# Patient Record
Sex: MALE | Race: WHITE | URBAN - METROPOLITAN AREA
[De-identification: names, ages, dates, MRNs, and addresses within clinical notes are randomized per-mention and may not be internally consistent; named-entity substitution may affect disease eponyms.]

---

## 2017-01-04 ENCOUNTER — TELEPHONE (OUTPATIENT)
Dept: CARDIOLOGY CLINIC | Age: 72
End: 2017-01-04

## 2017-01-04 NOTE — TELEPHONE ENCOUNTER
Dr. Wganer Asp office called and wanted to know if patient could hold coumadin prior to surgery (colonscopy) on 2/3/17. Please Advise.

## 2017-01-06 NOTE — TELEPHONE ENCOUNTER
Called Jaylyn the nurse at [facility] DR XIOMARA CHOI and let know that patient can hold coumadin prior to surgery and resume post procedure.

## 2017-01-12 ENCOUNTER — TELEPHONE ANTICOAG (OUTPATIENT)
Dept: CARDIOLOGY CLINIC | Age: 72
End: 2017-01-12

## 2017-01-12 DIAGNOSIS — I48.20 CHRONIC ATRIAL FIBRILLATION (HCC): ICD-10-CM

## 2017-01-12 LAB — INR, EXTERNAL: 3.2

## 2017-01-12 NOTE — PATIENT INSTRUCTIONS
Called and left VM with patient to continue with current dose of coumadin and recheck INR in 1 week on 1/12/17. If any questions to call office.

## 2017-01-26 ENCOUNTER — TELEPHONE ANTICOAG (OUTPATIENT)
Dept: CARDIOLOGY CLINIC | Age: 72
End: 2017-01-26

## 2017-01-26 DIAGNOSIS — I48.20 CHRONIC ATRIAL FIBRILLATION (HCC): ICD-10-CM

## 2017-01-26 LAB — INR, EXTERNAL: 3.1

## 2017-01-26 NOTE — PATIENT INSTRUCTIONS
Called and spoke patient to continue with current treatment, follow GI recommendations for resuming coumadin, ok to resume coumadin and recheck INR 3 days after resumed. He voices understanding and acceptance of this advice and will call back if any further questions or concerns.

## 2017-02-10 ENCOUNTER — TELEPHONE ANTICOAG (OUTPATIENT)
Dept: CARDIOLOGY CLINIC | Age: 72
End: 2017-02-10

## 2017-02-10 DIAGNOSIS — I48.20 CHRONIC ATRIAL FIBRILLATION (HCC): ICD-10-CM

## 2017-02-10 LAB — INR, EXTERNAL: 1.4

## 2017-02-10 NOTE — PATIENT INSTRUCTIONS
Patient called to review INR results. He will take coumadin 10 mg tonight and Saturday night then begin to alternate 7.5mg and 10mg every evening and frepeat INR on 2/14/17. He voices understanding and acceptance of this advice and will call back if any further questions or concerns.

## 2017-02-15 ENCOUNTER — TELEPHONE ANTICOAG (OUTPATIENT)
Dept: CARDIOLOGY CLINIC | Age: 72
End: 2017-02-15

## 2017-02-15 DIAGNOSIS — I48.20 CHRONIC ATRIAL FIBRILLATION (HCC): ICD-10-CM

## 2017-02-15 LAB — INR, EXTERNAL: 2.1

## 2017-02-15 NOTE — PATIENT INSTRUCTIONS
Called and left voicemail with patient to continue with current dose of coumadin and recheck INR in 1 week on 2/21/17. If any questions to call office.

## 2017-02-23 ENCOUNTER — TELEPHONE ANTICOAG (OUTPATIENT)
Dept: CARDIOLOGY CLINIC | Age: 72
End: 2017-02-23

## 2017-02-23 DIAGNOSIS — I48.20 CHRONIC ATRIAL FIBRILLATION (HCC): ICD-10-CM

## 2017-02-23 LAB — INR, EXTERNAL: 2.8

## 2017-03-01 ENCOUNTER — OFFICE VISIT (OUTPATIENT)
Dept: CARDIOLOGY CLINIC | Age: 72
End: 2017-03-01

## 2017-03-01 ENCOUNTER — ANTI-COAG VISIT (OUTPATIENT)
Dept: CARDIOLOGY CLINIC | Age: 72
End: 2017-03-01

## 2017-03-01 VITALS
HEART RATE: 87 BPM | BODY MASS INDEX: 21.54 KG/M2 | WEIGHT: 159 LBS | DIASTOLIC BLOOD PRESSURE: 78 MMHG | SYSTOLIC BLOOD PRESSURE: 108 MMHG | HEIGHT: 72 IN

## 2017-03-01 DIAGNOSIS — I34.0 MODERATE MITRAL REGURGITATION: Primary | ICD-10-CM

## 2017-03-01 DIAGNOSIS — I11.9 BENIGN HYPERTENSIVE HEART DISEASE WITHOUT HEART FAILURE: ICD-10-CM

## 2017-03-01 DIAGNOSIS — E11.9 TYPE 2 DIABETES MELLITUS WITHOUT COMPLICATION, UNSPECIFIED LONG TERM INSULIN USE STATUS: ICD-10-CM

## 2017-03-01 DIAGNOSIS — I48.20 CHRONIC ATRIAL FIBRILLATION (HCC): ICD-10-CM

## 2017-03-01 DIAGNOSIS — I48.92 PAROXYSMAL ATRIAL FLUTTER (HCC): ICD-10-CM

## 2017-03-01 DIAGNOSIS — E78.5 DYSLIPIDEMIA: ICD-10-CM

## 2017-03-01 LAB
INR BLD: 2.2
PT POC: NORMAL SEC
VALID INTERNAL CONTROL?: YES

## 2017-03-01 RX ORDER — IPRATROPIUM BROMIDE 42 UG/1
2 SPRAY, METERED NASAL 4 TIMES DAILY
COMMUNITY
End: 2018-06-21

## 2017-03-01 NOTE — MR AVS SNAPSHOT
Visit Information Date & Time Provider Department Dept. Phone Encounter #  
 3/1/2017 11:30 AM Robert Flores MD [facility] [phone] [id number] Follow-up Instructions Return in about 3 months (around 6/1/2017). Follow-up and Disposition History Your Appointments 5/30/2017 11:30 AM  
PROCEDURE with [place] ECHO [facility] ([facility]) Appt Note: Echo/Phill[address] [facility] [address]  
  
   
 [address] [id number] [address] [address] 6/14/2017 10:30 AM  
Follow Up with Robert Flores MD  
[facility] ([facility]) Appt Note: 3 month follow up post echo  
 [address] [facility] [address]  
  
   
 [address] [facility] [address]  
  
    
  
 3/21/2017  2:15 PM  
Any with Scott Lee MD  
[facility] ([facility]) [address] [place] [address]  
[address] [facility] [address] [facility] [address] [facility] [address] Upcoming Health Maintenance Date Due Hepatitis C Screening 1945 FOOT EXAM Q1 7/1/1957 EYE EXAM RETINAL OR DILATED Q1 7/1/1957 DTaP/Tdap/Td series (1 - Tdap) 7/1/1968 FOBT Q 1 YEAR AGE 50-75 7/1/1997 ZOSTER VACCINE AGE 60> 7/1/2007 GLAUCOMA SCREENING Q2Y 7/1/2012 Pneumococcal 65+ Low/Medium Risk (1 of 2 - PCV13) 7/1/2012 MEDICARE YEARLY EXAM 7/1/2012 HEMOGLOBIN A1C Q6M 5/18/2016 INFLUENZA AGE 9 TO ADULT 8/1/2016 MICROALBUMIN Q1 11/18/2016 LIPID PANEL Q1 11/18/2016 Allergies as of 3/1/2017  Review Complete On: 3/1/2017 By: Robert Flores MD  
  
 Severity Noted Reaction Type Reactions Iodinated Contrast Media - Oral And Iv Dye    Unknown (comments) States no allergy to iodine Other Medication    Unknown (comments) Anesthesia (nausea) Current Immunizations  Never Reviewed No immunizations on file. Not reviewed this visit You Were Diagnosed With   
  
 Codes Comments Moderate mitral regurgitation    -  Primary ICD-10-CM: I34.0 ICD-9-CM: 424.0 Paroxysmal atrial flutter (HCC)     ICD-10-CM: I48.92 
ICD-9-CM: 427.32 Dyslipidemia     ICD-10-CM: E78.5 ICD-9-CM: 272.4 Benign hypertensive heart disease without heart failure     ICD-10-CM: I11.9 ICD-9-CM: 402.10 Type 2 diabetes mellitus without complication, unspecified long term insulin use status (HCC)     ICD-10-CM: E11.9 ICD-9-CM: 250.00 Vitals BP  
  
  
  
  
  
 108/78 Vitals History BMI and BSA Data Body Mass Index Body Surface Area  
 21.56 kg/m 2 1.91 m 2 Preferred Pharmacy Pharmacy Name Phone  
 [address] [place] [address] [phone] Your Updated Medication List  
  
   
This list is accurate as of: 3/1/17  3:02 PM.  Always use your most recent med list.  
  
  
  
  
 cholecalciferol (VITAMIN D3) 5,000 unit Tab tablet Commonly known as:  VITAMIN D3 Take 2,000 Units by mouth. DIGOX 0.125 mg tablet Generic drug:  digoxin TAKE 1 TABLET ONCE DAILY. dutasteride 0.5 mg capsule Commonly known as:  AVODART Take 1 Cap by mouth two (2) days a week. HumaLOG Mix 50-50 100 unit/mL (50-50) Inpn Generic drug:  lispro-protamine & lispro  
by SubCUTAneous route two (2) times a day. 22 units Q AM, 10 units QPM  
  
 ipratropium 0.06 % nasal spray Commonly known as:  ATROVENT  
2 Sprays four (4) times daily. levothyroxine 125 mcg tablet Commonly known as:  SYNTHROID Take 137 mcg by mouth Daily (before breakfast). lisinopril 5 mg tablet Commonly known as:  PRINIVIL, ZESTRIL  
1 tablet by mouth daily, Hold medication if systolic less than 701 or diastolic less than 60  
  
 metoprolol tartrate 50 mg tablet Commonly known as:  LOPRESSOR Take 1 Tab by mouth two (2) times a day. warfarin 5 mg tablet Commonly known as:  COUMADIN Take 1 1/2 tablets every evening or as directed. Follow-up Instructions Return in about 3 months (around 6/1/2017). To-Do List   
 06/01/2017 Cardiac Services:  2D ECHO COMPLETE ADULT (TTE) Patient Instructions Mitral Valve Regurgitation: Care Instructions Your Care Instructions The mitral valve lets blood flow from the upper to lower areas of the heart. Mitral valve regurgitation occurs when the valve cannot close all the way and blood backs up (regurgitates) into the upper area of the heart. This causes the heart to work harder to pump the extra blood. Mild regurgitation causes few problems. Many people have it for many years without having problems. But if the regurgitation is severe, it can weaken the heart and lead to heart failure. The causes of mitral valve regurgitation include a heart attack, heart infection (endocarditis), mitral valve prolapse, cardiomyopathy, calcium buildup in the heart, the weight-loss medicine Fen-Phen, and diseases such as lupus and rheumatoid arthritis. Some people are born with the valve problem. Your doctor may just want to watch your health closely if you have mild mitral valve regurgitation. For more severe disease, you may need medicine or surgery to fix the valve. Follow-up care is a key part of your treatment and safety. Be sure to make and go to all appointments, and call your doctor if you are having problems. It's also a good idea to know your test results and keep a list of the medicines you take. How can you care for yourself at home? · Be safe with medicines. Take your medicines exactly as prescribed. Call your doctor if you think you are having a problem with your medicine. You will get more details on the specific medicines your doctor prescribes.  
· Eat heart-healthy foods such as fruits, vegetables, whole grains, fish, lean meats, and low-fat or nonfat dairy foods. Limit sodium, sugar, and alcohol. · Be active. Ask your doctor what type and level of exercise is safe for you. Walking is a good choice. You also may want to swim, bike, or do other activities. Let your doctor know if your ability to exercise changes. · Do not smoke. If you need help quitting, talk to your doctor about stop-smoking programs and medicines. These can increase your chances of quitting for good. · Stay at a healthy weight. Lose weight if you need to. · Avoid colds and flu. Get a pneumococcal vaccine shot. If you have had one before, ask your doctor if you need another dose. Get a flu vaccine every year. · Take care of your teeth and gums. Get regular dental checkups. Good dental health is important because bacteria can spread from infected teeth and gums to the heart valves. When should you call for help? Call 911 anytime you think you may need emergency care. For example, call if: 
· You have severe trouble breathing. · You cough up pink, foamy mucus. · You have symptoms of a heart attack. These may include: ¨ Chest pain or pressure, or a strange feeling in the chest. 
¨ Sweating. ¨ Shortness of breath. ¨ Nausea or vomiting. ¨ Pain, pressure, or a strange feeling in the back, neck, jaw, or upper belly or in one or both shoulders or arms. ¨ Lightheadedness or sudden weakness. ¨ A fast or irregular heartbeat. After you call 911, the  may tell you to chew 1 adult-strength or 2 to 4 low-dose aspirin. Wait for an ambulance. Do not try to drive yourself. Call your doctor now or seek immediate medical care if: 
· You have new or increased shortness of breath. · You are dizzy or lightheaded, or you feel like you may faint. · You have sudden weight gain, such as 3 pounds or more in 2 to 3 days. · You have increased swelling in your legs, ankles, or feet. · You are suddenly so tired or weak that you cannot do your usual activities. Watch closely for changes in your health, and be sure to contact your doctor if you develop new symptoms. Where can you learn more? Go to http://rayray-bonifacio.info/. Enter G003 in the search box to learn more about \"Mitral Valve Regurgitation: Care Instructions. \" Current as of: January 27, 2016 Content Version: 11.1 © 8914-9535 [employer]. Care instructions adapted under license by [employer] (which disclaims liability or warranty for this information). If you have questions about a medical condition or this instruction, always ask your healthcare professional. [place] [address] any warranty or liability for your use of this information. Patient Instructions History Introducing [facility]! [address] introduces Vertra patient portal. Now you can access parts of your medical record, email your doctor's office, and request medication refills online. 1. In your internet browser, go to [email or web address] 2. Click on the First Time User? Click Here link in the Sign In box. You will see the New Member Sign Up page. 3. Enter your Vertra Access Code exactly as it appears below. You will not need to use this code after youve completed the sign-up process. If you do not sign up before the expiration date, you must request a new code. · Vertra Access Code: [email or web address] Expires: 5/16/2017  1:05 PM 
 
4. Enter the last four digits of your Social Security Number (xxxx) and Date of Birth (mm/dd/yyyy) as indicated and click Submit. You will be taken to the next sign-up page. 5. Create a Vertra ID. This will be your Vertra login ID and cannot be changed, so think of one that is secure and easy to remember. 6. Create a Vertra password. You can change your password at any time. 7. Enter your Password Reset Question and Answer. This can be used at a later time if you forget your password. 8. Enter your e-mail address. You will receive e-mail notification when new information is available in [address] 9. Click Sign Up. You can now view and download portions of your medical record. 10. Click the Download Summary menu link to download a portable copy of your medical information. If you have questions, please visit the Frequently Asked Questions section of the [employer] website. Remember, [employer] is NOT to be used for urgent needs. For medical emergencies, dial 911. Now available from your iPhone and Android! Please provide this summary of care documentation to your next provider. Your primary care clinician is listed as Jamie Hough. If you have any questions after today's visit, please call [phone].

## 2017-03-01 NOTE — PATIENT INSTRUCTIONS
Mitral Valve Regurgitation: Care Instructions  Your Care Instructions    The mitral valve lets blood flow from the upper to lower areas of the heart. Mitral valve regurgitation occurs when the valve cannot close all the way and blood backs up (regurgitates) into the upper area of the heart. This causes the heart to work harder to pump the extra blood. Mild regurgitation causes few problems. Many people have it for many years without having problems. But if the regurgitation is severe, it can weaken the heart and lead to heart failure. The causes of mitral valve regurgitation include a heart attack, heart infection (endocarditis), mitral valve prolapse, cardiomyopathy, calcium buildup in the heart, the weight-loss medicine Fen-Phen, and diseases such as lupus and rheumatoid arthritis. Some people are born with the valve problem. Your doctor may just want to watch your health closely if you have mild mitral valve regurgitation. For more severe disease, you may need medicine or surgery to fix the valve. Follow-up care is a key part of your treatment and safety. Be sure to make and go to all appointments, and call your doctor if you are having problems. It's also a good idea to know your test results and keep a list of the medicines you take. How can you care for yourself at home? · Be safe with medicines. Take your medicines exactly as prescribed. Call your doctor if you think you are having a problem with your medicine. You will get more details on the specific medicines your doctor prescribes. · Eat heart-healthy foods such as fruits, vegetables, whole grains, fish, lean meats, and low-fat or nonfat dairy foods. Limit sodium, sugar, and alcohol. · Be active. Ask your doctor what type and level of exercise is safe for you. Walking is a good choice. You also may want to swim, bike, or do other activities. Let your doctor know if your ability to exercise changes. · Do not smoke.  If you need help quitting, talk to your doctor about stop-smoking programs and medicines. These can increase your chances of quitting for good. · Stay at a healthy weight. Lose weight if you need to. · Avoid colds and flu. Get a pneumococcal vaccine shot. If you have had one before, ask your doctor if you need another dose. Get a flu vaccine every year. · Take care of your teeth and gums. Get regular dental checkups. Good dental health is important because bacteria can spread from infected teeth and gums to the heart valves. When should you call for help? Call 911 anytime you think you may need emergency care. For example, call if:  · You have severe trouble breathing. · You cough up pink, foamy mucus. · You have symptoms of a heart attack. These may include:  ¨ Chest pain or pressure, or a strange feeling in the chest.  ¨ Sweating. ¨ Shortness of breath. ¨ Nausea or vomiting. ¨ Pain, pressure, or a strange feeling in the back, neck, jaw, or upper belly or in one or both shoulders or arms. ¨ Lightheadedness or sudden weakness. ¨ A fast or irregular heartbeat. After you call 911, the  may tell you to chew 1 adult-strength or 2 to 4 low-dose aspirin. Wait for an ambulance. Do not try to drive yourself. Call your doctor now or seek immediate medical care if:  · You have new or increased shortness of breath. · You are dizzy or lightheaded, or you feel like you may faint. · You have sudden weight gain, such as 3 pounds or more in 2 to 3 days. · You have increased swelling in your legs, ankles, or feet. · You are suddenly so tired or weak that you cannot do your usual activities. Watch closely for changes in your health, and be sure to contact your doctor if you develop new symptoms. Where can you learn more? Go to http://rayray-bonifacio.info/. Enter G003 in the search box to learn more about \"Mitral Valve Regurgitation: Care Instructions. \"  Current as of: January 27, 2016  Content Version: 11.1  © 7395-8054 [employer]. Care instructions adapted under license by [employer] (which disclaims liability or warranty for this information). If you have questions about a medical condition or this instruction, always ask your healthcare professional. [place] [address] any warranty or liability for your use of this information.

## 2017-03-01 NOTE — PROGRESS NOTES
1. Have you been to the ER, urgent care clinic since your last visit? Hospitalized since your last visit? No    2. Have you seen or consulted any other health care providers outside of the [address] [place] [address] since your last visit? Include any pap smears or colon screening. Yes Where: Dr [place] Tamez     3. Since your last visit, have you had any of the following symptoms? shortness of breath. 4.  Have you had any blood work, X-rays or cardiac testing? No              5.  Where do you normally have your labs drawn? Dr Bob Reyes     6. Do you need any refills today?    No

## 2017-03-01 NOTE — PROGRESS NOTES
HISTORY OF PRESENT ILLNESS  Josie Alexandra is a 73 y.o. male. Shortness of Breath   The history is provided by the patient. This is a chronic problem. The problem occurs intermittently. The current episode started more than 1 week ago. The problem has not changed since onset. Pertinent negatives include no fever, no ear pain, no neck pain, no cough, no sputum production, no hemoptysis, no wheezing, no PND, no orthopnea, no syncope, no vomiting, no rash, no leg pain, no leg swelling and no claudication. He has had prior hospitalizations. Associated medical issues do not include chronic lung disease, CAD or heart failure. Palpitations    The history is provided by the patient. The current episode started more than 1 week ago. The problem has been rapidly improving. The problem occurs rarely. Pertinent negatives include no diaphoresis, no fever, no claudication, no near-syncope, no orthopnea, no PND, no syncope, no nausea, no vomiting, no leg pain, no dizziness, no weakness, no cough, no hemoptysis and no sputum production. Risk factors include dyslipidemia, hypertension and male gender. His past medical history is significant for hypertension and atrial fibrillation. His past medical history does not include hyperthyroidism. Hypertension   The history is provided by the patient. This is a chronic problem. The current episode started more than 1 week ago. The problem occurs every several days. The problem has not changed since onset. Review of Systems   Constitutional: Negative for chills, diaphoresis, fever and weight loss. HENT: Negative for ear pain and hearing loss. Eyes: Negative for blurred vision. Respiratory: Negative for cough, hemoptysis, sputum production, wheezing and stridor. Cardiovascular: Positive for palpitations. Negative for orthopnea, claudication, leg swelling, syncope, PND and near-syncope. Gastrointestinal: Negative for heartburn, nausea and vomiting.    Musculoskeletal: Negative for myalgias and neck pain. Skin: Negative for rash. Neurological: Negative for dizziness, tingling, tremors, focal weakness, loss of consciousness and weakness. Psychiatric/Behavioral: Negative for depression and suicidal ideas. Family History   Problem Relation Age of Onset    Hypertension Mother     Heart Attack Father     Pacemaker Father        Past Medical History:   Diagnosis Date    AF (atrial fibrillation) ([facility] [address] 9/3/09    Atrial flutter (HCC)     BPH (benign prostatic hypertrophy) 9/3/09    Carotid duplex 09/14/05    No stenosis bilaterally     Diabetes mellitus, type 2 ([facility] [address]     Echocardiogram 10/27/2011    EF 60-65%. Gr 1 DDfx. RVSP 30 mmHg. Mild LAE. Mild MR. Diastolic doming of mitral valve. Mild IVCE.     Elevated PSA     Hypercholesterolemia     dislipidemia    Hypertension     Hypothyroidism     on replacement therapy    Long term (current) use of anticoagulants 3/16/2011    Lower extremity arterial duplex 03/16/09    No evidence of arterial insufficiency bilaterally at rest    Paroxysmal atrial fibrillation (HCC)     Personal history of urinary calculi 9/3/09    Stress thallium 08/07/08    Very small scarring in basal inferior wall w/min ischemia likely artifact; EF 68%; rapid AF during exercise; EKG portion negative which was terminated prematurely because of the persistent rapid AF       Past Surgical History:   Procedure Laterality Date    ABDOMEN SURGERY PROC UNLISTED  1991    Colon surgical repair following puncture during removal of polyps    HX HERNIA REPAIR      1974, left; 1997, right    HX LITHOTRIPSY  10/2004    Removed kidney stones    HX OTHER SURGICAL  7/2015    Scar tissue removed from bowels    HX TONSILLECTOMY      age 12       Social History   Substance Use Topics    Smoking status: Former Smoker     Packs/day: 1.50     Years: 35.00     Quit date: 10/11/1980    Smokeless tobacco: Never Used    Alcohol use No       Allergies Allergen Reactions    Iodinated Contrast Media - Oral And Iv Dye Unknown (comments)     States no allergy to iodine    Other Medication Unknown (comments)     Anesthesia (nausea)       Outpatient Prescriptions Marked as Taking for the 3/1/17 encounter (Office Visit) with Abner León MD   Medication Sig Dispense Refill    ipratropium (ATROVENT) 0.06 % nasal spray 2 Sprays four (4) times daily.  dutasteride (AVODART) 0.5 mg capsule Take 1 Cap by mouth two (2) days a week. 30 Cap 2    DIGOX 125 mcg tablet TAKE 1 TABLET ONCE DAILY. 90 Tab 0    metoprolol (LOPRESSOR) 50 mg tablet Take 1 Tab by mouth two (2) times a day. 180 Tab 3    warfarin (COUMADIN) 5 mg tablet Take 1 1/2 tablets every evening or as directed. 180 Tab 3    lisinopril (PRINIVIL, ZESTRIL) 5 mg tablet 1 tablet by mouth daily, Hold medication if systolic less than 009 or diastolic less than 60 90 Tab 3    levothyroxine (SYNTHROID) 125 mcg tablet Take 137 mcg by mouth Daily (before breakfast).  Cholecalciferol, Vitamin D3, 5,000 unit Tab Take 2,000 Units by mouth.  lispro-protamine & lispro (HUMALOG MIX 50-50) 100 unit/mL (50-50) flexpen by SubCUTAneous route two (2) times a day. 22 units Q AM, 10 units QPM           Visit Vitals    /78    Pulse 87    Ht 6' (1.829 m)    Wt 72.1 kg (159 lb)    BMI 21.56 kg/m2     Physical Exam   Constitutional: He is oriented to person, place, and time. He appears well-developed and well-nourished. No distress. HENT:   Head: Atraumatic. Mouth/Throat: No oropharyngeal exudate. Eyes: Conjunctivae are normal. No scleral icterus. Neck: Neck supple. No JVD present. Cardiovascular: Normal rate and regular rhythm. Exam reveals no gallop. Murmur (3/6 holosystolic murmur best heard at mitral area with no radiation) heard. Pulmonary/Chest: Effort normal and breath sounds normal. No stridor. He has no wheezes. He has no rales. Abdominal: Soft. There is no tenderness.  There is no rebound. Musculoskeletal: Normal range of motion. He exhibits no edema or tenderness. Neurological: He is alert and oriented to person, place, and time. He exhibits normal muscle tone. Skin: Skin is warm. He is not diaphoretic. Psychiatric: He has a normal mood and affect. His behavior is normal.       ASSESSMENT and PLAN    ICD-10-CM ICD-9-CM    1. Moderate mitral regurgitation I34.0 424.0 2D ECHO COMPLETE ADULT (TTE)   2. Paroxysmal atrial flutter (HCC) I48.92 427.32    3. Dyslipidemia E78.5 272.4    4. Hypertension I11.9 402.10    5. Type 2 diabetes mellitus without complication, unspecified long term insulin use status (HCC) E11.9 250.00      Follow-up Disposition:  Return in about 3 months (around 6/1/2017). Continue coumadin for anticoagulation. Patient on lopressor and digoxin for rate control. Has very minimal breakthrough palpitations which lasts for few seconds. Will decrease lopressor to 25mg bid as his BP is low normal and feels weak at times  Will obtain echo to assess VHD and follow up in 3months.

## 2017-03-01 NOTE — PATIENT INSTRUCTIONS
Patient will continue to alternate coumadin 7.5mg and 10mg every evening and repeat INR in two weeks, He voices understanding and acceptance of this advice and will call back if any further questions or concerns.

## 2017-03-16 ENCOUNTER — TELEPHONE ANTICOAG (OUTPATIENT)
Dept: CARDIOLOGY CLINIC | Age: 72
End: 2017-03-16

## 2017-03-16 DIAGNOSIS — I48.20 CHRONIC ATRIAL FIBRILLATION (HCC): ICD-10-CM

## 2017-03-16 LAB — INR, EXTERNAL: 2.2

## 2017-03-16 NOTE — PATIENT INSTRUCTIONS
Spoke with patient to continue current dose of coumadin and recheck INR in 2 weeks on 3/30/17. He voices understanding and acceptance of this advice and will call back if any further questions or concerns.

## 2017-03-23 RX ORDER — METOPROLOL TARTRATE 50 MG/1
TABLET ORAL
Qty: 180 TAB | Refills: 0 | Status: SHIPPED | OUTPATIENT
Start: 2017-03-23 | End: 2017-12-06 | Stop reason: SDUPTHER

## 2017-03-27 DIAGNOSIS — I48.92 ATRIAL FLUTTER, UNSPECIFIED TYPE (HCC): Primary | ICD-10-CM

## 2017-03-27 NOTE — TELEPHONE ENCOUNTER
----- Message from Anuradha Guthrie sent at 3/27/2017  9:46 AM EDT -----  Regarding: REFILL  DIGOX 125 mcg tablet, [facility], 90 DAY SUPPLY REQUESTED BY PATIENT

## 2017-03-28 RX ORDER — DIGOXIN 125 MCG
0.12 TABLET ORAL DAILY
Qty: 90 TAB | Refills: 3 | Status: SHIPPED | OUTPATIENT
Start: 2017-03-28 | End: 2018-03-19 | Stop reason: SDUPTHER

## 2017-03-30 ENCOUNTER — TELEPHONE ANTICOAG (OUTPATIENT)
Dept: CARDIOLOGY CLINIC | Age: 72
End: 2017-03-30

## 2017-03-30 DIAGNOSIS — I48.91 ATRIAL FIBRILLATION, UNSPECIFIED TYPE (HCC): ICD-10-CM

## 2017-03-30 LAB — INR, EXTERNAL: 2.3

## 2017-03-30 NOTE — PATIENT INSTRUCTIONS
Called and left voice mail for patient to continue current dose of coumadin and recheck INR in 2 weeks on 4/13/17. If any questions to call the office.

## 2017-04-14 ENCOUNTER — TELEPHONE ANTICOAG (OUTPATIENT)
Dept: CARDIOLOGY CLINIC | Age: 72
End: 2017-04-14

## 2017-04-14 LAB — INR, EXTERNAL: 2.7

## 2017-05-02 ENCOUNTER — TELEPHONE (OUTPATIENT)
Dept: CARDIOLOGY CLINIC | Age: 72
End: 2017-05-02

## 2017-05-02 NOTE — TELEPHONE ENCOUNTER
Patient had home INR testing meter. Most contracts state that you have to test weekly. Some are for every two weeks, Dr. Nya Botello wrote order for every two weeks and company has accepted this order. Patient now calling and stated that Dr. Nya Botello has informed him that he can test every three weeks. With this new order, I wonder if the company and insurance will feel that the patient does not need home monitoring. Please advise.

## 2017-05-02 NOTE — TELEPHONE ENCOUNTER
----- Message from Fabiola Pereira LPN sent at 7/5/8575 10:57 AM EDT -----  Regarding: FW: NEED NEW INR ORDER   Contact: [phone]      ----- Message -----     From: Pro Ellitot     Sent: 5/1/2017   9:45 AM       To: Fabiola Pereira LPN  Subject: NEED NEW INR ORDER                               PLEASE RETURN PATIENT CALL CONCERNING INR ORDER; NEED NEW ORDER THAT STATES EVERY 3 WEEK INSTEAD OF THE 2WEEK.

## 2017-05-02 NOTE — TELEPHONE ENCOUNTER
Patient called and message left with son regarding testing every 2 weeks, for INR home meter. Return call requested for any questions or concerns.

## 2017-05-03 ENCOUNTER — TELEPHONE ANTICOAG (OUTPATIENT)
Dept: CARDIOLOGY CLINIC | Age: 72
End: 2017-05-03

## 2017-05-03 DIAGNOSIS — I48.20 CHRONIC ATRIAL FIBRILLATION (HCC): ICD-10-CM

## 2017-05-03 LAB — INR, EXTERNAL: 2.6

## 2017-05-03 NOTE — PATIENT INSTRUCTIONS
Patient called to review INR results. He will continue alternating coumadin 7.5mg and 10mg and  Repeat INR in 2 weeks. He voices understanding and acceptance of this advice and will call back if any further questions or concerns.

## 2017-05-16 ENCOUNTER — TELEPHONE ANTICOAG (OUTPATIENT)
Dept: CARDIOLOGY CLINIC | Age: 72
End: 2017-05-16

## 2017-05-16 DIAGNOSIS — I48.91 ATRIAL FIBRILLATION, UNSPECIFIED TYPE (HCC): ICD-10-CM

## 2017-05-16 LAB — INR, EXTERNAL: 2.2

## 2017-05-16 NOTE — PATIENT INSTRUCTIONS
Called patient instructed patient to continue current dose of coumadin. Recheck INR in 2 weeks on 5/30/17. He voices understanding and acceptance of this advice and will call back if any further questions or concerns.

## 2017-05-30 ENCOUNTER — TELEPHONE ANTICOAG (OUTPATIENT)
Dept: CARDIOLOGY CLINIC | Age: 72
End: 2017-05-30

## 2017-05-30 ENCOUNTER — CLINICAL SUPPORT (OUTPATIENT)
Dept: CARDIOLOGY CLINIC | Age: 72
End: 2017-05-30

## 2017-05-30 DIAGNOSIS — I34.0 MODERATE MITRAL REGURGITATION: ICD-10-CM

## 2017-05-30 DIAGNOSIS — I48.20 CHRONIC ATRIAL FIBRILLATION (HCC): ICD-10-CM

## 2017-05-30 LAB — INR, EXTERNAL: 2.3

## 2017-05-30 NOTE — PATIENT INSTRUCTIONS
Called patient instructed patient to continue current dose of coumadin. Recheck INR in 2 weeks on June 13,2017. He voices understanding and acceptance of this advice and will call back if any further questions or concerns.

## 2017-06-13 ENCOUNTER — TELEPHONE ANTICOAG (OUTPATIENT)
Dept: CARDIOLOGY CLINIC | Age: 72
End: 2017-06-13

## 2017-06-13 DIAGNOSIS — I48.91 ATRIAL FIBRILLATION, UNSPECIFIED TYPE (HCC): ICD-10-CM

## 2017-06-13 LAB — INR, EXTERNAL: 2.2

## 2017-06-13 NOTE — PATIENT INSTRUCTIONS
Called patient instructed patient to continue current dose of coumadin. Recheck INR in 2 weeks on 6/27/17. He voices understanding and acceptance of this advice and will call back if any further questions or concerns.

## 2017-06-14 ENCOUNTER — OFFICE VISIT (OUTPATIENT)
Dept: CARDIOLOGY CLINIC | Age: 72
End: 2017-06-14

## 2017-06-14 VITALS
WEIGHT: 159 LBS | DIASTOLIC BLOOD PRESSURE: 71 MMHG | HEART RATE: 90 BPM | HEIGHT: 72 IN | SYSTOLIC BLOOD PRESSURE: 101 MMHG | BODY MASS INDEX: 21.54 KG/M2

## 2017-06-14 DIAGNOSIS — E11.9 TYPE 2 DIABETES MELLITUS WITHOUT COMPLICATION, UNSPECIFIED LONG TERM INSULIN USE STATUS: ICD-10-CM

## 2017-06-14 DIAGNOSIS — I11.9 BENIGN HYPERTENSIVE HEART DISEASE WITHOUT HEART FAILURE: ICD-10-CM

## 2017-06-14 DIAGNOSIS — I34.0 MODERATE MITRAL REGURGITATION: ICD-10-CM

## 2017-06-14 DIAGNOSIS — I48.92 PAROXYSMAL ATRIAL FLUTTER (HCC): Primary | ICD-10-CM

## 2017-06-14 DIAGNOSIS — E78.5 DYSLIPIDEMIA: ICD-10-CM

## 2017-06-14 RX ORDER — WARFARIN 10 MG/1
10 TABLET ORAL DAILY
COMMUNITY
End: 2017-12-28 | Stop reason: SDUPTHER

## 2017-06-14 NOTE — PROGRESS NOTES
1. Have you been to the ER, urgent care clinic since your last visit? Hospitalized since your last visit? No    2. Have you seen or consulted any other health care providers outside of the [address] [place] [address] since your last visit? Include any pap smears or colon screening. No     3. Since your last visit, have you had any of the following symptoms? .          4. Have you had any blood work, X-rays or cardiac testing? No              5.  Where do you normally have your labs drawn? [facility]    6. Do you need any refills today?    No

## 2017-06-14 NOTE — PATIENT INSTRUCTIONS

## 2017-06-14 NOTE — MR AVS SNAPSHOT
Visit Information Date & Time Provider Department Dept. Phone Encounter #  
 6/14/2017 10:30 AM Lisa Cruz MD [facility] [id number] Follow-up Instructions Return in about 6 months (around 12/14/2017). Follow-up and Disposition History Your Appointments 3/27/2018 10:30 AM  
Nurse Visit with [facility] ([facility]) Appt Note: PSA  
 [address] 
[address] [facility] [address]  
[address] [facility] [address] [facility] [address]  
  
    
  
 4/3/2018  1:00 PM  
Any with Mireille Lew MD  
[facility] ([facility]) Appt Note: 1 yr fu  
 [address] 
[address] [facility] [address]  
[address] [facility] [address] [facility] [address] [facility] [address] Upcoming Health Maintenance Date Due Hepatitis C Screening 1945 FOOT EXAM Q1 7/1/1957 EYE EXAM RETINAL OR DILATED Q1 7/1/1957 DTaP/Tdap/Td series (1 - Tdap) 7/1/1968 FOBT Q 1 YEAR AGE 50-75 7/1/1997 ZOSTER VACCINE AGE 60> 7/1/2007 GLAUCOMA SCREENING Q2Y 7/1/2012 Pneumococcal 65+ Low/Medium Risk (1 of 2 - PCV13) 7/1/2012 MEDICARE YEARLY EXAM 7/1/2012 HEMOGLOBIN A1C Q6M 5/18/2016 MICROALBUMIN Q1 11/18/2016 LIPID PANEL Q1 11/18/2016 INFLUENZA AGE 9 TO ADULT 8/1/2017 Allergies as of 6/14/2017  Review Complete On: 6/14/2017 By: Lisa Cruz MD  
  
 Severity Noted Reaction Type Reactions Iodinated Contrast Media - Oral And Iv Dye    Unknown (comments) States no allergy to iodine Other Medication    Unknown (comments) Anesthesia (nausea) Current Immunizations  Never Reviewed No immunizations on file. Not reviewed this visit You Were Diagnosed With   
  
 Codes Comments  Paroxysmal atrial flutter (HCC)    -  Primary ICD-10-CM: I48.92 
ICD-9-CM: 427.32   
 Benign hypertensive heart disease without heart failure     ICD-10-CM: I11.9 ICD-9-CM: 402.10 Dyslipidemia     ICD-10-CM: E78.5 ICD-9-CM: 272.4 Moderate mitral regurgitation     ICD-10-CM: I34.0 ICD-9-CM: 424.0 Type 2 diabetes mellitus without complication, unspecified long term insulin use status     ICD-10-CM: E11.9 ICD-9-CM: 250.00 Vitals BP Pulse Height(growth percentile) Weight(growth percentile) BMI Smoking Status 101/71 90 6' (1.829 m) 159 lb (72.1 kg) 21.56 kg/m2 Former Smoker Vitals History BMI and BSA Data Body Mass Index Body Surface Area  
 21.56 kg/m 2 1.91 m 2 Preferred Pharmacy Pharmacy Name Phone  
 [address] [place] [address] [phone] Your Updated Medication List  
  
   
This list is accurate as of: 6/14/17 10:51 AM.  Always use your most recent med list.  
  
  
  
  
 cholecalciferol (VITAMIN D3) 5,000 unit Tab tablet Commonly known as:  VITAMIN D3 Take 2,000 Units by mouth. digoxin 0.125 mg tablet Commonly known as:  [address] [address] [facility] Take 1 Tab by mouth daily. dutasteride 0.5 mg capsule Commonly known as:  AVODART Take 1 Cap by mouth two (2) days a week. HumaLOG Mix 50-50 100 unit/mL (50-50) Inpn Generic drug:  lispro-protamine & lispro  
by SubCUTAneous route two (2) times a day. 22 units Q AM, 10 units QPM  
  
 ipratropium 0.06 % nasal spray Commonly known as:  ATROVENT  
2 Sprays four (4) times daily. levothyroxine 125 mcg tablet Commonly known as:  SYNTHROID Take 125 mcg by mouth Daily (before breakfast). lisinopril 5 mg tablet Commonly known as:  PRINIVIL, ZESTRIL  
1 tablet by mouth daily, Hold medication if systolic less than 187 or diastolic less than 60  
  
 metoprolol tartrate 50 mg tablet Commonly known as:  LOPRESSOR  
TAKE (1) TABLET TWICE A DAY  
  
 warfarin 10 mg tablet Commonly known as:  COUMADIN  
 Take 10 mg by mouth daily. Take 1 tablet every other day then 7.5 mg We Performed the Following AMB POC EKG ROUTINE W/ 12 LEADS, INTER & REP [22397 CPT(R)] Follow-up Instructions Return in about 6 months (around 12/14/2017). Patient Instructions High Blood Pressure: Care Instructions Your Care Instructions If your blood pressure is usually above 140/90, you have high blood pressure, or hypertension. That means the top number is 140 or higher or the bottom number is 90 or higher, or both. Despite what a lot of people think, high blood pressure usually doesn't cause headaches or make you feel dizzy or lightheaded. It usually has no symptoms. But it does increase your risk for heart attack, stroke, and kidney or eye damage. The higher your blood pressure, the more your risk increases. Your doctor will give you a goal for your blood pressure. Your goal will be based on your health and your age. An example of a goal is to keep your blood pressure below 140/90. Lifestyle changes, such as eating healthy and being active, are always important to help lower blood pressure. You might also take medicine to reach your blood pressure goal. 
Follow-up care is a key part of your treatment and safety. Be sure to make and go to all appointments, and call your doctor if you are having problems. It's also a good idea to know your test results and keep a list of the medicines you take. How can you care for yourself at home? Medical treatment · If you stop taking your medicine, your blood pressure will go back up. You may take one or more types of medicine to lower your blood pressure. Be safe with medicines. Take your medicine exactly as prescribed. Call your doctor if you think you are having a problem with your medicine. · Talk to your doctor before you start taking aspirin every day. Aspirin can help certain people lower their risk of a heart attack or stroke.  But taking aspirin isn't right for everyone, because it can cause serious bleeding. · See your doctor regularly. You may need to see the doctor more often at first or until your blood pressure comes down. · If you are taking blood pressure medicine, talk to your doctor before you take decongestants or anti-inflammatory medicine, such as ibuprofen. Some of these medicines can raise blood pressure. · Learn how to check your blood pressure at home. Lifestyle changes · Stay at a healthy weight. This is especially important if you put on weight around the waist. Losing even 10 pounds can help you lower your blood pressure. · If your doctor recommends it, get more exercise. Walking is a good choice. Bit by bit, increase the amount you walk every day. Try for at least 30 minutes on most days of the week. You also may want to swim, bike, or do other activities. · Avoid or limit alcohol. Talk to your doctor about whether you can drink any alcohol. · Try to limit how much sodium you eat to less than 2,300 milligrams (mg) a day. Your doctor may ask you to try to eat less than 1,500 mg a day. · Eat plenty of fruits (such as bananas and oranges), vegetables, legumes, whole grains, and low-fat dairy products. · Lower the amount of saturated fat in your diet. Saturated fat is found in animal products such as milk, cheese, and meat. Limiting these foods may help you lose weight and also lower your risk for heart disease. · Do not smoke. Smoking increases your risk for heart attack and stroke. If you need help quitting, talk to your doctor about stop-smoking programs and medicines. These can increase your chances of quitting for good. When should you call for help? Call 911 anytime you think you may need emergency care. This may mean having symptoms that suggest that your blood pressure is causing a serious heart or blood vessel problem. Your blood pressure may be over 180/110. For example, call 911 if: · You have symptoms of a heart attack. These may include: ¨ Chest pain or pressure, or a strange feeling in the chest. 
¨ Sweating. ¨ Shortness of breath. ¨ Nausea or vomiting. ¨ Pain, pressure, or a strange feeling in the back, neck, jaw, or upper belly or in one or both shoulders or arms. ¨ Lightheadedness or sudden weakness. ¨ A fast or irregular heartbeat. · You have symptoms of a stroke. These may include: 
¨ Sudden numbness, tingling, weakness, or loss of movement in your face, arm, or leg, especially on only one side of your body. ¨ Sudden vision changes. ¨ Sudden trouble speaking. ¨ Sudden confusion or trouble understanding simple statements. ¨ Sudden problems with walking or balance. ¨ A sudden, severe headache that is different from past headaches. · You have severe back or belly pain. Do not wait until your blood pressure comes down on its own. Get help right away. Call your doctor now or seek immediate care if: 
· Your blood pressure is much higher than normal (such as 180/110 or higher), but you don't have symptoms. · You think high blood pressure is causing symptoms, such as: ¨ Severe headache. ¨ Blurry vision. Watch closely for changes in your health, and be sure to contact your doctor if: 
· Your blood pressure measures 140/90 or higher at least 2 times. That means the top number is 140 or higher or the bottom number is 90 or higher, or both. · You think you may be having side effects from your blood pressure medicine. · Your blood pressure is usually normal, but it goes above normal at least 2 times. Where can you learn more? Go to http://rayray-bonifacio.info/. Enter V520 in the search box to learn more about \"High Blood Pressure: Care Instructions. \" Current as of: August 8, 2016 Content Version: 11.2 © 4739-8602 [employer].  Care instructions adapted under license by [employer] (which disclaims liability or warranty for this information). If you have questions about a medical condition or this instruction, always ask your healthcare professional. [address] any warranty or liability for your use of this information. Patient Instructions History Introducing [facility]! Rosaura Big Indian introduces [employer] patient portal. Now you can access parts of your medical record, email your doctor's office, and request medication refills online. 1. In your internet browser, go to [email or web address] 2. Click on the First Time User? Click Here link in the Sign In box. You will see the New Member Sign Up page. 3. Enter your [employer] Access Code exactly as it appears below. You will not need to use this code after youve completed the sign-up process. If you do not sign up before the expiration date, you must request a new code. · [employer] Access Code: [email or web address] Expires: 8/14/2017  2:44 PM 
 
4. Enter the last four digits of your Social Security Number (xxxx) and Date of Birth (mm/dd/yyyy) as indicated and click Submit. You will be taken to the next sign-up page. 5. Create a [employer] ID. This will be your [employer] login ID and cannot be changed, so think of one that is secure and easy to remember. 6. Create a [employer] password. You can change your password at any time. 7. Enter your Password Reset Question and Answer. This can be used at a later time if you forget your password. 8. Enter your e-mail address. You will receive e-mail notification when new information is available in [address] 9. Click Sign Up. You can now view and download portions of your medical record. 10. Click the Download Summary menu link to download a portable copy of your medical information. If you have questions, please visit the Frequently Asked Questions section of the [employer] website. Remember, [employer] is NOT to be used for urgent needs. For medical emergencies, dial 911. Now available from your iPhone and Android! Please provide this summary of care documentation to your next provider. Your primary care clinician is listed as Selvin Mclaughlin. If you have any questions after today's visit, please call [phone].

## 2017-06-14 NOTE — PROGRESS NOTES
HISTORY OF PRESENT ILLNESS  Lexi Singer is a 73 y.o. male. Palpitations    The history is provided by the patient. The current episode started more than 1 week ago. The problem has been rapidly improving. The problem occurs rarely. Associated symptoms include shortness of breath. Pertinent negatives include no diaphoresis, no fever, no claudication, no near-syncope, no orthopnea, no PND, no syncope, no nausea, no vomiting, no leg pain, no dizziness, no weakness, no cough, no hemoptysis and no sputum production. Risk factors include dyslipidemia, hypertension and male gender. His past medical history is significant for hypertension and atrial fibrillation. His past medical history does not include hyperthyroidism. Shortness of Breath   The history is provided by the patient. This is a chronic problem. The problem occurs intermittently. The current episode started more than 1 week ago. The problem has not changed since onset. Pertinent negatives include no fever, no ear pain, no neck pain, no cough, no sputum production, no hemoptysis, no wheezing, no PND, no orthopnea, no syncope, no vomiting, no rash, no leg pain, no leg swelling and no claudication. He has had prior hospitalizations. Associated medical issues do not include chronic lung disease, CAD or heart failure. Hypertension   The history is provided by the patient. This is a chronic problem. The current episode started more than 1 week ago. The problem occurs every several days. The problem has not changed since onset. Associated symptoms include shortness of breath. Review of Systems   Constitutional: Negative for chills, diaphoresis, fever and weight loss. HENT: Negative for ear pain and hearing loss. Eyes: Negative for blurred vision. Respiratory: Positive for shortness of breath. Negative for cough, hemoptysis, sputum production, wheezing and stridor. Cardiovascular: Positive for palpitations.  Negative for orthopnea, claudication, leg swelling, syncope, PND and near-syncope. Gastrointestinal: Negative for heartburn, nausea and vomiting. Musculoskeletal: Negative for myalgias and neck pain. Skin: Negative for rash. Neurological: Negative for dizziness, tingling, tremors, focal weakness, loss of consciousness and weakness. Psychiatric/Behavioral: Negative for depression and suicidal ideas. Family History   Problem Relation Age of Onset    Hypertension Mother     Heart Attack Father     Pacemaker Father        Past Medical History:   Diagnosis Date    AF (atrial fibrillation) ([facility] [address] 9/3/09    Atrial flutter (HCC)     BPH (benign prostatic hypertrophy) 9/3/09    Carotid duplex 09/14/05    No stenosis bilaterally     Diabetes mellitus, type 2 ([facility] [address]     Echocardiogram 10/27/2011    EF 60-65%. Gr 1 DDfx. RVSP 30 mmHg. Mild LAE. Mild MR. Diastolic doming of mitral valve. Mild IVCE.     Elevated PSA     Hypercholesterolemia     dislipidemia    Hypertension     Hypothyroidism     on replacement therapy    Long term (current) use of anticoagulants 3/16/2011    Lower extremity arterial duplex 03/16/09    No evidence of arterial insufficiency bilaterally at rest    Paroxysmal atrial fibrillation (HCC)     Personal history of urinary calculi 9/3/09    Stress thallium 08/07/08    Very small scarring in basal inferior wall w/min ischemia likely artifact; EF 68%; rapid AF during exercise; EKG portion negative which was terminated prematurely because of the persistent rapid AF       Past Surgical History:   Procedure Laterality Date    ABDOMEN SURGERY PROC UNLISTED  1991    Colon surgical repair following puncture during removal of polyps    HX HERNIA REPAIR      1974, left; 1997, right    HX LITHOTRIPSY  10/2004    Removed kidney stones    HX OTHER SURGICAL  7/2015    Scar tissue removed from bowels    HX TONSILLECTOMY      age 12       Social History   Substance Use Topics    Smoking status: Former Smoker Packs/day: 1.50     Years: 35.00     Quit date: 10/11/1980    Smokeless tobacco: Never Used    Alcohol use No       Allergies   Allergen Reactions    Iodinated Contrast Media - Oral And Iv Dye Unknown (comments)     States no allergy to iodine    Other Medication Unknown (comments)     Anesthesia (nausea)       Outpatient Prescriptions Marked as Taking for the 6/14/17 encounter (Office Visit) with Libby Norris MD   Medication Sig Dispense Refill    warfarin (COUMADIN) 10 mg tablet Take 10 mg by mouth daily. Take 1 tablet every other day then 7.5 mg      dutasteride (AVODART) 0.5 mg capsule Take 1 Cap by mouth two (2) days a week. 30 Cap 3    digoxin (DIGOX) 0.125 mg tablet Take 1 Tab by mouth daily. 90 Tab 3    metoprolol tartrate (LOPRESSOR) 50 mg tablet TAKE (1) TABLET TWICE A DAY (Patient taking differently: TAKE (1) TABLET once A DAY) 180 Tab 0    ipratropium (ATROVENT) 0.06 % nasal spray 2 Sprays four (4) times daily.  levothyroxine (SYNTHROID) 125 mcg tablet Take 125 mcg by mouth Daily (before breakfast).  Cholecalciferol, Vitamin D3, 5,000 unit Tab Take 2,000 Units by mouth.  lispro-protamine & lispro (HUMALOG MIX 50-50) 100 unit/mL (50-50) flexpen by SubCUTAneous route two (2) times a day. 22 units Q AM, 10 units QPM           Visit Vitals    /71    Pulse 90    Ht 6' (1.829 m)    Wt 72.1 kg (159 lb)    BMI 21.56 kg/m2     Physical Exam   Constitutional: He is oriented to person, place, and time. He appears well-developed and well-nourished. No distress. HENT:   Head: Atraumatic. Mouth/Throat: No oropharyngeal exudate. Eyes: Conjunctivae are normal. No scleral icterus. Neck: Neck supple. No JVD present. Cardiovascular: Normal rate and regular rhythm. Exam reveals no gallop. Murmur (3/6 holosystolic murmur best heard at mitral area with no radiation) heard. Pulmonary/Chest: Effort normal and breath sounds normal. No stridor. He has no wheezes.  He has no rales. Abdominal: Soft. There is no tenderness. There is no rebound. Musculoskeletal: Normal range of motion. He exhibits no edema or tenderness. Neurological: He is alert and oriented to person, place, and time. He exhibits normal muscle tone. Skin: Skin is warm. He is not diaphoretic. Psychiatric: He has a normal mood and affect. His behavior is normal.     ekg atrial fibrillaton with controlled ventricular response. Echo 05/2017:  SUMMARY:  Left ventricle: Systolic function was normal. Ejection fraction was  estimated in the range of 55 % to 60 %. There were no regional wall motion  abnormalities. Doppler parameters were consistent with restrictive  physiology, indicative of decreased left ventricular diastolic compliance  and/or increased left atrial pressure. Left atrium: The atrium was severely dilated. LA volume index was 61.72  ml/mï¾². Right atrium: The atrium was dilated. Mitral valve: There was mild annular calcification. There was mild to  moderate regurgitation. Tricuspid valve: There was mild to moderate regurgitation. Pulmonary  artery systolic pressure: 45 mmHg. There was mild pulmonary hypertension. Inferior vena cava, hepatic veins: The respirophasic change in diameter  was less than 50%. ASSESSMENT and PLAN    ICD-10-CM ICD-9-CM    1. Persistent atrial fibrillation (HCC) I48.92 427.32 AMB POC EKG ROUTINE W/ 12 LEADS, INTER & REP   2. Hypertension I11.9 402.10    3. Dyslipidemia E78.5 272.4    4. Moderate mitral regurgitation I34.0 424.0    5. Type 2 diabetes mellitus without complication, unspecified long term insulin use status E11.9 250.00      Follow-up Disposition:  Return in about 6 months (around 12/14/2017). Patient on lopressor and digoxin for rate control. Continue coumadin for anticoagulation. Patient prefers rate control. Rhythm control discussed with patient.   Follow up in 6months

## 2017-06-27 ENCOUNTER — TELEPHONE ANTICOAG (OUTPATIENT)
Dept: CARDIOLOGY CLINIC | Age: 72
End: 2017-06-27

## 2017-06-27 DIAGNOSIS — I48.20 CHRONIC ATRIAL FIBRILLATION (HCC): ICD-10-CM

## 2017-06-27 LAB — INR, EXTERNAL: 2.7

## 2017-06-27 NOTE — PATIENT INSTRUCTIONS
Called patient instructed patient to continue current dose of coumadin. Recheck INR in 2 weeks on 7/11/2017. He voices understanding and acceptance of this advice and will call back if any further questions or concerns.

## 2017-07-11 ENCOUNTER — TELEPHONE ANTICOAG (OUTPATIENT)
Dept: CARDIOLOGY CLINIC | Age: 72
End: 2017-07-11

## 2017-07-11 DIAGNOSIS — I48.91 ATRIAL FIBRILLATION, UNSPECIFIED TYPE (HCC): ICD-10-CM

## 2017-07-11 LAB — INR, EXTERNAL: 2.1

## 2017-07-11 NOTE — PATIENT INSTRUCTIONS
Called patient instructed patient to continue current dose of coumadin. Recheck INR in 2 weeks on 7/25/17. He voices understanding and acceptance of this advice and will call back if any further questions or concerns.

## 2017-07-25 ENCOUNTER — TELEPHONE ANTICOAG (OUTPATIENT)
Dept: CARDIOLOGY CLINIC | Age: 72
End: 2017-07-25

## 2017-07-25 DIAGNOSIS — I48.91 ATRIAL FIBRILLATION, UNSPECIFIED TYPE (HCC): ICD-10-CM

## 2017-07-25 LAB — INR, EXTERNAL: 2.5

## 2017-07-25 NOTE — PATIENT INSTRUCTIONS
Patient will continue to alternate coumadin 10mg and 7.5mg every evening and repeat INR in 2 weeks, 8/8/17. He voices understanding and acceptance of this advice and will call back if any further questions or concerns.

## 2017-08-08 ENCOUNTER — TELEPHONE ANTICOAG (OUTPATIENT)
Dept: CARDIOLOGY CLINIC | Age: 72
End: 2017-08-08

## 2017-08-08 DIAGNOSIS — I48.91 ATRIAL FIBRILLATION, UNSPECIFIED TYPE (HCC): ICD-10-CM

## 2017-08-08 LAB — INR, EXTERNAL: 2.4

## 2017-08-22 ENCOUNTER — TELEPHONE ANTICOAG (OUTPATIENT)
Dept: CARDIOLOGY CLINIC | Age: 72
End: 2017-08-22

## 2017-08-22 DIAGNOSIS — I48.91 ATRIAL FIBRILLATION, UNSPECIFIED TYPE (HCC): ICD-10-CM

## 2017-08-22 LAB — INR, EXTERNAL: 2.3

## 2017-09-06 ENCOUNTER — TELEPHONE ANTICOAG (OUTPATIENT)
Dept: CARDIOLOGY CLINIC | Age: 72
End: 2017-09-06

## 2017-09-06 DIAGNOSIS — I48.91 ATRIAL FIBRILLATION, UNSPECIFIED TYPE (HCC): ICD-10-CM

## 2017-09-06 LAB — INR, EXTERNAL: 2.5

## 2017-09-19 ENCOUNTER — TELEPHONE ANTICOAG (OUTPATIENT)
Dept: CARDIOLOGY CLINIC | Age: 72
End: 2017-09-19

## 2017-09-19 DIAGNOSIS — I48.91 ATRIAL FIBRILLATION, UNSPECIFIED TYPE (HCC): ICD-10-CM

## 2017-09-19 LAB — INR, EXTERNAL: 2.5

## 2017-09-19 NOTE — PATIENT INSTRUCTIONS
Patient called and message left to continue alternating coumadin 10mg and 7.5mg and repeat INR in 2 weeks.

## 2017-10-03 ENCOUNTER — TELEPHONE ANTICOAG (OUTPATIENT)
Dept: CARDIOLOGY CLINIC | Age: 72
End: 2017-10-03

## 2017-10-03 DIAGNOSIS — I48.91 ATRIAL FIBRILLATION, UNSPECIFIED TYPE (HCC): ICD-10-CM

## 2017-10-03 LAB — INR, EXTERNAL: 2.2

## 2017-10-03 NOTE — PATIENT INSTRUCTIONS
Patient called to review INR, he will continue alternating coumadin 10mg and 7.5mg and repeat INR in 2 weeks. He voices understanding and acceptance of this advice and will call back if any further questions or concerns.

## 2017-10-17 ENCOUNTER — TELEPHONE ANTICOAG (OUTPATIENT)
Dept: CARDIOLOGY CLINIC | Age: 72
End: 2017-10-17

## 2017-10-17 DIAGNOSIS — I48.91 ATRIAL FIBRILLATION, UNSPECIFIED TYPE (HCC): ICD-10-CM

## 2017-10-17 LAB — INR, EXTERNAL: 2.1

## 2017-10-17 NOTE — PATIENT INSTRUCTIONS
Patient will continue alternating coumadin 10mg and 7.5mg and repeat INR in 2 weeks. Spoke with patient's wife. She voices understanding and acceptance of this advice and will call back if any further questions or concerns.

## 2017-11-01 ENCOUNTER — TELEPHONE ANTICOAG (OUTPATIENT)
Dept: CARDIOLOGY CLINIC | Age: 72
End: 2017-11-01

## 2017-11-01 DIAGNOSIS — I48.91 ATRIAL FIBRILLATION, UNSPECIFIED TYPE (HCC): ICD-10-CM

## 2017-11-01 LAB — INR, EXTERNAL: 2.8

## 2017-11-01 NOTE — PATIENT INSTRUCTIONS
Patient will continue alternating coumadin 7.5mg and 10mg and repeat INR in 2 weeks. He voices understanding and acceptance of this advice and will call back if any further questions or concerns.

## 2017-11-14 ENCOUNTER — TELEPHONE ANTICOAG (OUTPATIENT)
Dept: CARDIOLOGY CLINIC | Age: 72
End: 2017-11-14

## 2017-11-14 DIAGNOSIS — I48.91 ATRIAL FIBRILLATION, UNSPECIFIED TYPE (HCC): ICD-10-CM

## 2017-11-14 LAB — INR, EXTERNAL: 2.5

## 2017-11-14 NOTE — PATIENT INSTRUCTIONS
Patient called to discuss results of INR. He will continue alternating coumadin 10mg and 7.5mg and repeat INR in 2 weeks. He voices understanding and acceptance of this advice and will call back if any further questions or concerns.

## 2017-11-28 ENCOUNTER — TELEPHONE ANTICOAG (OUTPATIENT)
Dept: CARDIOLOGY CLINIC | Age: 72
End: 2017-11-28

## 2017-11-28 DIAGNOSIS — I48.91 ATRIAL FIBRILLATION, UNSPECIFIED TYPE (HCC): ICD-10-CM

## 2017-11-28 LAB — INR, EXTERNAL: 2.4

## 2017-11-28 NOTE — PATIENT INSTRUCTIONS
Patient called and message left for patient to continue alternating coumadin 7.5mg and 10mg and repeat INR in 2 weeks. Return call requested for any questions or concerns.

## 2017-12-06 ENCOUNTER — OFFICE VISIT (OUTPATIENT)
Dept: CARDIOLOGY CLINIC | Age: 72
End: 2017-12-06

## 2017-12-06 VITALS
HEART RATE: 89 BPM | SYSTOLIC BLOOD PRESSURE: 118 MMHG | BODY MASS INDEX: 21.26 KG/M2 | DIASTOLIC BLOOD PRESSURE: 68 MMHG | WEIGHT: 157 LBS | HEIGHT: 72 IN

## 2017-12-06 DIAGNOSIS — I34.0 MODERATE MITRAL REGURGITATION: ICD-10-CM

## 2017-12-06 DIAGNOSIS — I48.19 PERSISTENT ATRIAL FIBRILLATION (HCC): Primary | ICD-10-CM

## 2017-12-06 DIAGNOSIS — I11.9 BENIGN HYPERTENSIVE HEART DISEASE WITHOUT HEART FAILURE: ICD-10-CM

## 2017-12-06 DIAGNOSIS — E03.4 HYPOTHYROIDISM DUE TO ACQUIRED ATROPHY OF THYROID: ICD-10-CM

## 2017-12-06 RX ORDER — METOPROLOL SUCCINATE 25 MG/1
TABLET, EXTENDED RELEASE ORAL DAILY
COMMUNITY
End: 2017-12-28 | Stop reason: SDUPTHER

## 2017-12-06 NOTE — PROGRESS NOTES
HISTORY OF PRESENT ILLNESS  Joel Owens is a 81 y.o. male. Palpitations    The history is provided by the patient. The current episode started more than 1 week ago. The problem has been rapidly improving. The problem occurs rarely. Associated symptoms include shortness of breath. Pertinent negatives include no diaphoresis, no fever, no claudication, no near-syncope, no orthopnea, no PND, no syncope, no nausea, no vomiting, no leg pain, no dizziness, no weakness, no cough, no hemoptysis and no sputum production. Risk factors include dyslipidemia, hypertension and male gender. His past medical history is significant for hypertension and atrial fibrillation. His past medical history does not include hyperthyroidism. Shortness of Breath   The history is provided by the patient. This is a chronic problem. The problem occurs intermittently. The current episode started more than 1 week ago. The problem has not changed since onset. Pertinent negatives include no fever, no ear pain, no neck pain, no cough, no sputum production, no hemoptysis, no wheezing, no PND, no orthopnea, no syncope, no vomiting, no rash, no leg pain, no leg swelling and no claudication. He has had prior hospitalizations. Associated medical issues do not include chronic lung disease, CAD or heart failure. Hypertension   The history is provided by the patient. This is a chronic problem. The current episode started more than 1 week ago. The problem occurs every several days. The problem has not changed since onset. Associated symptoms include shortness of breath. Review of Systems   Constitutional: Negative for chills, diaphoresis, fever and weight loss. HENT: Negative for ear pain and hearing loss. Eyes: Negative for blurred vision. Respiratory: Positive for shortness of breath. Negative for cough, hemoptysis, sputum production, wheezing and stridor. Cardiovascular: Positive for palpitations.  Negative for orthopnea, claudication, leg swelling, syncope, PND and near-syncope. Gastrointestinal: Negative for heartburn, nausea and vomiting. Musculoskeletal: Negative for myalgias and neck pain. Skin: Negative for rash. Neurological: Negative for dizziness, tingling, tremors, focal weakness, loss of consciousness and weakness. Psychiatric/Behavioral: Negative for depression and suicidal ideas. Family History   Problem Relation Age of Onset    Hypertension Mother     Heart Attack Father     Pacemaker Father        Past Medical History:   Diagnosis Date    AF (atrial fibrillation) ([facility] [address] 9/3/09    Atrial flutter (HCC)     BPH (benign prostatic hypertrophy) 9/3/09    Carotid duplex 09/14/05    No stenosis bilaterally     Diabetes mellitus, type 2 ([facility] [address]     Echocardiogram 10/27/2011    EF 60-65%. Gr 1 DDfx. RVSP 30 mmHg. Mild LAE. Mild MR. Diastolic doming of mitral valve. Mild IVCE.     Elevated PSA     Hypercholesterolemia     dislipidemia    Hypertension     Hypothyroidism     on replacement therapy    Long term (current) use of anticoagulants 3/16/2011    Lower extremity arterial duplex 03/16/09    No evidence of arterial insufficiency bilaterally at rest    Paroxysmal atrial fibrillation (HCC)     Personal history of urinary calculi 9/3/09    Stress thallium 08/07/08    Very small scarring in basal inferior wall w/min ischemia likely artifact; EF 68%; rapid AF during exercise; EKG portion negative which was terminated prematurely because of the persistent rapid AF       Past Surgical History:   Procedure Laterality Date    ABDOMEN SURGERY PROC UNLISTED  1991    Colon surgical repair following puncture during removal of polyps    HX HERNIA REPAIR      1974, left; 1997, right    HX LITHOTRIPSY  10/2004    Removed kidney stones    HX OTHER SURGICAL  7/2015    Scar tissue removed from bowels    HX TONSILLECTOMY      age 12       Social History   Substance Use Topics    Smoking status: Former Smoker Packs/day: 1.50     Years: 35.00     Quit date: 10/11/1980    Smokeless tobacco: Never Used    Alcohol use No       Allergies   Allergen Reactions    Iodinated Contrast- Oral And Iv Dye Unknown (comments)     States no allergy to iodine    Other Medication Unknown (comments)     Anesthesia (nausea)       Outpatient Prescriptions Marked as Taking for the 12/6/17 encounter (Office Visit) with Koffi Avila MD   Medication Sig Dispense Refill    metoprolol succinate (TOPROL-XL) 25 mg XL tablet Take  by mouth daily.  warfarin (COUMADIN) 10 mg tablet Take 10 mg by mouth daily. Take 1 tablet every other day then 7.5 mg      dutasteride (AVODART) 0.5 mg capsule Take 1 Cap by mouth two (2) days a week. 30 Cap 3    digoxin (DIGOX) 0.125 mg tablet Take 1 Tab by mouth daily. 90 Tab 3    ipratropium (ATROVENT) 0.06 % nasal spray 2 Sprays four (4) times daily.  lisinopril (PRINIVIL, ZESTRIL) 5 mg tablet 1 tablet by mouth daily, Hold medication if systolic less than 779 or diastolic less than 60 90 Tab 3    levothyroxine (SYNTHROID) 125 mcg tablet Take 125 mcg by mouth Daily (before breakfast).  Cholecalciferol, Vitamin D3, 5,000 unit Tab Take 2,000 Units by mouth.  lispro-protamine & lispro (HUMALOG MIX 50-50) 100 unit/mL (50-50) flexpen by SubCUTAneous route two (2) times a day. 22 units Q AM, 10 units QPM           Visit Vitals    /68    Pulse 89    Ht 6' (1.829 m)    Wt 71.2 kg (157 lb)    BMI 21.29 kg/m2     Physical Exam   Constitutional: He is oriented to person, place, and time. He appears well-developed and well-nourished. No distress. HENT:   Head: Atraumatic. Mouth/Throat: No oropharyngeal exudate. Eyes: Conjunctivae are normal. No scleral icterus. Neck: Neck supple. No JVD present. Cardiovascular: Normal rate and regular rhythm. Exam reveals no gallop. Murmur (3/6 holosystolic murmur best heard at mitral area with no radiation) heard.   Pulmonary/Chest: Effort normal and breath sounds normal. No stridor. He has no wheezes. He has no rales. Abdominal: Soft. There is no tenderness. There is no rebound. Musculoskeletal: Normal range of motion. He exhibits no edema or tenderness. Neurological: He is alert and oriented to person, place, and time. He exhibits normal muscle tone. Skin: Skin is warm. He is not diaphoretic. Psychiatric: He has a normal mood and affect. His behavior is normal.     ekg atrial fibrillaton with controlled ventricular response. Echo 05/2017:  SUMMARY:  Left ventricle: Systolic function was normal. Ejection fraction was  estimated in the range of 55 % to 60 %. There were no regional wall motion  abnormalities. Doppler parameters were consistent with restrictive  physiology, indicative of decreased left ventricular diastolic compliance  and/or increased left atrial pressure. Left atrium: The atrium was severely dilated. LA volume index was 61.72  ml/mï¾². Right atrium: The atrium was dilated. Mitral valve: There was mild annular calcification. There was mild to  moderate regurgitation. Tricuspid valve: There was mild to moderate regurgitation. Pulmonary  artery systolic pressure: 45 mmHg. There was mild pulmonary hypertension. Inferior vena cava, hepatic veins: The respirophasic change in diameter  was less than 50%. ASSESSMENT and PLAN    ICD-10-CM ICD-9-CM    1. Persistent atrial fibrillation (HCC) I48.1 427.31    2. Hypertension I11.9 402.10    3. Hypothyroidism due to acquired atrophy of thyroid E03.4 244.8      246.8    4. Moderate mitral regurgitation I34.0 424.0      Follow-up Disposition:  Return in about 6 months (around 6/6/2018). Patient on lopressor and digoxin for rate control. Continue coumadin for anticoagulation. Patient prefers rate control. Rhythm control discussed with patient.   Follow up in 6months  No new symptoms since the last visit

## 2017-12-06 NOTE — MR AVS SNAPSHOT
Visit Information Date & Time Provider Department Dept. Phone Encounter #  
 12/6/2017 10:30 AM Minus MD [facility] [place] [id number] Follow-up Instructions Return in about 6 months (around 6/6/2018). Your Appointments 3/27/2018 10:30 AM  
Nurse Visit with [facility] NURSE [facility] ([facility]) Appt Note: PSA  
 [address] 
[address] [facility] [address]  
[address] [facility] [address] [facility] [address]  
  
    
  
 4/3/2018  1:00 PM  
Any with Hardeep Khan MD  
[facility] ([facility]) Appt Note: 1 yr fu  
 [address] 
[address] [facility] [address]  
[address] [facility] [address] [facility] [address] Upcoming Health Maintenance Date Due Hepatitis C Screening 1945 FOOT EXAM Q1 7/1/1957 EYE EXAM RETINAL OR DILATED Q1 7/1/1957 DTaP/Tdap/Td series (1 - Tdap) 7/1/1968 FOBT Q 1 YEAR AGE 50-75 7/1/1997 ZOSTER VACCINE AGE 60> 5/1/2007 GLAUCOMA SCREENING Q2Y 7/1/2012 Pneumococcal 65+ Low/Medium Risk (1 of 2 - PCV13) 7/1/2012 MEDICARE YEARLY EXAM 7/1/2012 HEMOGLOBIN A1C Q6M 5/18/2016 MICROALBUMIN Q1 11/18/2016 LIPID PANEL Q1 11/18/2016 Influenza Age 5 to Adult 8/1/2017 Allergies as of 12/6/2017  Review Complete On: 12/6/2017 By: Reed Coulter Severity Noted Reaction Type Reactions Iodinated Contrast- Oral And Iv Dye    Unknown (comments) States no allergy to iodine Other Medication    Unknown (comments) Anesthesia (nausea) Current Immunizations  Never Reviewed No immunizations on file. Not reviewed this visit You Were Diagnosed With   
  
 Codes Comments Persistent atrial fibrillation (HCC)    -  Primary ICD-10-CM: I48.1 ICD-9-CM: 427.31 Benign hypertensive heart disease without heart failure     ICD-10-CM: I11.9 ICD-9-CM: 402.10 Hypothyroidism due to acquired atrophy of thyroid     ICD-10-CM: E03.4 ICD-9-CM: 244.8, 246.8 Moderate mitral regurgitation     ICD-10-CM: I34.0 ICD-9-CM: 424.0 Vitals BP Pulse Height(growth percentile) Weight(growth percentile) BMI Smoking Status 118/68 89 6' (1.829 m) 157 lb (71.2 kg) 21.29 kg/m2 Former Smoker Vitals History BMI and BSA Data Body Mass Index Body Surface Area  
 21.29 kg/m 2 1.9 m 2 Preferred Pharmacy Pharmacy Name Phone  
 [address] [place] [address] [phone] Your Updated Medication List  
  
   
This list is accurate as of: 12/6/17 10:55 AM.  Always use your most recent med list.  
  
  
  
  
 cholecalciferol (VITAMIN D3) 5,000 unit Tab tablet Commonly known as:  VITAMIN D3 Take 2,000 Units by mouth. digoxin 0.125 mg tablet Commonly known as:  [address] [address] Take 1 Tab by mouth daily. dutasteride 0.5 mg capsule Commonly known as:  AVODART Take 1 Cap by mouth two (2) days a week. HumaLOG Mix 50-50 100 unit/mL (50-50) flexpen Generic drug:  insulin lispro protamine/insulin lispro  
by SubCUTAneous route two (2) times a day. 22 units Q AM, 10 units QPM  
  
 ipratropium 0.06 % nasal spray Commonly known as:  ATROVENT  
2 Sprays four (4) times daily. levothyroxine 125 mcg tablet Commonly known as:  SYNTHROID Take 125 mcg by mouth Daily (before breakfast). lisinopril 5 mg tablet Commonly known as:  PRINIVIL, ZESTRIL  
1 tablet by mouth daily, Hold medication if systolic less than 422 or diastolic less than 60  
  
 metoprolol succinate 25 mg XL tablet Commonly known as:  TOPROL-XL Take  by mouth daily. warfarin 10 mg tablet Commonly known as:  COUMADIN Take 10 mg by mouth daily. Take 1 tablet every other day then 7.5 mg Follow-up Instructions Return in about 6 months (around 6/6/2018). Patient Instructions High Blood Pressure: Care Instructions Your Care Instructions If your blood pressure is usually above 140/90, you have high blood pressure, or hypertension. That means the top number is 140 or higher or the bottom number is 90 or higher, or both. Despite what a lot of people think, high blood pressure usually doesn't cause headaches or make you feel dizzy or lightheaded. It usually has no symptoms. But it does increase your risk for heart attack, stroke, and kidney or eye damage. The higher your blood pressure, the more your risk increases. Your doctor will give you a goal for your blood pressure. Your goal will be based on your health and your age. An example of a goal is to keep your blood pressure below 140/90. Lifestyle changes, such as eating healthy and being active, are always important to help lower blood pressure. You might also take medicine to reach your blood pressure goal. 
Follow-up care is a key part of your treatment and safety. Be sure to make and go to all appointments, and call your doctor if you are having problems. It's also a good idea to know your test results and keep a list of the medicines you take. How can you care for yourself at home? Medical treatment · If you stop taking your medicine, your blood pressure will go back up. You may take one or more types of medicine to lower your blood pressure. Be safe with medicines. Take your medicine exactly as prescribed. Call your doctor if you think you are having a problem with your medicine. · Talk to your doctor before you start taking aspirin every day. Aspirin can help certain people lower their risk of a heart attack or stroke. But taking aspirin isn't right for everyone, because it can cause serious bleeding. · See your doctor regularly. You may need to see the doctor more often at first or until your blood pressure comes down.  
· If you are taking blood pressure medicine, talk to your doctor before you take decongestants or anti-inflammatory medicine, such as ibuprofen. Some of these medicines can raise blood pressure. · Learn how to check your blood pressure at home. Lifestyle changes · Stay at a healthy weight. This is especially important if you put on weight around the waist. Losing even 10 pounds can help you lower your blood pressure. · If your doctor recommends it, get more exercise. Walking is a good choice. Bit by bit, increase the amount you walk every day. Try for at least 30 minutes on most days of the week. You also may want to swim, bike, or do other activities. · Avoid or limit alcohol. Talk to your doctor about whether you can drink any alcohol. · Try to limit how much sodium you eat to less than 2,300 milligrams (mg) a day. Your doctor may ask you to try to eat less than 1,500 mg a day. · Eat plenty of fruits (such as bananas and oranges), vegetables, legumes, whole grains, and low-fat dairy products. · Lower the amount of saturated fat in your diet. Saturated fat is found in animal products such as milk, cheese, and meat. Limiting these foods may help you lose weight and also lower your risk for heart disease. · Do not smoke. Smoking increases your risk for heart attack and stroke. If you need help quitting, talk to your doctor about stop-smoking programs and medicines. These can increase your chances of quitting for good. When should you call for help? Call 911 anytime you think you may need emergency care. This may mean having symptoms that suggest that your blood pressure is causing a serious heart or blood vessel problem. Your blood pressure may be over 180/110. ? For example, call 911 if: 
? · You have symptoms of a heart attack. These may include: ¨ Chest pain or pressure, or a strange feeling in the chest. 
¨ Sweating. ¨ Shortness of breath. ¨ Nausea or vomiting.  
¨ Pain, pressure, or a strange feeling in the back, neck, jaw, or upper belly or in one or both shoulders or arms. ¨ Lightheadedness or sudden weakness. ¨ A fast or irregular heartbeat. ? · You have symptoms of a stroke. These may include: 
¨ Sudden numbness, tingling, weakness, or loss of movement in your face, arm, or leg, especially on only one side of your body. ¨ Sudden vision changes. ¨ Sudden trouble speaking. ¨ Sudden confusion or trouble understanding simple statements. ¨ Sudden problems with walking or balance. ¨ A sudden, severe headache that is different from past headaches. ? · You have severe back or belly pain. ?Do not wait until your blood pressure comes down on its own. Get help right away. ?Call your doctor now or seek immediate care if: 
? · Your blood pressure is much higher than normal (such as 180/110 or higher), but you don't have symptoms. ? · You think high blood pressure is causing symptoms, such as: ¨ Severe headache. ¨ Blurry vision. ? Watch closely for changes in your health, and be sure to contact your doctor if: 
? · Your blood pressure measures 140/90 or higher at least 2 times. That means the top number is 140 or higher or the bottom number is 90 or higher, or both. ? · You think you may be having side effects from your blood pressure medicine. ? · Your blood pressure is usually normal, but it goes above normal at least 2 times. Where can you learn more? Go to http://rayray-bonifacio.info/. Enter J221 in the search box to learn more about \"High Blood Pressure: Care Instructions. \" Current as of: September 21, 2016 Content Version: 11.4 © 3071-2535 [employer] ([employer]). Care instructions adapted under license by [employer] (which disclaims liability or warranty for this information). If you have questions about a medical condition or this instruction, always ask your healthcare professional. [address] any warranty or liability for your use of this information. Introducing [facility]! [employer] introduces [employer] patient portal. Now you can access parts of your medical record, email your doctor's office, and request medication refills online. 1. In your internet browser, go to [email or web address] 2. Click on the First Time User? Click Here link in the Sign In box. You will see the New Member Sign Up page. 3. Enter your [employer] Access Code exactly as it appears below. You will not need to use this code after youve completed the sign-up process. If you do not sign up before the expiration date, you must request a new code. · [employer] Access Code: [email or web address] Expires: 2/26/2018  8:33 AM 
 
4. Enter the last four digits of your Social Security Number (xxxx) and Date of Birth (mm/dd/yyyy) as indicated and click Submit. You will be taken to the next sign-up page. 5. Create a Spark[employer] ID. This will be your [employer] login ID and cannot be changed, so think of one that is secure and easy to remember. 6. Create a [employer] password. You can change your password at any time. 7. Enter your Password Reset Question and Answer. This can be used at a later time if you forget your password. 8. Enter your e-mail address. You will receive e-mail notification when new information is available in [address] 9. Click Sign Up. You can now view and download portions of your medical record. 10. Click the Download Summary menu link to download a portable copy of your medical information. If you have questions, please visit the Frequently Asked Questions section of the [employer] website. Remember, [employer] is NOT to be used for urgent needs. For medical emergencies, dial 911. Now available from your iPhone and Android! Please provide this summary of care documentation to your next provider. Your primary care clinician is listed as Rudi Jason. If you have any questions after today's visit, please call [phone].

## 2017-12-06 NOTE — PROGRESS NOTES
1. Have you been to the ER, urgent care clinic since your last visit? Hospitalized since your last visit? No    2. Have you seen or consulted any other health care providers outside of the [address] [place] [address] since your last visit? Include any pap smears or colon screening. Yes Where: PCP     3. Since your last visit, have you had any of the following symptoms? .          4. Have you had any blood work, X-rays or cardiac testing? No            5.  Where do you normally have your labs drawn? [facility]    6. Do you need any refills today?    Yes

## 2017-12-12 ENCOUNTER — TELEPHONE ANTICOAG (OUTPATIENT)
Dept: CARDIOLOGY CLINIC | Age: 72
End: 2017-12-12

## 2017-12-12 DIAGNOSIS — I48.19 PERSISTENT ATRIAL FIBRILLATION (HCC): ICD-10-CM

## 2017-12-12 LAB — INR, EXTERNAL: 2.1

## 2017-12-12 NOTE — PATIENT INSTRUCTIONS
Called and left message concerning coumadin orders, instructed patient to continue current dose of coumadin and recheck INR in 2 weeks on 12/26/17. If any questions to call the office.

## 2017-12-26 ENCOUNTER — TELEPHONE ANTICOAG (OUTPATIENT)
Dept: CARDIOLOGY CLINIC | Age: 72
End: 2017-12-26

## 2017-12-26 LAB — INR, EXTERNAL: 3.1

## 2017-12-28 RX ORDER — METOPROLOL SUCCINATE 25 MG/1
25 TABLET, EXTENDED RELEASE ORAL DAILY
Qty: 90 TAB | Refills: 3 | Status: SHIPPED | OUTPATIENT
Start: 2017-12-28 | End: 2019-02-19 | Stop reason: SDUPTHER

## 2017-12-28 RX ORDER — WARFARIN SODIUM 5 MG/1
5 TABLET ORAL DAILY
Qty: 30 TAB | Refills: 2 | Status: SHIPPED | OUTPATIENT
Start: 2017-12-28 | End: 2017-12-29 | Stop reason: SDUPTHER

## 2017-12-28 NOTE — TELEPHONE ENCOUNTER
Please check this orders.  Should Coumadin 5 mg refill as patient is taking 10 mg alternate with 7.5 mg. Metoprolol XL should only once a day please make the proper correction and sent to me

## 2017-12-28 NOTE — TELEPHONE ENCOUNTER
Patient is taking 10 mg and alternating 7.5 mg tablets, but he dose better with 5 mg tab dose pills and he does

## 2017-12-29 RX ORDER — WARFARIN SODIUM 5 MG/1
TABLET ORAL
Qty: 90 TAB | Refills: 3 | Status: SHIPPED | OUTPATIENT
Start: 2017-12-29 | End: 2018-01-24 | Stop reason: SDUPTHER

## 2018-01-09 ENCOUNTER — TELEPHONE ANTICOAG (OUTPATIENT)
Dept: CARDIOLOGY CLINIC | Age: 73
End: 2018-01-09

## 2018-01-09 DIAGNOSIS — I48.91 ATRIAL FIBRILLATION, UNSPECIFIED TYPE (HCC): ICD-10-CM

## 2018-01-09 LAB — INR, EXTERNAL: 2.6

## 2018-01-09 NOTE — PATIENT INSTRUCTIONS
Called patient instructed patient to continue current dose of coumadin. Recheck INR in 2 weeks on 1/23/18. He voices understanding and acceptance of this advice and will call back if any further questions or concerns.

## 2018-01-23 ENCOUNTER — TELEPHONE ANTICOAG (OUTPATIENT)
Dept: CARDIOLOGY CLINIC | Age: 73
End: 2018-01-23

## 2018-01-23 DIAGNOSIS — I48.20 CHRONIC ATRIAL FIBRILLATION (HCC): ICD-10-CM

## 2018-01-23 LAB — INR, EXTERNAL: 3.4

## 2018-01-23 NOTE — PATIENT INSTRUCTIONS
Called and spoke to patient to hold coumadin tonight and then resume current dose of coumadin 10 mg and alternate 7.5 mg and recheck INR in 1 week on 1/30/18. He voices understanding and acceptance of this advice and will call back if any further questions or concerns.

## 2018-01-24 RX ORDER — WARFARIN SODIUM 5 MG/1
5 TABLET ORAL DAILY
Qty: 55 TAB | Refills: 3 | Status: SHIPPED | OUTPATIENT
Start: 2018-01-24 | End: 2018-03-19 | Stop reason: SDUPTHER

## 2018-01-24 NOTE — TELEPHONE ENCOUNTER
Requested Prescriptions     Pending Prescriptions Disp Refills    warfarin (COUMADIN) 5 mg tablet 55 Tab 3     Sig: Take 1 Tab by mouth daily.  Take 2 tablets daily or as directed by physician

## 2018-02-07 ENCOUNTER — TELEPHONE ANTICOAG (OUTPATIENT)
Dept: CARDIOLOGY CLINIC | Age: 73
End: 2018-02-07

## 2018-02-07 DIAGNOSIS — I48.20 CHRONIC ATRIAL FIBRILLATION (HCC): ICD-10-CM

## 2018-02-07 LAB — INR, EXTERNAL: 2.8

## 2018-02-13 LAB — INR, EXTERNAL: 2.8

## 2018-02-20 ENCOUNTER — TELEPHONE ANTICOAG (OUTPATIENT)
Dept: CARDIOLOGY CLINIC | Age: 73
End: 2018-02-20

## 2018-02-20 DIAGNOSIS — I48.91 ATRIAL FIBRILLATION, UNSPECIFIED TYPE (HCC): ICD-10-CM

## 2018-02-20 LAB — INR, EXTERNAL: 2.4

## 2018-03-06 ENCOUNTER — TELEPHONE ANTICOAG (OUTPATIENT)
Dept: CARDIOLOGY CLINIC | Age: 73
End: 2018-03-06

## 2018-03-06 LAB — INR, EXTERNAL: 2.7

## 2018-03-06 NOTE — PATIENT INSTRUCTIONS
Patient was given instructions and He voices understanding and acceptance of this advice and will call back if any further questions or concerns.

## 2018-03-19 DIAGNOSIS — I48.92 ATRIAL FLUTTER, UNSPECIFIED TYPE (HCC): ICD-10-CM

## 2018-03-19 RX ORDER — WARFARIN SODIUM 5 MG/1
TABLET ORAL
Qty: 55 TAB | Refills: 3 | Status: SHIPPED | OUTPATIENT
Start: 2018-03-19 | End: 2018-07-23 | Stop reason: SDUPTHER

## 2018-03-19 RX ORDER — DIGOXIN 125 MCG
0.12 TABLET ORAL DAILY
Qty: 90 TAB | Refills: 3 | Status: SHIPPED | OUTPATIENT
Start: 2018-03-19 | End: 2019-03-27 | Stop reason: SDUPTHER

## 2018-03-20 ENCOUNTER — TELEPHONE ANTICOAG (OUTPATIENT)
Dept: CARDIOLOGY CLINIC | Age: 73
End: 2018-03-20

## 2018-03-20 DIAGNOSIS — I48.91 ATRIAL FIBRILLATION, UNSPECIFIED TYPE (HCC): ICD-10-CM

## 2018-03-20 LAB — INR, EXTERNAL: 3.8

## 2018-03-20 NOTE — PATIENT INSTRUCTIONS
Called and left message on patient voicemail to take 5 mg tonight and then resume current dose of coumadin and recheck INR in 1 week on 3/27/18. If any questions call the office.

## 2018-04-03 ENCOUNTER — TELEPHONE ANTICOAG (OUTPATIENT)
Dept: CARDIOLOGY CLINIC | Age: 73
End: 2018-04-03

## 2018-04-03 DIAGNOSIS — I48.91 ATRIAL FIBRILLATION, UNSPECIFIED TYPE (HCC): ICD-10-CM

## 2018-04-03 LAB — INR, EXTERNAL: 2.7

## 2018-04-03 NOTE — PATIENT INSTRUCTIONS
Patient called and message left for patient to continue alternating coumadin 10mg and 7.5mg and repeat INR in 2 weeks. Return call requested for any questions or concerns.

## 2018-04-18 ENCOUNTER — TELEPHONE ANTICOAG (OUTPATIENT)
Dept: CARDIOLOGY CLINIC | Age: 73
End: 2018-04-18

## 2018-04-18 DIAGNOSIS — I48.91 ATRIAL FIBRILLATION, UNSPECIFIED TYPE (HCC): ICD-10-CM

## 2018-04-18 LAB — INR, EXTERNAL: 2.8

## 2018-05-01 ENCOUNTER — TELEPHONE ANTICOAG (OUTPATIENT)
Dept: CARDIOLOGY CLINIC | Age: 73
End: 2018-05-01

## 2018-05-01 DIAGNOSIS — I48.91 ATRIAL FIBRILLATION, UNSPECIFIED TYPE (HCC): ICD-10-CM

## 2018-05-01 LAB — INR, EXTERNAL: 3

## 2018-05-15 ENCOUNTER — TELEPHONE ANTICOAG (OUTPATIENT)
Dept: CARDIOLOGY CLINIC | Age: 73
End: 2018-05-15

## 2018-05-15 LAB — INR, EXTERNAL: 2.3

## 2018-06-05 ENCOUNTER — TELEPHONE ANTICOAG (OUTPATIENT)
Dept: CARDIOLOGY CLINIC | Age: 73
End: 2018-06-05

## 2018-06-05 DIAGNOSIS — I48.91 ATRIAL FIBRILLATION, UNSPECIFIED TYPE (HCC): ICD-10-CM

## 2018-06-05 LAB — INR, EXTERNAL: 2.7

## 2018-06-05 NOTE — PATIENT INSTRUCTIONS
Called and left message for patient to continue current dose of coumadin and recheck INR on 6/26/18. If any questions to call the office.

## 2018-06-06 ENCOUNTER — OFFICE VISIT (OUTPATIENT)
Dept: CARDIOLOGY CLINIC | Age: 73
End: 2018-06-06

## 2018-06-06 VITALS
DIASTOLIC BLOOD PRESSURE: 73 MMHG | SYSTOLIC BLOOD PRESSURE: 127 MMHG | WEIGHT: 157 LBS | HEIGHT: 72 IN | HEART RATE: 83 BPM | BODY MASS INDEX: 21.26 KG/M2

## 2018-06-06 DIAGNOSIS — I34.0 MODERATE MITRAL REGURGITATION: ICD-10-CM

## 2018-06-06 DIAGNOSIS — E78.5 DYSLIPIDEMIA: ICD-10-CM

## 2018-06-06 DIAGNOSIS — I48.19 PERSISTENT ATRIAL FIBRILLATION (HCC): Primary | ICD-10-CM

## 2018-06-06 DIAGNOSIS — I11.9 BENIGN HYPERTENSIVE HEART DISEASE WITHOUT HEART FAILURE: ICD-10-CM

## 2018-06-06 NOTE — PATIENT INSTRUCTIONS
High Blood Pressure: Care Instructions  Your Care Instructions    If your blood pressure is usually above 140/90, you have high blood pressure, or hypertension. That means the top number is 140 or higher or the bottom number is 90 or higher, or both. Despite what a lot of people think, high blood pressure usually doesn't cause headaches or make you feel dizzy or lightheaded. It usually has no symptoms. But it does increase your risk for heart attack, stroke, and kidney or eye damage. The higher your blood pressure, the more your risk increases. Your doctor will give you a goal for your blood pressure. Your goal will be based on your health and your age. An example of a goal is to keep your blood pressure below 140/90. Lifestyle changes, such as eating healthy and being active, are always important to help lower blood pressure. You might also take medicine to reach your blood pressure goal.  Follow-up care is a key part of your treatment and safety. Be sure to make and go to all appointments, and call your doctor if you are having problems. It's also a good idea to know your test results and keep a list of the medicines you take. How can you care for yourself at home? Medical treatment  · If you stop taking your medicine, your blood pressure will go back up. You may take one or more types of medicine to lower your blood pressure. Be safe with medicines. Take your medicine exactly as prescribed. Call your doctor if you think you are having a problem with your medicine. · Talk to your doctor before you start taking aspirin every day. Aspirin can help certain people lower their risk of a heart attack or stroke. But taking aspirin isn't right for everyone, because it can cause serious bleeding. · See your doctor regularly. You may need to see the doctor more often at first or until your blood pressure comes down.   · If you are taking blood pressure medicine, talk to your doctor before you take decongestants or anti-inflammatory medicine, such as ibuprofen. Some of these medicines can raise blood pressure. · Learn how to check your blood pressure at home. Lifestyle changes  · Stay at a healthy weight. This is especially important if you put on weight around the waist. Losing even 10 pounds can help you lower your blood pressure. · If your doctor recommends it, get more exercise. Walking is a good choice. Bit by bit, increase the amount you walk every day. Try for at least 30 minutes on most days of the week. You also may want to swim, bike, or do other activities. · Avoid or limit alcohol. Talk to your doctor about whether you can drink any alcohol. · Try to limit how much sodium you eat to less than 2,300 milligrams (mg) a day. Your doctor may ask you to try to eat less than 1,500 mg a day. · Eat plenty of fruits (such as bananas and oranges), vegetables, legumes, whole grains, and low-fat dairy products. · Lower the amount of saturated fat in your diet. Saturated fat is found in animal products such as milk, cheese, and meat. Limiting these foods may help you lose weight and also lower your risk for heart disease. · Do not smoke. Smoking increases your risk for heart attack and stroke. If you need help quitting, talk to your doctor about stop-smoking programs and medicines. These can increase your chances of quitting for good. When should you call for help? Call 911 anytime you think you may need emergency care. This may mean having symptoms that suggest that your blood pressure is causing a serious heart or blood vessel problem. Your blood pressure may be over 180/110. ? For example, call 911 if:  ? · You have symptoms of a heart attack. These may include:  ¨ Chest pain or pressure, or a strange feeling in the chest.  ¨ Sweating. ¨ Shortness of breath. ¨ Nausea or vomiting.   ¨ Pain, pressure, or a strange feeling in the back, neck, jaw, or upper belly or in one or both shoulders or arms.  ¨ Lightheadedness or sudden weakness. ¨ A fast or irregular heartbeat. ? · You have symptoms of a stroke. These may include:  ¨ Sudden numbness, tingling, weakness, or loss of movement in your face, arm, or leg, especially on only one side of your body. ¨ Sudden vision changes. ¨ Sudden trouble speaking. ¨ Sudden confusion or trouble understanding simple statements. ¨ Sudden problems with walking or balance. ¨ A sudden, severe headache that is different from past headaches. ? · You have severe back or belly pain. ?Do not wait until your blood pressure comes down on its own. Get help right away. ?Call your doctor now or seek immediate care if:  ? · Your blood pressure is much higher than normal (such as 180/110 or higher), but you don't have symptoms. ? · You think high blood pressure is causing symptoms, such as:  ¨ Severe headache. ¨ Blurry vision. ? Watch closely for changes in your health, and be sure to contact your doctor if:  ? · Your blood pressure measures 140/90 or higher at least 2 times. That means the top number is 140 or higher or the bottom number is 90 or higher, or both. ? · You think you may be having side effects from your blood pressure medicine. ? · Your blood pressure is usually normal, but it goes above normal at least 2 times. Where can you learn more? Go to http://rayray-bonifacio.info/. Enter R753 in the search box to learn more about \"High Blood Pressure: Care Instructions. \"  Current as of: September 21, 2016  Content Version: 11.4  © 7739-0351 [employer]. Care instructions adapted under license by [employer] (which disclaims liability or warranty for this information). If you have questions about a medical condition or this instruction, always ask your healthcare professional. [place] [address] any warranty or liability for your use of this information.

## 2018-06-06 NOTE — MR AVS SNAPSHOT
[address] [facility] 
 
 
 [address] [facility] 
[phone] Patient: Gina Cross MRN: [id number] TCA:6/8/1958 Visit Information Date & Time Provider Department Dept. Phone Encounter #  
 6/6/2018 10:30 AM Alejandro George MD [facility] [place] [phone] Follow-up Instructions Return in about 6 months (around 12/6/2018). Your Appointments 12/12/2018 10:30 AM  
Office Visit with Alejandro George MD  
[facility] [place] ([address]) Appt Note: 6 month follow up  
 [address] [place] [address]  
  
   
 [address] [id number] [address] [address]  
  
    
  
 6/26/2018 11:00 AM  
Any with Chelsey Menard MD  
[facility] ([address] [facility]) Appt Note: 1 yr fu; wife is in the hospital , pt will call back to r/s  
 [address] [facility] [address] [id number] [address] [facility]  
  
   
 [address] [facility] [address] [facility],[address] [facility] Upcoming Health Maintenance Date Due Hepatitis C Screening 1945 FOOT EXAM Q1 7/1/1957 EYE EXAM RETINAL OR DILATED Q1 7/1/1957 DTaP/Tdap/Td series (1 - Tdap) 7/1/1968 FOBT Q 1 YEAR AGE 50-75 7/1/1997 ZOSTER VACCINE AGE 60> 5/1/2007 GLAUCOMA SCREENING Q2Y 7/1/2012 Pneumococcal 65+ Low/Medium Risk (1 of 2 - PCV13) 7/1/2012 HEMOGLOBIN A1C Q6M 5/18/2016 MICROALBUMIN Q1 11/18/2016 LIPID PANEL Q1 11/18/2016 MEDICARE YEARLY EXAM 3/14/2018 Influenza Age 5 to Adult 8/1/2018 Allergies as of 6/6/2018  Review Complete On: 12/6/2017 By: Alejandra Vasquez Severity Noted Reaction Type Reactions Iodinated Contrast- Oral And Iv Dye    Unknown (comments) States no allergy to iodine Other Medication    Unknown (comments) Anesthesia (nausea) Current Immunizations  Never Reviewed No immunizations on file. Not reviewed this visit Vitals BP Pulse Height(growth percentile) Weight(growth percentile) BMI Smoking Status 127/73 83 6' (1.829 m) 157 lb (71.2 kg) 21.29 kg/m2 Former Smoker Vitals History BMI and BSA Data Body Mass Index Body Surface Area  
 21.29 kg/m 2 1.9 m 2 Preferred Pharmacy Pharmacy Name Phone  
 [address] [place] [address] [phone] Your Updated Medication List  
  
   
This list is accurate as of 6/6/18 11:05 AM.  Always use your most recent med list.  
  
  
  
  
 cholecalciferol (VITAMIN D3) 5,000 unit Tab tablet Commonly known as:  VITAMIN D3 Take 2,000 Units by mouth. digoxin 0.125 mg tablet Commonly known as:  [address] [address] Take 1 Tab by mouth daily. dutasteride 0.5 mg capsule Commonly known as:  AVODART Take 1 capsule twice a week HumaLOG Mix 50-50 Insuln U-100 100 unit/mL (50-50) flexpen Generic drug:  insulin lispro protamine/insulin lispro  
by SubCUTAneous route two (2) times a day. 22 units Q AM, 10 units QPM  
  
 ipratropium 42 mcg (0.06 %) nasal spray Commonly known as:  ATROVENT  
2 Sprays four (4) times daily. levothyroxine 125 mcg tablet Commonly known as:  SYNTHROID Take 100 mcg by mouth Daily (before breakfast). lisinopril 5 mg tablet Commonly known as:  PRINIVIL, ZESTRIL  
1 tablet by mouth daily, Hold medication if systolic less than 229 or diastolic less than 60  
  
 metoprolol succinate 25 mg XL tablet Commonly known as:  TOPROL-XL Take 1 Tab by mouth daily. warfarin 5 mg tablet Commonly known as:  COUMADIN Alternate 10mg and 7.5mg every evening or as directed by physician. Follow-up Instructions Return in about 6 months (around 12/6/2018). Patient Instructions High Blood Pressure: Care Instructions Your Care Instructions If your blood pressure is usually above 140/90, you have high blood pressure, or hypertension. That means the top number is 140 or higher or the bottom number is 90 or higher, or both. Despite what a lot of people think, high blood pressure usually doesn't cause headaches or make you feel dizzy or lightheaded. It usually has no symptoms. But it does increase your risk for heart attack, stroke, and kidney or eye damage. The higher your blood pressure, the more your risk increases. Your doctor will give you a goal for your blood pressure. Your goal will be based on your health and your age. An example of a goal is to keep your blood pressure below 140/90. Lifestyle changes, such as eating healthy and being active, are always important to help lower blood pressure. You might also take medicine to reach your blood pressure goal. 
Follow-up care is a key part of your treatment and safety. Be sure to make and go to all appointments, and call your doctor if you are having problems. It's also a good idea to know your test results and keep a list of the medicines you take. How can you care for yourself at home? Medical treatment · If you stop taking your medicine, your blood pressure will go back up. You may take one or more types of medicine to lower your blood pressure. Be safe with medicines. Take your medicine exactly as prescribed. Call your doctor if you think you are having a problem with your medicine. · Talk to your doctor before you start taking aspirin every day. Aspirin can help certain people lower their risk of a heart attack or stroke. But taking aspirin isn't right for everyone, because it can cause serious bleeding. · See your doctor regularly. You may need to see the doctor more often at first or until your blood pressure comes down. · If you are taking blood pressure medicine, talk to your doctor before you take decongestants or anti-inflammatory medicine, such as ibuprofen. Some of these medicines can raise blood pressure. · Learn how to check your blood pressure at home. Lifestyle changes · Stay at a healthy weight. This is especially important if you put on weight around the waist. Losing even 10 pounds can help you lower your blood pressure. · If your doctor recommends it, get more exercise. Walking is a good choice. Bit by bit, increase the amount you walk every day. Try for at least 30 minutes on most days of the week. You also may want to swim, bike, or do other activities. · Avoid or limit alcohol. Talk to your doctor about whether you can drink any alcohol. · Try to limit how much sodium you eat to less than 2,300 milligrams (mg) a day. Your doctor may ask you to try to eat less than 1,500 mg a day. · Eat plenty of fruits (such as bananas and oranges), vegetables, legumes, whole grains, and low-fat dairy products. · Lower the amount of saturated fat in your diet. Saturated fat is found in animal products such as milk, cheese, and meat. Limiting these foods may help you lose weight and also lower your risk for heart disease. · Do not smoke. Smoking increases your risk for heart attack and stroke. If you need help quitting, talk to your doctor about stop-smoking programs and medicines. These can increase your chances of quitting for good. When should you call for help? Call 911 anytime you think you may need emergency care. This may mean having symptoms that suggest that your blood pressure is causing a serious heart or blood vessel problem. Your blood pressure may be over 180/110. ? For example, call 911 if: 
? · You have symptoms of a heart attack. These may include: ¨ Chest pain or pressure, or a strange feeling in the chest. 
¨ Sweating. ¨ Shortness of breath. ¨ Nausea or vomiting. ¨ Pain, pressure, or a strange feeling in the back, neck, jaw, or upper belly or in one or both shoulders or arms. ¨ Lightheadedness or sudden weakness. ¨ A fast or irregular heartbeat. ? · You have symptoms of a stroke. These may include: 
¨ Sudden numbness, tingling, weakness, or loss of movement in your face, arm, or leg, especially on only one side of your body. ¨ Sudden vision changes. ¨ Sudden trouble speaking. ¨ Sudden confusion or trouble understanding simple statements. ¨ Sudden problems with walking or balance. ¨ A sudden, severe headache that is different from past headaches. ? · You have severe back or belly pain. ?Do not wait until your blood pressure comes down on its own. Get help right away. ?Call your doctor now or seek immediate care if: 
? · Your blood pressure is much higher than normal (such as 180/110 or higher), but you don't have symptoms. ? · You think high blood pressure is causing symptoms, such as: ¨ Severe headache. ¨ Blurry vision. ? Watch closely for changes in your health, and be sure to contact your doctor if: 
? · Your blood pressure measures 140/90 or higher at least 2 times. That means the top number is 140 or higher or the bottom number is 90 or higher, or both. ? · You think you may be having side effects from your blood pressure medicine. ? · Your blood pressure is usually normal, but it goes above normal at least 2 times. Where can you learn more? Go to http://rayray-bonifacio.info/. Enter J461 in the search box to learn more about \"High Blood Pressure: Care Instructions. \" Current as of: September 21, 2016 Content Version: 11.4 © 0677-4336 [employer]. Care instructions adapted under license by [employer] (which disclaims liability or warranty for this information). If you have questions about a medical condition or this instruction, always ask your healthcare professional. [place] [address] any warranty or liability for your use of this information. Introducing [facility]!    
 [employer] introduces The [employer] patient portal. Now you can access parts of your medical record, email your doctor's office, and request medication refills online. 1. In your internet browser, go to [email or web address] 2. Click on the First Time User? Click Here link in the Sign In box. You will see the New Member Sign Up page. 3. Enter your [employer] Access Code exactly as it appears below. You will not need to use this code after youve completed the sign-up process. If you do not sign up before the expiration date, you must request a new code. · [employer] Access Code: [email or web address] Expires: 9/3/2018  1:12 PM 
 
4. Enter the last four digits of your Social Security Number (xxxx) and Date of Birth (mm/dd/yyyy) as indicated and click Submit. You will be taken to the next sign-up page. 5. Create a [employer] ID. This will be your [employer] login ID and cannot be changed, so think of one that is secure and easy to remember. 6. Create a [employer] password. You can change your password at any time. 7. Enter your Password Reset Question and Answer. This can be used at a later time if you forget your password. 8. Enter your e-mail address. You will receive e-mail notification when new information is available in [address] 9. Click Sign Up. You can now view and download portions of your medical record. 10. Click the Download Summary menu link to download a portable copy of your medical information. If you have questions, please visit the Frequently Asked Questions section of the [employer] website. Remember, [employer] is NOT to be used for urgent needs. For medical emergencies, dial 911. Now available from your iPhone and Android! Please provide this summary of care documentation to your next provider. Your primary care clinician is listed as Nina Shen. If you have any questions after today's visit, please call [phone].

## 2018-06-06 NOTE — PROGRESS NOTES
HISTORY OF PRESENT ILLNESS  Josse Wick is a 81 y.o. male. Palpitations    The history is provided by the patient. The current episode started more than 1 week ago. The problem has been rapidly improving. The problem occurs rarely. Associated symptoms include shortness of breath. Pertinent negatives include no diaphoresis, no fever, no claudication, no near-syncope, no orthopnea, no PND, no syncope, no nausea, no vomiting, no leg pain, no dizziness, no weakness, no cough, no hemoptysis and no sputum production. Risk factors include dyslipidemia, hypertension and male gender. His past medical history is significant for hypertension and atrial fibrillation. His past medical history does not include hyperthyroidism. Shortness of Breath   The history is provided by the patient. This is a chronic problem. The problem occurs intermittently. The current episode started more than 1 week ago. The problem has not changed since onset. Pertinent negatives include no fever, no ear pain, no neck pain, no cough, no sputum production, no hemoptysis, no wheezing, no PND, no orthopnea, no syncope, no vomiting, no rash, no leg pain, no leg swelling and no claudication. He has had prior hospitalizations. Associated medical issues do not include chronic lung disease, CAD or heart failure. Hypertension   The history is provided by the patient. This is a chronic problem. The current episode started more than 1 week ago. The problem occurs every several days. The problem has not changed since onset. Associated symptoms include shortness of breath. Review of Systems   Constitutional: Negative for chills, diaphoresis, fever and weight loss. HENT: Negative for ear pain and hearing loss. Eyes: Negative for blurred vision. Respiratory: Positive for shortness of breath. Negative for cough, hemoptysis, sputum production, wheezing and stridor. Cardiovascular: Positive for palpitations.  Negative for orthopnea, claudication, leg swelling, syncope, PND and near-syncope. Gastrointestinal: Negative for heartburn, nausea and vomiting. Musculoskeletal: Negative for myalgias and neck pain. Skin: Negative for rash. Neurological: Negative for dizziness, tingling, tremors, focal weakness, loss of consciousness and weakness. Psychiatric/Behavioral: Negative for depression and suicidal ideas. Family History   Problem Relation Age of Onset    Hypertension Mother     Heart Attack Father     Pacemaker Father        Past Medical History:   Diagnosis Date    AF (atrial fibrillation) ([facility] 9/3/09    Atrial flutter (HCC)     BPH (benign prostatic hypertrophy) 9/3/09    Carotid duplex 09/14/05    No stenosis bilaterally     Diabetes mellitus, type 2 ([facility]     Echocardiogram 10/27/2011    EF 60-65%. Gr 1 DDfx. RVSP 30 mmHg. Mild LAE. Mild MR. Diastolic doming of mitral valve. Mild IVCE.     Elevated PSA     Hypercholesterolemia     dislipidemia    Hypertension     Hypothyroidism     on replacement therapy    Long term (current) use of anticoagulants 3/16/2011    Lower extremity arterial duplex 03/16/09    No evidence of arterial insufficiency bilaterally at rest    Paroxysmal atrial fibrillation (HCC)     Personal history of urinary calculi 9/3/09    Stress thallium 08/07/08    Very small scarring in basal inferior wall w/min ischemia likely artifact; EF 68%; rapid AF during exercise; EKG portion negative which was terminated prematurely because of the persistent rapid AF       Past Surgical History:   Procedure Laterality Date    ABDOMEN SURGERY PROC UNLISTED  1991    Colon surgical repair following puncture during removal of polyps    HX HERNIA REPAIR      1974, left; 1997, right    HX LITHOTRIPSY  10/2004    Removed kidney stones    HX OTHER SURGICAL  7/2015    Scar tissue removed from bowels    HX TONSILLECTOMY      age 12       Social History   Substance Use Topics    Smoking status: Former Smoker Packs/day: 1.50     Years: 35.00     Quit date: 10/11/1980    Smokeless tobacco: Never Used    Alcohol use No       Allergies   Allergen Reactions    Iodinated Contrast- Oral And Iv Dye Unknown (comments)     States no allergy to iodine    Other Medication Unknown (comments)     Anesthesia (nausea)       Outpatient Prescriptions Marked as Taking for the 6/6/18 encounter (Office Visit) with Marco Barrett MD   Medication Sig Dispense Refill    digoxin (DIGOX) 0.125 mg tablet Take 1 Tab by mouth daily. 90 Tab 3    warfarin (COUMADIN) 5 mg tablet Alternate 10mg and 7.5mg every evening or as directed by physician. 55 Tab 3    metoprolol succinate (TOPROL-XL) 25 mg XL tablet Take 1 Tab by mouth daily. (Patient taking differently: Take 25 mg by mouth two (2) times a day.) 90 Tab 3    dutasteride (AVODART) 0.5 mg capsule Take 1 capsule twice a week 30 Cap 3    lisinopril (PRINIVIL, ZESTRIL) 5 mg tablet 1 tablet by mouth daily, Hold medication if systolic less than 762 or diastolic less than 60 90 Tab 3    levothyroxine (SYNTHROID) 125 mcg tablet Take 100 mcg by mouth Daily (before breakfast).  Cholecalciferol, Vitamin D3, 5,000 unit Tab Take 2,000 Units by mouth.  lispro-protamine & lispro (HUMALOG MIX 50-50) 100 unit/mL (50-50) flexpen by SubCUTAneous route two (2) times a day. 22 units Q AM, 10 units QPM           Visit Vitals    /73    Pulse 83    Ht 6' (1.829 m)    Wt 71.2 kg (157 lb)    BMI 21.29 kg/m2     Physical Exam   Constitutional: He is oriented to person, place, and time. He appears well-developed and well-nourished. No distress. HENT:   Head: Atraumatic. Mouth/Throat: No oropharyngeal exudate. Eyes: Conjunctivae are normal. No scleral icterus. Neck: Neck supple. No JVD present. Cardiovascular: Normal rate and regular rhythm. Exam reveals no gallop. Murmur (3/6 holosystolic murmur best heard at mitral area with no radiation) heard.   Pulmonary/Chest: Effort normal and breath sounds normal. No stridor. He has no wheezes. He has no rales. Abdominal: Soft. There is no tenderness. There is no rebound. Musculoskeletal: Normal range of motion. He exhibits no edema or tenderness. Neurological: He is alert and oriented to person, place, and time. He exhibits normal muscle tone. Skin: Skin is warm. He is not diaphoretic. Psychiatric: He has a normal mood and affect. His behavior is normal.     ekg atrial fibrillaton with controlled ventricular response. Echo 05/2017:  SUMMARY:  Left ventricle: Systolic function was normal. Ejection fraction was  estimated in the range of 55 % to 60 %. There were no regional wall motion  abnormalities. Doppler parameters were consistent with restrictive  physiology, indicative of decreased left ventricular diastolic compliance  and/or increased left atrial pressure. Left atrium: The atrium was severely dilated. LA volume index was 61.72  ml/mï¾². Right atrium: The atrium was dilated. Mitral valve: There was mild annular calcification. There was mild to  moderate regurgitation. Tricuspid valve: There was mild to moderate regurgitation. Pulmonary  artery systolic pressure: 45 mmHg. There was mild pulmonary hypertension. Inferior vena cava, hepatic veins: The respirophasic change in diameter  was less than 50%. ASSESSMENT and PLAN    ICD-10-CM ICD-9-CM    1. Persistent atrial fibrillation (HCC) I48.1 427.31    2. Moderate mitral regurgitation I34.0 424.0    3. Hypertension I11.9 402.10    4. Dyslipidemia E78.5 272.4      Follow-up Disposition:  Return in about 6 months (around 12/6/2018). Patient on lopressor and digoxin for rate control. Continue coumadin for anticoagulation.   No new symptoms since the last visit- except that he lost his wife 2 months ago and he is recovering from grief  Follow up in 6months

## 2018-06-20 ENCOUNTER — TELEPHONE (OUTPATIENT)
Dept: CARDIOLOGY CLINIC | Age: 73
End: 2018-06-20

## 2018-06-20 NOTE — TELEPHONE ENCOUNTER
Called and left message with [employer] [facility] patient has INR check every 3 weeks per MD. If any questions to call the office.

## 2018-06-26 ENCOUNTER — TELEPHONE ANTICOAG (OUTPATIENT)
Dept: CARDIOLOGY CLINIC | Age: 73
End: 2018-06-26

## 2018-06-26 LAB — INR, EXTERNAL: 2.2

## 2018-07-13 ENCOUNTER — TELEPHONE (OUTPATIENT)
Dept: CARDIOLOGY CLINIC | Age: 73
End: 2018-07-13

## 2018-07-13 NOTE — TELEPHONE ENCOUNTER
Called and spoke with [place] from [facility] and Per [place]  patient is schedule to have INR 1 to 4 times a week. Verbal order was given per Dr. Emiliana Mcclure if INR is stable.

## 2018-07-13 NOTE — TELEPHONE ENCOUNTER
Called and spoke to Gisela Hemphill from [facility] regarding the policy for how long a patient INR can go out. According to their policy either one week or two weeks. Patient is now having INR checked every 3 weeks. Please advise if you want to change back to 2 weeks or continue every three weeks.

## 2018-07-13 NOTE — TELEPHONE ENCOUNTER
----- Message from Anamaria Babb sent at 7/13/2018 12:29 PM EDT -----  Regarding: PT/INR  Contact: [phone]  PER MS ARNOLDE TO RETURN CALL CONCERNING PT/INR, NEEDS A VERBAL AUTH FOR TESTING FREQUENCY ([phone]) [facility].

## 2018-07-17 ENCOUNTER — TELEPHONE ANTICOAG (OUTPATIENT)
Dept: CARDIOLOGY CLINIC | Age: 73
End: 2018-07-17

## 2018-07-17 LAB — INR, EXTERNAL: 2.8

## 2018-07-23 DIAGNOSIS — I48.92 ATRIAL FLUTTER, UNSPECIFIED TYPE (HCC): ICD-10-CM

## 2018-07-23 RX ORDER — WARFARIN SODIUM 5 MG/1
TABLET ORAL
Qty: 90 TAB | Refills: 3 | Status: SHIPPED | OUTPATIENT
Start: 2018-07-23 | End: 2018-07-23 | Stop reason: SDUPTHER

## 2018-07-24 RX ORDER — WARFARIN SODIUM 5 MG/1
TABLET ORAL
Qty: 120 TAB | Refills: 3 | Status: SHIPPED | OUTPATIENT
Start: 2018-07-24 | End: 2018-12-12 | Stop reason: SDUPTHER

## 2018-08-07 ENCOUNTER — TELEPHONE ANTICOAG (OUTPATIENT)
Dept: CARDIOLOGY CLINIC | Age: 73
End: 2018-08-07

## 2018-08-07 DIAGNOSIS — I48.20 CHRONIC ATRIAL FIBRILLATION (HCC): ICD-10-CM

## 2018-08-07 LAB — INR, EXTERNAL: 2.6

## 2018-08-07 NOTE — PATIENT INSTRUCTIONS
Called and spoke to patient regarding coumadin instructed patient to continue current dose and recheck In 3 weeks. He voices understanding and acceptance of this advice and will call back if any further questions or concerns.

## 2018-08-28 ENCOUNTER — TELEPHONE ANTICOAG (OUTPATIENT)
Dept: CARDIOLOGY CLINIC | Age: 73
End: 2018-08-28

## 2018-08-28 LAB — INR, EXTERNAL: 3

## 2018-09-18 ENCOUNTER — TELEPHONE ANTICOAG (OUTPATIENT)
Dept: CARDIOLOGY CLINIC | Age: 73
End: 2018-09-18

## 2018-09-18 DIAGNOSIS — I48.20 CHRONIC ATRIAL FIBRILLATION (HCC): ICD-10-CM

## 2018-09-18 LAB — INR, EXTERNAL: 2.6

## 2018-10-09 ENCOUNTER — TELEPHONE ANTICOAG (OUTPATIENT)
Dept: CARDIOLOGY CLINIC | Age: 73
End: 2018-10-09

## 2018-10-09 LAB — INR, EXTERNAL: 2.9

## 2018-10-30 ENCOUNTER — TELEPHONE ANTICOAG (OUTPATIENT)
Dept: CARDIOLOGY CLINIC | Age: 73
End: 2018-10-30

## 2018-10-30 LAB — INR, EXTERNAL: 2.9

## 2018-11-20 ENCOUNTER — TELEPHONE ANTICOAG (OUTPATIENT)
Dept: CARDIOLOGY CLINIC | Age: 73
End: 2018-11-20

## 2018-11-20 LAB — INR, EXTERNAL: 3.2

## 2018-11-20 NOTE — PROGRESS NOTES
Patient called in concern to coumadin dosing information from the NP. Patient is to take 5 mg today, 10 mg on Wednesday, Friday, and Sunday, all other days to take 7.5 mg. INR recheck on Tuesday 11/27/18. Patient verbalized understanding with teach back of above information. Reminded to call the office if any questions or concerns.

## 2018-11-27 ENCOUNTER — TELEPHONE ANTICOAG (OUTPATIENT)
Dept: CARDIOLOGY CLINIC | Age: 73
End: 2018-11-27

## 2018-11-27 LAB — INR, EXTERNAL: 2.1

## 2018-12-04 ENCOUNTER — TELEPHONE ANTICOAG (OUTPATIENT)
Dept: CARDIOLOGY CLINIC | Age: 73
End: 2018-12-04

## 2018-12-04 DIAGNOSIS — I48.91 ATRIAL FIBRILLATION, UNSPECIFIED TYPE (HCC): ICD-10-CM

## 2018-12-04 LAB — INR, EXTERNAL: 2.6

## 2018-12-04 NOTE — PATIENT INSTRUCTIONS
Patient was called and given instruction to resume current dose of coumadin and recheck INR in 1 week. He voices understanding and acceptance of this advice and will call back if any further questions or concerns.

## 2018-12-11 ENCOUNTER — TELEPHONE ANTICOAG (OUTPATIENT)
Dept: CARDIOLOGY CLINIC | Age: 73
End: 2018-12-11

## 2018-12-11 DIAGNOSIS — I48.91 ATRIAL FIBRILLATION, UNSPECIFIED TYPE (HCC): ICD-10-CM

## 2018-12-11 LAB — INR, EXTERNAL: 2.9

## 2018-12-11 NOTE — PROGRESS NOTES
Spoken with patient in concern to coumadin dosing. Per NP patient it to take 10 mg today Thursday, Saturday, and Monday, all other days to take 7.5 mg , with an INR recheck on Tuesday December 18, 2018 . Patient verbalized understanding with teach back. Patient reminded if any questions or concerns to call the office.

## 2018-12-12 ENCOUNTER — OFFICE VISIT (OUTPATIENT)
Dept: CARDIOLOGY CLINIC | Age: 73
End: 2018-12-12

## 2018-12-12 VITALS
DIASTOLIC BLOOD PRESSURE: 72 MMHG | BODY MASS INDEX: 23.64 KG/M2 | SYSTOLIC BLOOD PRESSURE: 127 MMHG | WEIGHT: 156 LBS | HEIGHT: 68 IN | HEART RATE: 92 BPM

## 2018-12-12 DIAGNOSIS — I48.20 CHRONIC ATRIAL FIBRILLATION (HCC): Primary | ICD-10-CM

## 2018-12-12 DIAGNOSIS — E78.5 DYSLIPIDEMIA: ICD-10-CM

## 2018-12-12 DIAGNOSIS — E03.4 HYPOTHYROIDISM DUE TO ACQUIRED ATROPHY OF THYROID: ICD-10-CM

## 2018-12-12 DIAGNOSIS — I48.92 ATRIAL FLUTTER, UNSPECIFIED TYPE (HCC): ICD-10-CM

## 2018-12-12 DIAGNOSIS — I11.9 BENIGN HYPERTENSIVE HEART DISEASE WITHOUT HEART FAILURE: ICD-10-CM

## 2018-12-12 DIAGNOSIS — I34.0 MODERATE MITRAL REGURGITATION: ICD-10-CM

## 2018-12-12 RX ORDER — WARFARIN SODIUM 5 MG/1
TABLET ORAL
Qty: 120 TAB | Refills: 0 | Status: SHIPPED | OUTPATIENT
Start: 2018-12-12 | End: 2022-06-09 | Stop reason: SDUPTHER

## 2018-12-12 RX ORDER — WARFARIN SODIUM 5 MG/1
TABLET ORAL
Qty: 240 TAB | Refills: 3 | Status: SHIPPED | OUTPATIENT
Start: 2018-12-12 | End: 2018-12-12

## 2018-12-12 NOTE — PROGRESS NOTES
HISTORY OF PRESENT ILLNESS  Delilah Miles is a 72 y.o. male. Palpitations    The history is provided by the patient. The current episode started more than 1 week ago. The problem has been rapidly improving. The problem occurs rarely. Associated symptoms include shortness of breath. Pertinent negatives include no diaphoresis, no fever, no claudication, no near-syncope, no orthopnea, no PND, no syncope, no nausea, no vomiting, no leg pain, no dizziness, no weakness, no cough, no hemoptysis and no sputum production. Risk factors include dyslipidemia, hypertension and male gender. His past medical history is significant for hypertension and atrial fibrillation. His past medical history does not include hyperthyroidism. Shortness of Breath   The history is provided by the patient. This is a chronic problem. The problem occurs intermittently. The current episode started more than 1 week ago. The problem has not changed since onset. Pertinent negatives include no fever, no ear pain, no neck pain, no cough, no sputum production, no hemoptysis, no wheezing, no PND, no orthopnea, no syncope, no vomiting, no rash, no leg pain, no leg swelling and no claudication. He has had prior hospitalizations. Associated medical issues do not include chronic lung disease, CAD or heart failure. Hypertension   The history is provided by the patient. This is a chronic problem. The current episode started more than 1 week ago. The problem occurs every several days. The problem has not changed since onset. Associated symptoms include shortness of breath. Review of Systems   Constitutional: Negative for chills, diaphoresis, fever and weight loss. HENT: Negative for ear pain and hearing loss. Eyes: Negative for blurred vision. Respiratory: Positive for shortness of breath. Negative for cough, hemoptysis, sputum production, wheezing and stridor. Cardiovascular: Positive for palpitations.  Negative for orthopnea, claudication, leg swelling, syncope, PND and near-syncope. Gastrointestinal: Negative for heartburn, nausea and vomiting. Musculoskeletal: Negative for myalgias and neck pain. Skin: Negative for rash. Neurological: Negative for dizziness, tingling, tremors, focal weakness, loss of consciousness and weakness. Psychiatric/Behavioral: Negative for depression and suicidal ideas. Family History   Problem Relation Age of Onset    Hypertension Mother     Heart Attack Father     Pacemaker Father        Past Medical History:   Diagnosis Date    AF (atrial fibrillation) ([facility] 9/3/09    Atrial flutter (HCC)     BPH (benign prostatic hypertrophy) 9/3/09    Carotid duplex 09/14/05    No stenosis bilaterally     Diabetes mellitus, type 2 ([facility]     Echocardiogram 10/27/2011    EF 60-65%. Gr 1 DDfx. RVSP 30 mmHg. Mild LAE. Mild MR. Diastolic doming of mitral valve. Mild IVCE.     Elevated PSA     Hypercholesterolemia     dislipidemia    Hypertension     Hypothyroidism     on replacement therapy    Long term (current) use of anticoagulants 3/16/2011    Lower extremity arterial duplex 03/16/09    No evidence of arterial insufficiency bilaterally at rest    Paroxysmal atrial fibrillation (HCC)     Personal history of urinary calculi 9/3/09    Stress thallium 08/07/08    Very small scarring in basal inferior wall w/min ischemia likely artifact; EF 68%; rapid AF during exercise; EKG portion negative which was terminated prematurely because of the persistent rapid AF       Past Surgical History:   Procedure Laterality Date    ABDOMEN SURGERY PROC UNLISTED  1991    Colon surgical repair following puncture during removal of polyps    HX HERNIA REPAIR      1974, left; 1997, right    HX LITHOTRIPSY  10/2004    Removed kidney stones    HX OTHER SURGICAL  7/2015    Scar tissue removed from bowels    HX TONSILLECTOMY      age 12       Social History     Tobacco Use    Smoking status: Former Smoker     Packs/day: 1.50 Years: 35.00     Pack years: 52.50     Last attempt to quit: 10/11/1980     Years since quittin.1    Smokeless tobacco: Never Used   Substance Use Topics    Alcohol use: No       Allergies   Allergen Reactions    Iodinated Contrast- Oral And Iv Dye Unknown (comments)     States no allergy to iodine    Other Medication Unknown (comments)     Anesthesia (nausea)       Outpatient Medications Marked as Taking for the 18 encounter (Office Visit) with Nilsa Spence MD   Medication Sig Dispense Refill    warfarin (COUMADIN) 5 mg tablet Alternate 10mg and 7.5mg every evening or as directed by physician. 240 Tab 3    levothyroxine (SYNTHROID) 112 mcg tablet       HUMALOG MIX 50-50 INSULN U-100 100 unit/mL (50-50) injection       digoxin (DIGOX) 0.125 mg tablet Take 1 Tab by mouth daily. 90 Tab 3    metoprolol succinate (TOPROL-XL) 25 mg XL tablet Take 1 Tab by mouth daily. (Patient taking differently: Take 25 mg by mouth two (2) times a day.) 90 Tab 3    dutasteride (AVODART) 0.5 mg capsule Take 1 capsule twice a week 30 Cap 3    lispro-protamine & lispro (HUMALOG MIX 50-50) 100 unit/mL (50-50) flexpen by SubCUTAneous route two (2) times a day. 22 units Q AM, 10 units QPM           Visit Vitals  /72   Pulse 92   Ht 5' 8\" (1.727 m)   Wt 70.8 kg (156 lb)   BMI 23.72 kg/m²     Physical Exam   Constitutional: He is oriented to person, place, and time. He appears well-developed and well-nourished. No distress. HENT:   Head: Atraumatic. Mouth/Throat: No oropharyngeal exudate. Eyes: Conjunctivae are normal. No scleral icterus. Neck: Neck supple. No JVD present. Cardiovascular: Normal rate and regular rhythm. Exam reveals no gallop. Murmur (3/6 holosystolic murmur best heard at mitral area with no radiation) heard. Pulmonary/Chest: Effort normal and breath sounds normal. No stridor. He has no wheezes. He has no rales. Abdominal: Soft. There is no tenderness.  There is no rebound. Musculoskeletal: Normal range of motion. He exhibits no edema or tenderness. Neurological: He is alert and oriented to person, place, and time. He exhibits normal muscle tone. Skin: Skin is warm. He is not diaphoretic. Psychiatric: He has a normal mood and affect. His behavior is normal.     ekg atrial fibrillaton with controlled ventricular response. Echo 05/2017:  SUMMARY:  Left ventricle: Systolic function was normal. Ejection fraction was  estimated in the range of 55 % to 60 %. There were no regional wall motion  abnormalities. Doppler parameters were consistent with restrictive  physiology, indicative of decreased left ventricular diastolic compliance  and/or increased left atrial pressure. Left atrium: The atrium was severely dilated. LA volume index was 61.72  ml/mï¾². Right atrium: The atrium was dilated. Mitral valve: There was mild annular calcification. There was mild to  moderate regurgitation. Tricuspid valve: There was mild to moderate regurgitation. Pulmonary  artery systolic pressure: 45 mmHg. There was mild pulmonary hypertension. Inferior vena cava, hepatic veins: The respirophasic change in diameter  was less than 50%. ASSESSMENT and PLAN    ICD-10-CM ICD-9-CM    1. Chronic atrial fibrillation (HCC) I48.2 427.31    2. Moderate mitral regurgitation I34.0 424.0 ECHO ADULT COMPLETE   3. Hypertension I11.9 402.10    4. Dyslipidemia E78.5 272.4    5. Hypothyroidism due to acquired atrophy of thyroid E03.4 244.8      246.8    6. Atrial flutter, unspecified type (HCC) I48.92 427.32 warfarin (COUMADIN) 5 mg tablet     Follow-up Disposition:  Return in about 6 months (around 6/12/2019). Patient on lopressor and digoxin for rate control. Continue coumadin for anticoagulation. No new symptoms since the last visit-   Follow up in 6months.

## 2018-12-18 ENCOUNTER — TELEPHONE ANTICOAG (OUTPATIENT)
Dept: CARDIOLOGY CLINIC | Age: 73
End: 2018-12-18

## 2018-12-18 DIAGNOSIS — I48.91 ATRIAL FIBRILLATION, UNSPECIFIED TYPE (HCC): ICD-10-CM

## 2018-12-18 LAB — INR, EXTERNAL: 2.9

## 2018-12-26 ENCOUNTER — TELEPHONE ANTICOAG (OUTPATIENT)
Dept: CARDIOLOGY CLINIC | Age: 73
End: 2018-12-26

## 2018-12-26 DIAGNOSIS — I48.91 ATRIAL FIBRILLATION, UNSPECIFIED TYPE (HCC): ICD-10-CM

## 2018-12-26 LAB — INR, EXTERNAL: 3.2

## 2019-01-01 LAB — INR, EXTERNAL: 2.6

## 2019-01-02 ENCOUNTER — TELEPHONE ANTICOAG (OUTPATIENT)
Dept: CARDIOLOGY CLINIC | Age: 74
End: 2019-01-02

## 2019-01-03 NOTE — PROGRESS NOTES
Spoken with patient in concern to coumadin dosing. Per NP patient it to take  7.5 mg today Saturday and Monday, all other days to take 10 mg , with an INR recheck on Wednesday January 9, 2019  . Patient verbalized understanding with teach back. Patient reminded if any questions or concerns to call the office.

## 2019-01-08 ENCOUNTER — TELEPHONE ANTICOAG (OUTPATIENT)
Dept: CARDIOLOGY CLINIC | Age: 74
End: 2019-01-08

## 2019-01-08 LAB — INR, EXTERNAL: 2.8

## 2019-01-08 NOTE — PATIENT INSTRUCTIONS
Patient was called and left a detail message to resume current dose of coumadin and recheck INR on 1/15/19. If any questions to call office.

## 2019-01-15 ENCOUNTER — TELEPHONE ANTICOAG (OUTPATIENT)
Dept: CARDIOLOGY CLINIC | Age: 74
End: 2019-01-15

## 2019-01-15 LAB — INR, EXTERNAL: 3.1

## 2019-01-15 NOTE — PATIENT INSTRUCTIONS
Called and left a voice message on patient's phone, to continue with his current dosage alternating 7.5mg/10mg. If any questions to please call the office.

## 2019-01-22 ENCOUNTER — TELEPHONE ANTICOAG (OUTPATIENT)
Dept: CARDIOLOGY CLINIC | Age: 74
End: 2019-01-22

## 2019-01-22 LAB — INR, EXTERNAL: 2.4

## 2019-01-22 NOTE — PATIENT INSTRUCTIONS
Patient called in concern to coumadin dosing; no answer, voicemail left with instructions as follows. Per NP patient is to take  mg of coumadin 10 mg, then 7.5mg of coumadin, repeating every two days , with an INR recheck on Tuesday, February 5, 2019 . Patient reminded if any questions or concerns to call the office.

## 2019-01-29 ENCOUNTER — TELEPHONE ANTICOAG (OUTPATIENT)
Dept: CARDIOLOGY CLINIC | Age: 74
End: 2019-01-29

## 2019-01-29 LAB — INR, EXTERNAL: 2.8

## 2019-01-29 NOTE — PROGRESS NOTES
Patient called in concern to coumadin dosing; no answer, voicemail left with instructions as follows. Per NP patient is to take 7.5 mg of coumadin  Today, 10 mg tomorrow; repeating every two days. INR recheck on Tuesday, February 12, 2019 . Patient reminded if any questions or concerns to call the office.

## 2019-02-12 LAB — INR, EXTERNAL: 2.8

## 2019-02-13 ENCOUNTER — TELEPHONE ANTICOAG (OUTPATIENT)
Dept: CARDIOLOGY CLINIC | Age: 74
End: 2019-02-13

## 2019-02-13 NOTE — PATIENT INSTRUCTIONS
Called and spoke to patient to resume current dose of coumadin and recheck INR on March 6, 2019. He voices understanding and acceptance of this advice and will call back if any further questions or concerns.

## 2019-02-19 RX ORDER — METOPROLOL SUCCINATE 25 MG/1
TABLET, EXTENDED RELEASE ORAL
Qty: 180 TAB | Refills: 0 | Status: SHIPPED | OUTPATIENT
Start: 2019-02-19 | End: 2019-05-22 | Stop reason: SDUPTHER

## 2019-03-06 ENCOUNTER — TELEPHONE ANTICOAG (OUTPATIENT)
Dept: CARDIOLOGY CLINIC | Age: 74
End: 2019-03-06

## 2019-03-06 LAB — INR, EXTERNAL: 2.6

## 2019-03-06 NOTE — PROGRESS NOTES
Spoken with patient in concern to coumadin dosing. Per NP patient is to take 7.5 mg today, 10 mg tomorrow; repeating every two days. INR recheck on Wednesday, April 3, 2019 . Patient verbalized understanding with teach back. Patient reminded if any questions or concerns to call the office.

## 2019-03-27 DIAGNOSIS — I48.20 CHRONIC ATRIAL FIBRILLATION (HCC): Primary | ICD-10-CM

## 2019-03-27 DIAGNOSIS — I48.92 ATRIAL FLUTTER, UNSPECIFIED TYPE (HCC): ICD-10-CM

## 2019-03-27 RX ORDER — DIGOXIN 125 UG/1
TABLET ORAL
Qty: 90 TAB | Refills: 0 | Status: SHIPPED | OUTPATIENT
Start: 2019-03-27 | End: 2019-06-19 | Stop reason: SDUPTHER

## 2019-04-03 ENCOUNTER — TELEPHONE ANTICOAG (OUTPATIENT)
Dept: CARDIOLOGY CLINIC | Age: 74
End: 2019-04-03

## 2019-04-03 LAB — INR, EXTERNAL: 2.5

## 2019-04-04 NOTE — PATIENT INSTRUCTIONS
Patient called to review INR results, he will continue taking 10 alt 7.5 ng of  Coumadin and have INR repeated on 4/30/19. He voices understanding and acceptance of this advice and will call back if any further questions or concerns.

## 2019-04-30 ENCOUNTER — TELEPHONE ANTICOAG (OUTPATIENT)
Dept: CARDIOLOGY CLINIC | Age: 74
End: 2019-04-30

## 2019-04-30 DIAGNOSIS — I48.0 PAROXYSMAL ATRIAL FIBRILLATION (HCC): ICD-10-CM

## 2019-04-30 LAB — INR, EXTERNAL: 3.2

## 2019-04-30 NOTE — PATIENT INSTRUCTIONS
Called and left message on patient voicemail to hold coumadin tonight and then resume current dose of coumadin and recheck on 5/7/19. If any questions to call office.

## 2019-05-07 ENCOUNTER — TELEPHONE ANTICOAG (OUTPATIENT)
Dept: CARDIOLOGY CLINIC | Age: 74
End: 2019-05-07

## 2019-05-07 DIAGNOSIS — I48.0 PAROXYSMAL ATRIAL FIBRILLATION (HCC): ICD-10-CM

## 2019-05-07 LAB — INR, EXTERNAL: 2.1

## 2019-05-07 NOTE — PATIENT INSTRUCTIONS
Patient called in concern to coumadin dosing; no answer, voicemail left with instructions as follows. Per NP patient is to take 7.5 mg of coumadin tonight, 10 mg tomorrow; repeating every two days , with an INR recheck on Tuesday, May 21, 2019. Patient reminded if any questions or concerns to call the office.

## 2019-05-21 ENCOUNTER — TELEPHONE ANTICOAG (OUTPATIENT)
Dept: CARDIOLOGY CLINIC | Age: 74
End: 2019-05-21

## 2019-05-21 DIAGNOSIS — I48.0 PAROXYSMAL ATRIAL FIBRILLATION (HCC): ICD-10-CM

## 2019-05-21 LAB — INR, EXTERNAL: 2

## 2019-05-21 NOTE — PROGRESS NOTES
Spoken with patient in concern to coumadin dosing. Per NP [facility] patient is to take 7.5 mg tonight, 10 mg tomorrow evening; repeating every two days , with an INR recheck on Tuesday, June 4, 2019 . Patient verbalized understanding with teach back. Patient reminded if any questions or concerns to call the office.

## 2019-05-21 NOTE — PATIENT INSTRUCTIONS
Spoken with patient in concern to coumadin dosing. Per NP [facility] patient is to take 7.5 mg tonight, 10 mg tomorrow evening; repeating every two days , with an INR recheck on Tuesday, June 4, 2019 . Patient verbalized understanding with teach back. Patient reminded if any questions or concerns to call the office.

## 2019-06-04 ENCOUNTER — TELEPHONE ANTICOAG (OUTPATIENT)
Dept: CARDIOLOGY CLINIC | Age: 74
End: 2019-06-04

## 2019-06-04 DIAGNOSIS — I48.91 ATRIAL FIBRILLATION, UNSPECIFIED TYPE (HCC): ICD-10-CM

## 2019-06-04 LAB — INR, EXTERNAL: 2.7

## 2019-06-04 NOTE — PATIENT INSTRUCTIONS
Called and spoke to patient to resume current dose of coumadin and recheck INR on 6/25/19. He voices understanding and acceptance of this advice and will call back if any further questions or concerns.

## 2019-06-19 ENCOUNTER — OFFICE VISIT (OUTPATIENT)
Dept: CARDIOLOGY CLINIC | Age: 74
End: 2019-06-19

## 2019-06-19 VITALS
BODY MASS INDEX: 23.11 KG/M2 | SYSTOLIC BLOOD PRESSURE: 119 MMHG | WEIGHT: 156 LBS | DIASTOLIC BLOOD PRESSURE: 80 MMHG | HEART RATE: 66 BPM | HEIGHT: 69 IN

## 2019-06-19 DIAGNOSIS — I34.0 MODERATE MITRAL REGURGITATION: Primary | ICD-10-CM

## 2019-06-19 DIAGNOSIS — E03.4 HYPOTHYROIDISM DUE TO ACQUIRED ATROPHY OF THYROID: ICD-10-CM

## 2019-06-19 DIAGNOSIS — I48.92 ATRIAL FLUTTER, UNSPECIFIED TYPE (HCC): ICD-10-CM

## 2019-06-19 DIAGNOSIS — E78.5 DYSLIPIDEMIA: ICD-10-CM

## 2019-06-19 DIAGNOSIS — I11.9 BENIGN HYPERTENSIVE HEART DISEASE WITHOUT HEART FAILURE: ICD-10-CM

## 2019-06-19 RX ORDER — DIGOXIN 125 MCG
TABLET ORAL
Qty: 90 TAB | Refills: 3 | Status: SHIPPED | OUTPATIENT
Start: 2019-06-19 | End: 2020-07-09

## 2019-06-19 NOTE — PROGRESS NOTES
HISTORY OF PRESENT ILLNESS  Radha Ko is a 72 y.o. male. Irregular Heart Beat    The history is provided by the patient. The current episode started more than 1 week ago. The problem has been rapidly improving. The problem occurs rarely. Pertinent negatives include no diaphoresis, no fever, no claudication, no near-syncope, no orthopnea, no PND, no syncope, no nausea, no vomiting, no leg pain, no dizziness, no weakness, no cough, no hemoptysis and no sputum production. Risk factors include dyslipidemia, hypertension and male gender. His past medical history is significant for hypertension and atrial fibrillation. His past medical history does not include hyperthyroidism. Shortness of Breath   The history is provided by the patient. This is a chronic problem. The problem occurs intermittently. The current episode started more than 1 week ago. The problem has not changed since onset. Pertinent negatives include no fever, no ear pain, no neck pain, no cough, no sputum production, no hemoptysis, no wheezing, no PND, no orthopnea, no syncope, no vomiting, no rash, no leg pain, no leg swelling and no claudication. He has had prior hospitalizations. Associated medical issues do not include chronic lung disease, CAD or heart failure. Hypertension   The history is provided by the patient. This is a chronic problem. The current episode started more than 1 week ago. The problem occurs every several days. The problem has not changed since onset. Review of Systems   Constitutional: Negative for chills, diaphoresis, fever and weight loss. HENT: Negative for ear pain and hearing loss. Eyes: Negative for blurred vision. Respiratory: Negative for cough, hemoptysis, sputum production, wheezing and stridor. Cardiovascular: Positive for palpitations. Negative for orthopnea, claudication, leg swelling, syncope, PND and near-syncope. Gastrointestinal: Negative for heartburn, nausea and vomiting. Musculoskeletal: Negative for myalgias and neck pain. Skin: Negative for rash. Neurological: Negative for dizziness, tingling, tremors, focal weakness, loss of consciousness and weakness. Psychiatric/Behavioral: Negative for depression and suicidal ideas. Family History   Problem Relation Age of Onset    Hypertension Mother     Heart Attack Father     Pacemaker Father        Past Medical History:   Diagnosis Date    AF (atrial fibrillation) ([facility] [address] 9/3/09    Atrial flutter (HCC)     BPH (benign prostatic hypertrophy) 9/3/09    Carotid duplex 05    No stenosis bilaterally     Diabetes mellitus, type 2 ([facility] [address]     Echocardiogram 10/27/2011    EF 60-65%. Gr 1 DDfx. RVSP 30 mmHg. Mild LAE. Mild MR. Diastolic doming of mitral valve. Mild IVCE.     Elevated PSA     Hypercholesterolemia     dislipidemia    Hypertension     Hypothyroidism     on replacement therapy    Long term (current) use of anticoagulants 3/16/2011    Lower extremity arterial duplex 09    No evidence of arterial insufficiency bilaterally at rest    Paroxysmal atrial fibrillation (HCC)     Personal history of urinary calculi 9/3/09    Stress thallium 08    Very small scarring in basal inferior wall w/min ischemia likely artifact; EF 68%; rapid AF during exercise; EKG portion negative which was terminated prematurely because of the persistent rapid AF       Past Surgical History:   Procedure Laterality Date    ABDOMEN SURGERY PROC UNLISTED      Colon surgical repair following puncture during removal of polyps    HX HERNIA REPAIR      , left; , right    HX LITHOTRIPSY  10/2004    Removed kidney stones    HX OTHER SURGICAL  2015    Scar tissue removed from bowels    HX TONSILLECTOMY      age 12       Social History     Tobacco Use    Smoking status: Former Smoker     Packs/day: 1.50     Years: 35.00     Pack years: 52.50     Last attempt to quit: 10/11/1980     Years since quittin.7  Smokeless tobacco: Never Used   Substance Use Topics    Alcohol use: No       Allergies   Allergen Reactions    Iodinated Contrast- Oral And Iv Dye Unknown (comments)     States no allergy to iodine    Other Medication Unknown (comments)     Anesthesia (nausea)       Outpatient Medications Marked as Taking for the 6/19/19 encounter (Office Visit) with Uriel Beckford MD   Medication Sig Dispense Refill    digoxin (DIGOX) 0.125 mg tablet TAKE 1 TABLET ONCE DAILY FOR HEART 90 Tab 3    metoprolol succinate (TOPROL-XL) 25 mg XL tablet TAKE (1) TABLET TWICE A  Tab 3    dutasteride (AVODART) 0.5 mg capsule Take 1 capsule twice a week 30 Cap 0    warfarin (COUMADIN) 5 mg tablet Alternate 10mg and 7.5mg every evening or as directed by physician. 120 Tab 0    levothyroxine (SYNTHROID) 112 mcg tablet       HUMALOG MIX 50-50 INSULN U-100 100 unit/mL (50-50) injection       lispro-protamine & lispro (HUMALOG MIX 50-50) 100 unit/mL (50-50) flexpen by SubCUTAneous route two (2) times a day. 22 units Q AM, 10 units QPM           Visit Vitals  /80   Pulse 66   Ht 5' 9\" (1.753 m)   Wt 70.8 kg (156 lb)   BMI 23.04 kg/m²     Physical Exam   Constitutional: He is oriented to person, place, and time. He appears well-developed and well-nourished. No distress. HENT:   Head: Atraumatic. Mouth/Throat: No oropharyngeal exudate. Eyes: Conjunctivae are normal. No scleral icterus. Neck: Neck supple. No JVD present. Cardiovascular: Normal rate and regular rhythm. Exam reveals no gallop. Murmur (3/6 holosystolic murmur best heard at mitral area with no radiation) heard. Pulmonary/Chest: Effort normal and breath sounds normal. No stridor. He has no wheezes. He has no rales. Abdominal: Soft. There is no tenderness. There is no rebound. Musculoskeletal: Normal range of motion. He exhibits no edema or tenderness. Neurological: He is alert and oriented to person, place, and time.  He exhibits normal muscle tone. Skin: Skin is warm. He is not diaphoretic. Psychiatric: He has a normal mood and affect. His behavior is normal.     ekg atrial fibrillaton with controlled ventricular response. 04/09/19   ECHO ADULT COMPLETE 04/11/2019 4/11/2019    Narrative · Estimated left ventricular ejection fraction is 61 - 65%. No regional   wall motion abnormality noted. Moderate (grade 2) left ventricular   diastolic dysfunction. · Left atrial cavity size is moderately dilated. · Right ventricular cavity size is mildly dilated. · Right atrial cavity size is moderately dilated. · Aortic valve leaflet calcification present with reduced excursion. Mild   aortic valve stenosis is present. Mild aortic valve regurgitation is   present. · Mitral valve thickening. Mild to moderate mitral valve regurgitation. · Mild to moderate tricuspid valve regurgitation is present. There is no   evidence of pulmonary hypertension. · Mild pulmonic valve regurgitation is present. · Mild aortic root (3.9 cm) and ascending aorta (4.2 cm) dilatation. · Mildly elevated central venous pressure (5-10 mmHg); IVC diameter is   less than 21 mm and collapses less than 50% with respiration. Signed by: Torsten Robbins MD     ASSESSMENT and PLAN    ICD-10-CM ICD-9-CM    1. Moderate mitral regurgitation I34.0 424.0    2. chronic atrial fibrillation I48.92 427.32 digoxin (DIGOX) 0.125 mg tablet   3. Hypertension I11.9 402.10    4. Dyslipidemia E78.5 272.4    5. Hypothyroidism due to acquired atrophy of thyroid E03.4 244.8      246.8      Follow-up and Dispositions    · Return in about 6 months (around 12/19/2019). Patient on lopressor and digoxin for rate control. Continue coumadin for anticoagulation. Echo report reviewed with patient. Has mild to moderate mitral regurgitation with mildly enlarged chamber sizes. No new symptoms since the last visit-   Follow up in 6months.

## 2019-06-19 NOTE — PATIENT INSTRUCTIONS
High Blood Pressure: Care Instructions  Overview    It's normal for blood pressure to go up and down throughout the day. But if it stays up, you have high blood pressure. Another name for high blood pressure is hypertension. Despite what a lot of people think, high blood pressure usually doesn't cause headaches or make you feel dizzy or lightheaded. It usually has no symptoms. But it does increase your risk of stroke, heart attack, and other problems. You and your doctor will talk about your risks of these problems based on your blood pressure. Your doctor will give you a goal for your blood pressure. Your goal will be based on your health and your age. Lifestyle changes, such as eating healthy and being active, are always important to help lower blood pressure. You might also take medicine to reach your blood pressure goal.  Follow-up care is a key part of your treatment and safety. Be sure to make and go to all appointments, and call your doctor if you are having problems. It's also a good idea to know your test results and keep a list of the medicines you take. How can you care for yourself at home? Medical treatment  · If you stop taking your medicine, your blood pressure will go back up. You may take one or more types of medicine to lower your blood pressure. Be safe with medicines. Take your medicine exactly as prescribed. Call your doctor if you think you are having a problem with your medicine. · Talk to your doctor before you start taking aspirin every day. Aspirin can help certain people lower their risk of a heart attack or stroke. But taking aspirin isn't right for everyone, because it can cause serious bleeding. · See your doctor regularly. You may need to see the doctor more often at first or until your blood pressure comes down. · If you are taking blood pressure medicine, talk to your doctor before you take decongestants or anti-inflammatory medicine, such as ibuprofen.  Some of these medicines can raise blood pressure. · Learn how to check your blood pressure at home. Lifestyle changes  · Stay at a healthy weight. This is especially important if you put on weight around the waist. Losing even 10 pounds can help you lower your blood pressure. · If your doctor recommends it, get more exercise. Walking is a good choice. Bit by bit, increase the amount you walk every day. Try for at least 30 minutes on most days of the week. You also may want to swim, bike, or do other activities. · Avoid or limit alcohol. Talk to your doctor about whether you can drink any alcohol. · Try to limit how much sodium you eat to less than 2,300 milligrams (mg) a day. Your doctor may ask you to try to eat less than 1,500 mg a day. · Eat plenty of fruits (such as bananas and oranges), vegetables, legumes, whole grains, and low-fat dairy products. · Lower the amount of saturated fat in your diet. Saturated fat is found in animal products such as milk, cheese, and meat. Limiting these foods may help you lose weight and also lower your risk for heart disease. · Do not smoke. Smoking increases your risk for heart attack and stroke. If you need help quitting, talk to your doctor about stop-smoking programs and medicines. These can increase your chances of quitting for good. When should you call for help? Call 911 anytime you think you may need emergency care. This may mean having symptoms that suggest that your blood pressure is causing a serious heart or blood vessel problem. Your blood pressure may be over 180/120.   For example, call 911 if:    · You have symptoms of a heart attack. These may include:  ? Chest pain or pressure, or a strange feeling in the chest.  ? Sweating. ? Shortness of breath. ? Nausea or vomiting. ? Pain, pressure, or a strange feeling in the back, neck, jaw, or upper belly or in one or both shoulders or arms. ? Lightheadedness or sudden weakness.   ? A fast or irregular heartbeat.     · You have symptoms of a stroke. These may include:  ? Sudden numbness, tingling, weakness, or loss of movement in your face, arm, or leg, especially on only one side of your body. ? Sudden vision changes. ? Sudden trouble speaking. ? Sudden confusion or trouble understanding simple statements. ? Sudden problems with walking or balance. ? A sudden, severe headache that is different from past headaches.     · You have severe back or belly pain.    Do not wait until your blood pressure comes down on its own. Get help right away.   Call your doctor now or seek immediate care if:    · Your blood pressure is much higher than normal (such as 180/120 or higher), but you don't have symptoms.     · You think high blood pressure is causing symptoms, such as:  ? Severe headache.  ? Blurry vision.    Watch closely for changes in your health, and be sure to contact your doctor if:    · Your blood pressure measures higher than your doctor recommends at least 2 times. That means the top number is higher or the bottom number is higher, or both.     · You think you may be having side effects from your blood pressure medicine. Where can you learn more? Go to http://rayray-bonifacio.info/. Enter Z491 in the search box to learn more about \"High Blood Pressure: Care Instructions. \"  Current as of: July 22, 2018  Content Version: 11.9  © 1190-2091 [employer]. Care instructions adapted under license by [employer] (which disclaims liability or warranty for this information). If you have questions about a medical condition or this instruction, always ask your healthcare professional. [place] [address] any warranty or liability for your use of this information. [employer] Activation    Thank you for requesting access to [employer]. Please follow the instructions below to securely access and download your online medical record.  [employer] allows you to send messages to your doctor, view your test results, renew your prescriptions, schedule appointments, and more. How Do I Sign Up? 1. In your internet browser, go to [email or web address]. 2. Click on the First Time User? Click Here link in the Sign In box. You will see the New Member Sign Up page. 3. Enter your [employer] Access Code exactly as it appears below. You will not need to use this code after youve completed the sign-up process. If you do not sign up before the expiration date, you must request a new code. [employer] Access Code: [email or web address]  Expires: 2019  7:48 AM (This is the date your [employer] access code will )    4. Enter the last four digits of your Social Security Number (xxxx) and Date of Birth (mm/dd/yyyy) as indicated and click Submit. You will be taken to the next sign-up page. 5. Create a [employer] ID. This will be your [employer] login ID and cannot be changed, so think of one that is secure and easy to remember. 6. Create a [employer] password. You can change your password at any time. 7. Enter your Password Reset Question and Answer. This can be used at a later time if you forget your password. 8. Enter your e-mail address. You will receive e-mail notification when new information is available in [address] 9. Click Sign Up. You can now view and download portions of your medical record. 10. Click the Download Summary menu link to download a portable copy of your medical information. Additional Information    If you have questions, please visit the Frequently Asked Questions section of the [employer] website at [email or web address] Remember, [employer] is NOT to be used for urgent needs. For medical emergencies, dial 911.

## 2019-06-19 NOTE — PROGRESS NOTES
1. Have you been to the ER, urgent care clinic since your last visit? Hospitalized since your last visit?     no    2. Have you seen or consulted any other health care providers outside of the [address] [place] [address] since your last visit? Include any pap smears or colon screening. Yes Where: pcp     3. Since your last visit, have you had any of the following symptoms?      dizziness. pt stated pcp said he had virtigo  4. Have you had any blood work, X-rays or cardiac testing?       Yes Where: pcp

## 2019-06-25 ENCOUNTER — TELEPHONE ANTICOAG (OUTPATIENT)
Dept: CARDIOLOGY CLINIC | Age: 74
End: 2019-06-25

## 2019-06-25 DIAGNOSIS — I48.91 ATRIAL FIBRILLATION, UNSPECIFIED TYPE (HCC): ICD-10-CM

## 2019-06-25 LAB — INR, EXTERNAL: 2.7

## 2019-06-25 NOTE — PROGRESS NOTES
Patient called in concern to coumadin dosing; no answer, voicemail left with instructions as follows. Per NP patient is to take 10 mg tonight, 7.5 mg tomorrow; repeating every two days , with an INR recheck on July 29, 2019. Patient reminded if any questions or concerns to call the office.

## 2019-06-26 DIAGNOSIS — I48.92 ATRIAL FLUTTER, UNSPECIFIED TYPE (HCC): ICD-10-CM

## 2019-06-26 RX ORDER — WARFARIN SODIUM 5 MG/1
TABLET ORAL
Qty: 105 TAB | Refills: 0 | Status: SHIPPED | OUTPATIENT
Start: 2019-06-26 | End: 2020-09-08 | Stop reason: SDUPTHER

## 2019-07-29 ENCOUNTER — TELEPHONE ANTICOAG (OUTPATIENT)
Dept: CARDIOLOGY CLINIC | Age: 74
End: 2019-07-29

## 2019-07-29 DIAGNOSIS — I48.91 ATRIAL FIBRILLATION, UNSPECIFIED TYPE (HCC): ICD-10-CM

## 2019-07-29 LAB — INR, EXTERNAL: 3

## 2019-07-29 NOTE — PATIENT INSTRUCTIONS
Patient called to review INR results, he will take Coumadin 10mh alt 7.5 mg daily and have INR repeated in 8/19/19. He voices understanding and acceptance of this advice and will call back if any further questions or concerns.

## 2019-08-19 ENCOUNTER — TELEPHONE ANTICOAG (OUTPATIENT)
Dept: CARDIOLOGY CLINIC | Age: 74
End: 2019-08-19

## 2019-08-19 DIAGNOSIS — I48.91 ATRIAL FIBRILLATION, UNSPECIFIED TYPE (HCC): ICD-10-CM

## 2019-08-19 LAB — INR, EXTERNAL: 2.6

## 2019-08-19 NOTE — PATIENT INSTRUCTIONS
Called and left detail message for patient regarding coumadin instructions to resume current dose of coumadin alternating 7.5 mg and 10 mg and recheck INR on 9/16/19. If any questions to call office.

## 2019-09-16 ENCOUNTER — TELEPHONE ANTICOAG (OUTPATIENT)
Dept: CARDIOLOGY CLINIC | Age: 74
End: 2019-09-16

## 2019-09-16 DIAGNOSIS — I48.91 ATRIAL FIBRILLATION, UNSPECIFIED TYPE (HCC): ICD-10-CM

## 2019-09-16 LAB — INR, EXTERNAL: 3.1

## 2019-09-16 NOTE — PATIENT INSTRUCTIONS
Mr. Christian Pimentel is here today for anticoagulation monitoring for the diagnosis of Atrial Fibrillation. }. His INR goal is 2.0-3.0 and his current Coumadin dose is 10 mg alternating 7.5 mg every 2 days. Today's findings include an INR of 3.1     Considering Mr. Daigle's past history, todays findings, and per the coumadin policy/protocol, Mr. Isabela Atkins was instructed to take Coumadin as follows, resume current dose of coumadin 10 mg alternating 7.5 mg every 2 days. He was also instructed to come back in 4 weeks for an INR check. A full discussion of the nature of anticoagulants has been carried out. A full discussion of the need for frequent and regular monitoring, precise dosage adjustment and compliance was stressed. Side effects of potential bleeding were discussed and Mr. Isabela Atkins was instructed to call [phone] if there are any signs of abnormal bleeding. Mr. Isabela Atkins was instructed to avoid any OTC items containing aspirin or ibuprofen and prior to starting any new OTC products to consult with his physician or pharmacist to ensure no drug interactions are present. Mr. Isabela Atkins was instructed to avoid any major changes in his general diet and to avoid alcohol consumption. .      Mr. Isabela Atkins verbalized his understanding of all instructions and will call the office with any questions, concerns, or signs of abnormal bleeding or blood clot.

## 2019-10-07 ENCOUNTER — TELEPHONE ANTICOAG (OUTPATIENT)
Dept: CARDIOLOGY CLINIC | Age: 74
End: 2019-10-07

## 2019-10-07 DIAGNOSIS — I48.91 ATRIAL FIBRILLATION, UNSPECIFIED TYPE (HCC): ICD-10-CM

## 2019-10-07 LAB — INR, EXTERNAL: 3.1

## 2019-10-07 NOTE — PATIENT INSTRUCTIONS
Mr. Shirley Pierre is here today for anticoagulation monitoring for the diagnosis of Atrial Fibrillation. His INR goal is 2.0-3.0 and his current Coumadin dose is alternat 10 mg and 7.5 mg every 2 days. Today's findings include an INR of 3.1     Considering Mr. Daigle's past history, todays findings, and per the coumadin policy/protocol, Mr. Daina Trujillo was instructed to take Coumadin as follows, take 7.5 mg tonight then continue current dose of coumadin. He was also instructed to come back in 2 weeks for an INR check. A full discussion of the nature of anticoagulants has been carried out. A full discussion of the need for frequent and regular monitoring, precise dosage adjustment and compliance was stressed. Side effects of potential bleeding were discussed and Mr. Daina Trujillo was instructed to call [phone] if there are any signs of abnormal bleeding. Mr. Daina Trujillo was instructed to avoid any OTC items containing aspirin or ibuprofen and prior to starting any new OTC products to consult with his physician or pharmacist to ensure no drug interactions are present. Mr. Daina Trujillo was instructed to avoid any major changes in his general diet and to avoid alcohol consumption. .      Mr. Daina Trujillo verbalized his understanding of all instructions and will call the office with any questions, concerns, or signs of abnormal bleeding or blood clot.

## 2019-10-21 ENCOUNTER — TELEPHONE ANTICOAG (OUTPATIENT)
Dept: CARDIOLOGY CLINIC | Age: 74
End: 2019-10-21

## 2019-10-21 DIAGNOSIS — I48.91 ATRIAL FIBRILLATION, UNSPECIFIED TYPE (HCC): ICD-10-CM

## 2019-10-21 LAB — INR, EXTERNAL: 2.8

## 2019-10-21 NOTE — PATIENT INSTRUCTIONS
Mr. Arslan Perez is here today for anticoagulation monitoring for the diagnosis of Atrial Fibrillation. His INR goal is 2.0-3.0 and his current Coumadin dose is alternating 10 mg and 7.5 mg every 2 days. Today's findings include an INR of 2.8 Considering Mr. Daigle's past history, todays findings, and per the coumadin policy/protocol, Mr. Claire Melendrez was instructed to take Coumadin as follows, resume current dose of coumadin. He was also instructed to come back in 3 weeks for an INR check. A full discussion of the nature of anticoagulants has been carried out. A full discussion of the need for frequent and regular monitoring, precise dosage adjustment and compliance was stressed. Side effects of potential bleeding were discussed and Mr. Claire Melendrez was instructed to call [phone] if there are any signs of abnormal bleeding. Mr. Claire Melendrez was instructed to avoid any OTC items containing aspirin or ibuprofen and prior to starting any new OTC products to consult with his physician or pharmacist to ensure no drug interactions are present. Mr. Claire Melendrez was instructed to avoid any major changes in his general diet and to avoid alcohol consumption. . 
 
 
Mr. Claire Melendrez verbalized his understanding of all instructions and will call the office with any questions, concerns, or signs of abnormal bleeding or blood clot.

## 2019-10-30 RX ORDER — WARFARIN SODIUM 5 MG/1
TABLET ORAL
Qty: 105 TAB | Refills: 0 | Status: SHIPPED | OUTPATIENT
Start: 2019-10-30 | End: 2019-12-04 | Stop reason: SDUPTHER

## 2019-11-11 ENCOUNTER — TELEPHONE ANTICOAG (OUTPATIENT)
Dept: CARDIOLOGY CLINIC | Age: 74
End: 2019-11-11

## 2019-11-11 DIAGNOSIS — I48.91 ATRIAL FIBRILLATION, UNSPECIFIED TYPE (HCC): ICD-10-CM

## 2019-11-11 LAB — INR, EXTERNAL: 3.2

## 2019-11-11 NOTE — PATIENT INSTRUCTIONS
Patient called to review INR results, he will 2.5 mg of Coumadin daily and have INR repeated in 11/18/19. He voices understanding and acceptance of this advice and will call back if any further questions or concerns.

## 2019-11-18 ENCOUNTER — TELEPHONE ANTICOAG (OUTPATIENT)
Dept: CARDIOLOGY CLINIC | Age: 74
End: 2019-11-18

## 2019-11-18 DIAGNOSIS — I48.91 ATRIAL FIBRILLATION, UNSPECIFIED TYPE (HCC): ICD-10-CM

## 2019-11-18 LAB — INR, EXTERNAL: 1.2

## 2019-11-18 NOTE — PATIENT INSTRUCTIONS
Patient called to review INR results, he will take 2.5 mg today and then resume 5 mg on Monday and Thursday and 2.5 mg of  Coumadin daily and have INR repeated in 11/25/19. He voices understanding and acceptance of this advice and will call back if any further questions or concerns.

## 2019-11-25 ENCOUNTER — TELEPHONE ANTICOAG (OUTPATIENT)
Dept: CARDIOLOGY CLINIC | Age: 74
End: 2019-11-25

## 2019-11-25 DIAGNOSIS — I48.91 ATRIAL FIBRILLATION, UNSPECIFIED TYPE (HCC): ICD-10-CM

## 2019-11-25 LAB — INR, EXTERNAL: 2

## 2019-12-02 ENCOUNTER — TELEPHONE ANTICOAG (OUTPATIENT)
Dept: CARDIOLOGY CLINIC | Age: 74
End: 2019-12-02

## 2019-12-02 DIAGNOSIS — I48.91 ATRIAL FIBRILLATION, UNSPECIFIED TYPE (HCC): ICD-10-CM

## 2019-12-02 LAB — INR, EXTERNAL: 2.3

## 2019-12-04 ENCOUNTER — OFFICE VISIT (OUTPATIENT)
Dept: CARDIOLOGY CLINIC | Age: 74
End: 2019-12-04

## 2019-12-04 VITALS
SYSTOLIC BLOOD PRESSURE: 138 MMHG | DIASTOLIC BLOOD PRESSURE: 81 MMHG | WEIGHT: 155 LBS | HEIGHT: 69 IN | BODY MASS INDEX: 22.96 KG/M2 | HEART RATE: 79 BPM

## 2019-12-04 DIAGNOSIS — E11.9 TYPE 2 DIABETES MELLITUS WITHOUT COMPLICATION, UNSPECIFIED WHETHER LONG TERM INSULIN USE (HCC): ICD-10-CM

## 2019-12-04 DIAGNOSIS — I11.9 BENIGN HYPERTENSIVE HEART DISEASE WITHOUT HEART FAILURE: ICD-10-CM

## 2019-12-04 DIAGNOSIS — E03.4 HYPOTHYROIDISM DUE TO ACQUIRED ATROPHY OF THYROID: ICD-10-CM

## 2019-12-04 DIAGNOSIS — I34.0 MODERATE MITRAL REGURGITATION: ICD-10-CM

## 2019-12-04 DIAGNOSIS — I48.20 CHRONIC ATRIAL FIBRILLATION (HCC): Primary | ICD-10-CM

## 2019-12-04 RX ORDER — WARFARIN SODIUM 5 MG/1
TABLET ORAL
Qty: 180 TAB | Refills: 0 | Status: SHIPPED | OUTPATIENT
Start: 2019-12-04 | End: 2020-04-30

## 2019-12-04 NOTE — PROGRESS NOTES
HISTORY OF PRESENT ILLNESS  Fay Sorensen is a 69 y.o. male. Palpitations    The history is provided by the patient. This is a chronic problem. The current episode started more than 1 week ago. The problem has been rapidly improving. The problem occurs rarely. Pertinent negatives include no diaphoresis, no fever, no claudication, no near-syncope, no orthopnea, no PND, no syncope, no nausea, no vomiting, no leg pain, no dizziness, no weakness, no cough, no hemoptysis and no sputum production. Risk factors include dyslipidemia, hypertension and male gender. His past medical history is significant for atrial fibrillation. His past medical history does not include hyperthyroidism. Shortness of Breath   The history is provided by the patient. This is a chronic problem. The problem occurs intermittently. The current episode started more than 1 week ago. The problem has not changed since onset. Pertinent negatives include no fever, no ear pain, no neck pain, no cough, no sputum production, no hemoptysis, no wheezing, no PND, no orthopnea, no syncope, no vomiting, no rash, no leg pain, no leg swelling and no claudication. He has had prior hospitalizations. Associated medical issues do not include chronic lung disease, CAD or heart failure. Review of Systems   Constitutional: Negative for chills, diaphoresis, fever and weight loss. HENT: Negative for ear pain and hearing loss. Eyes: Negative for blurred vision. Respiratory: Negative for cough, hemoptysis, sputum production, wheezing and stridor. Cardiovascular: Positive for palpitations. Negative for orthopnea, claudication, leg swelling, syncope, PND and near-syncope. Gastrointestinal: Negative for heartburn, nausea and vomiting. Musculoskeletal: Negative for myalgias and neck pain. Skin: Negative for rash. Neurological: Negative for dizziness, tingling, tremors, focal weakness, loss of consciousness and weakness.    Psychiatric/Behavioral: Negative for depression and suicidal ideas. Family History   Problem Relation Age of Onset    Hypertension Mother     Heart Attack Father     Pacemaker Father        Past Medical History:   Diagnosis Date    AF (atrial fibrillation) ([facility] [address] 9/3/09    Atrial flutter (HCC)     BPH (benign prostatic hypertrophy) 9/3/09    Carotid duplex 05    No stenosis bilaterally     Diabetes mellitus, type 2 ([facility] [address]     Echocardiogram 10/27/2011    EF 60-65%. Gr 1 DDfx. RVSP 30 mmHg. Mild LAE. Mild MR. Diastolic doming of mitral valve. Mild IVCE.     Elevated PSA     Hypercholesterolemia     dislipidemia    Hypertension     Hypothyroidism     on replacement therapy    Long term (current) use of anticoagulants 3/16/2011    Lower extremity arterial duplex 09    No evidence of arterial insufficiency bilaterally at rest    Paroxysmal atrial fibrillation (HCC)     Personal history of urinary calculi 9/3/09    Stress thallium 08    Very small scarring in basal inferior wall w/min ischemia likely artifact; EF 68%; rapid AF during exercise; EKG portion negative which was terminated prematurely because of the persistent rapid AF       Past Surgical History:   Procedure Laterality Date    ABDOMEN SURGERY [address]    Colon surgical repair following puncture during removal of polyps    HX HERNIA REPAIR      , left; , right    HX LITHOTRIPSY  10/2004    Removed kidney stones    HX OTHER SURGICAL  2015    Scar tissue removed from bowels    HX TONSILLECTOMY      age 12       Social History     Tobacco Use    Smoking status: Former Smoker     Packs/day: 1.50     Years: 35.00     Pack years: 52.50     Last attempt to quit: 10/11/1980     Years since quittin.1    Smokeless tobacco: Never Used   Substance Use Topics    Alcohol use: No       Allergies   Allergen Reactions    Iodinated Contrast Media Unknown (comments)     States no allergy to iodine    Other Medication Unknown (comments)     Anesthesia (nausea)       Outpatient Medications Marked as Taking for the 12/4/19 encounter (Office Visit) with Lexi Brewer MD   Medication Sig Dispense Refill    warfarin (COUMADIN) 5 mg tablet Take (2) tabs altnerating with (1 1/2) tab every other evening. 105 Tab 0    insulin lispro protamin/lispro (HUMALOG MIX 50-50 SC) by SubCUTAneous route.  BD VEO INSULIN SYRINGE UF 1/2 mL 31 gauge x 15/64\" syrg       dutasteride (AVODART) 0.5 mg capsule Take 1 capsule twice a week. 30 Cap 3    warfarin (COUMADIN) 5 mg tablet Take (2) tabs altnerating with (1 1/2) tab every other evening. 105 Tab 0    digoxin (DIGOX) 0.125 mg tablet TAKE 1 TABLET ONCE DAILY FOR HEART 90 Tab 3    metoprolol succinate (TOPROL-XL) 25 mg XL tablet TAKE (1) TABLET TWICE A  Tab 3    warfarin (COUMADIN) 5 mg tablet Alternate 10mg and 7.5mg every evening or as directed by physician. 120 Tab 0    levothyroxine (SYNTHROID) 112 mcg tablet       HUMALOG MIX 50-50 INSULN U-100 100 unit/mL (50-50) injection       lispro-protamine & lispro (HUMALOG MIX 50-50) 100 unit/mL (50-50) flexpen by SubCUTAneous route two (2) times a day. 22 units Q AM, 10 units QPM           Visit Vitals  /81   Pulse 79   Ht 5' 9\" (1.753 m)   Wt 70.3 kg (155 lb)   BMI 22.89 kg/m²     Physical Exam   Constitutional: He is oriented to person, place, and time. He appears well-developed and well-nourished. No distress. HENT:   Head: Atraumatic. Mouth/Throat: No oropharyngeal exudate. Eyes: Conjunctivae are normal. No scleral icterus. Neck: Neck supple. No JVD present. Cardiovascular: Normal rate and regular rhythm. Exam reveals no gallop. Murmur (3/6 holosystolic murmur best heard at mitral area with no radiation) heard. Pulmonary/Chest: Effort normal and breath sounds normal. No stridor. He has no wheezes. He has no rales. Abdominal: Soft. There is no tenderness. There is no rebound.    Musculoskeletal: Normal range of motion. General: No tenderness or edema. Neurological: He is alert and oriented to person, place, and time. He exhibits normal muscle tone. Skin: Skin is warm. He is not diaphoretic. Psychiatric: He has a normal mood and affect. His behavior is normal.     ekg atrial fibrillaton with controlled ventricular response. 04/09/19   ECHO ADULT COMPLETE 04/11/2019 4/11/2019    Narrative · Estimated left ventricular ejection fraction is 61 - 65%. No regional   wall motion abnormality noted. Moderate (grade 2) left ventricular   diastolic dysfunction. · Left atrial cavity size is moderately dilated. · Right ventricular cavity size is mildly dilated. · Right atrial cavity size is moderately dilated. · Aortic valve leaflet calcification present with reduced excursion. Mild   aortic valve stenosis is present. Mild aortic valve regurgitation is   present. · Mitral valve thickening. Mild to moderate mitral valve regurgitation. · Mild to moderate tricuspid valve regurgitation is present. There is no   evidence of pulmonary hypertension. · Mild pulmonic valve regurgitation is present. · Mild aortic root (3.9 cm) and ascending aorta (4.2 cm) dilatation. · Mildly elevated central venous pressure (5-10 mmHg); IVC diameter is   less than 21 mm and collapses less than 50% with respiration. Signed by: Nora Pierre MD     ASSESSMENT and PLAN    ICD-10-CM ICD-9-CM    1. Chronic atrial fibrillation I48.20 427.31    2. Moderate mitral regurgitation I34.0 424.0    3. Hypertension I11.9 402.10    4. Hypothyroidism due to acquired atrophy of thyroid E03.4 244.8      246.8    5. Type 2 diabetes mellitus without complication, unspecified whether long term insulin use (HCC) E11.9 250.00           Patient on lopressor and digoxin for rate control. Continue coumadin for anticoagulation. Has mild to moderate mitral regurgitation with mildly enlarged chamber sizes.   No new symptoms since the last visit- Follow up in 6months.

## 2019-12-04 NOTE — PATIENT INSTRUCTIONS
Atrial Fibrillation: Care Instructions  Your Care Instructions    Atrial fibrillation is an irregular and often fast heartbeat. Treating this condition is important for several reasons. It can cause blood clots, which can travel from your heart to your brain and cause a stroke. If you have a fast heartbeat, you may feel lightheaded, dizzy, and weak. An irregular heartbeat can also increase your risk for heart failure. Atrial fibrillation is often the result of another heart condition, such as high blood pressure or coronary artery disease. Making changes to improve your heart condition will help you stay healthy and active. Follow-up care is a key part of your treatment and safety. Be sure to make and go to all appointments, and call your doctor if you are having problems. It's also a good idea to know your test results and keep a list of the medicines you take. How can you care for yourself at home? Medicines    · Take your medicines exactly as prescribed. Call your doctor if you think you are having a problem with your medicine. You will get more details on the specific medicines your doctor prescribes.     · If your doctor has given you a blood thinner to prevent a stroke, be sure you get instructions about how to take your medicine safely. Blood thinners can cause serious bleeding problems.     · Do not take any vitamins, over-the-counter drugs, or herbal products without talking to your doctor first.    Lifestyle changes    · Do not smoke. Smoking can increase your chance of a stroke and heart attack. If you need help quitting, talk to your doctor about stop-smoking programs and medicines. These can increase your chances of quitting for good.     · Eat a heart-healthy diet.     · Stay at a healthy weight. Lose weight if you need to.     · Limit alcohol to 2 drinks a day for men and 1 drink a day for women. Too much alcohol can cause health problems.     · Avoid colds and flu.  Get a pneumococcal vaccine shot. If you have had one before, ask your doctor whether you need another dose. Get a flu shot every year. If you must be around people with colds or flu, wash your hands often. Activity    · If your doctor recommends it, get more exercise. Walking is a good choice. Bit by bit, increase the amount you walk every day. Try for at least 30 minutes on most days of the week. You also may want to swim, bike, or do other activities. Your doctor may suggest that you join a cardiac rehabilitation program so that you can have help increasing your physical activity safely.     · Start light exercise if your doctor says it is okay. Even a small amount will help you get stronger, have more energy, and manage stress. Walking is an easy way to get exercise. Start out by walking a little more than you did in the hospital. Gradually increase the amount you walk.     · When you exercise, watch for signs that your heart is working too hard. You are pushing too hard if you cannot talk while you are exercising. If you become short of breath or dizzy or have chest pain, sit down and rest immediately.     · Check your pulse regularly. Place two fingers on the artery at the palm side of your wrist, in line with your thumb. If your heartbeat seems uneven or fast, talk to your doctor. When should you call for help? Call 911 anytime you think you may need emergency care. For example, call if:    · You have symptoms of a heart attack. These may include:  ? Chest pain or pressure, or a strange feeling in the chest.  ? Sweating. ? Shortness of breath. ? Nausea or vomiting. ? Pain, pressure, or a strange feeling in the back, neck, jaw, or upper belly or in one or both shoulders or arms. ? Lightheadedness or sudden weakness. ? A fast or irregular heartbeat. After you call 911, the  may tell you to chew 1 adult-strength or 2 to 4 low-dose aspirin. Wait for an ambulance.  Do not try to drive yourself.     · You have symptoms of a stroke. These may include:  ? Sudden numbness, tingling, weakness, or loss of movement in your face, arm, or leg, especially on only one side of your body. ? Sudden vision changes. ? Sudden trouble speaking. ? Sudden confusion or trouble understanding simple statements. ? Sudden problems with walking or balance. ? A sudden, severe headache that is different from past headaches.     · You passed out (lost consciousness).    Call your doctor now or seek immediate medical care if:    · You have new or increased shortness of breath.     · You feel dizzy or lightheaded, or you feel like you may faint.     · Your heart rate becomes irregular.     · You can feel your heart flutter in your chest or skip heartbeats. Tell your doctor if these symptoms are new or worse.    Watch closely for changes in your health, and be sure to contact your doctor if you have any problems. Where can you learn more? Go to http://rayray-bonifacio.info/. Enter U020 in the search box to learn more about \"Atrial Fibrillation: Care Instructions. \"  Current as of: April 9, 2019  Content Version: 12.2  © 2761-6155 [employer]. Care instructions adapted under license by [employer] (which disclaims liability or warranty for this information). If you have questions about a medical condition or this instruction, always ask your healthcare professional. [address] any warranty or liability for your use of this information. [employer] Activation    Thank you for requesting access to [employer]. Please follow the instructions below to securely access and download your online medical record. [employer] allows you to send messages to your doctor, view your test results, renew your prescriptions, schedule appointments, and more. How Do I Sign Up? 1. In your internet browser, go to [email or web address]. 2. Click on the First Time User?  Click Here link in the Sign In box. You will see the New Member Sign Up page. 3. Enter your [employer] Access Code exactly as it appears below. You will not need to use this code after youve completed the sign-up process. If you do not sign up before the expiration date, you must request a new code. [employer] Access Code: [email or web address]  Expires: 2019  7:53 AM (This is the date your [employer] access code will )    4. Enter the last four digits of your Social Security Number (xxxx) and Date of Birth (mm/dd/yyyy) as indicated and click Submit. You will be taken to the next sign-up page. 5. Create a [employer] ID. This will be your [employer] login ID and cannot be changed, so think of one that is secure and easy to remember. 6. Create a [employer] password. You can change your password at any time. 7. Enter your Password Reset Question and Answer. This can be used at a later time if you forget your password. 8. Enter your e-mail address. You will receive e-mail notification when new information is available in [address] 9. Click Sign Up. You can now view and download portions of your medical record. 10. Click the Download Summary menu link to download a portable copy of your medical information. Additional Information    If you have questions, please visit the Frequently Asked Questions section of the [employer] website at [email or web address] Remember, [employer] is NOT to be used for urgent needs. For medical emergencies, dial 911.

## 2019-12-04 NOTE — PROGRESS NOTES
1. Have you been to the ER, urgent care clinic since your last visit? Hospitalized since your last visit?     no    2. Have you seen or consulted any other health care providers outside of the [address] [place] [address] since your last visit? Include any pap smears or colon screening. Yes Where: pcp     3. Since your last visit, have you had any of the following symptoms?  no

## 2019-12-16 ENCOUNTER — TELEPHONE ANTICOAG (OUTPATIENT)
Dept: CARDIOLOGY CLINIC | Age: 74
End: 2019-12-16

## 2019-12-16 DIAGNOSIS — I48.91 ATRIAL FIBRILLATION, UNSPECIFIED TYPE (HCC): ICD-10-CM

## 2019-12-16 LAB — INR, EXTERNAL: 2.3

## 2019-12-16 NOTE — PATIENT INSTRUCTIONS
Patient called to review INR results, he will continue taking 5 mg on Monday and Thursday and 2.5 mg all other days of Coumadin daily and have INR repeated in 12/30/19. He voices understanding and acceptance of this advice and will call back if any further questions or concerns.

## 2019-12-30 ENCOUNTER — TELEPHONE ANTICOAG (OUTPATIENT)
Dept: CARDIOLOGY CLINIC | Age: 74
End: 2019-12-30

## 2019-12-30 DIAGNOSIS — I48.91 ATRIAL FIBRILLATION, UNSPECIFIED TYPE (HCC): ICD-10-CM

## 2019-12-30 LAB — INR, EXTERNAL: 2.5

## 2019-12-30 NOTE — PATIENT INSTRUCTIONS
Mr. Le Connell is here today for anticoagulation monitoring for the diagnosis of Atrial Fibrillation. His INR goal is 2.0-3.0 and his current Coumadin dose is 5 mg on Monday and Thursday and 2.5 mg all other days. Today's findings include an INR of 2.5     Considering Mr. Daigle's past history, todays findings, and per the coumadin policy/protocol, Mr. Pastor Brown was instructed to take Coumadin as follows,  Resume taking current dose of coumadin. He was also instructed to come back in 4 weeks for an INR check. A full discussion of the nature of anticoagulants has been carried out. A full discussion of the need for frequent and regular monitoring, precise dosage adjustment and compliance was stressed. Side effects of potential bleeding were discussed and Mr. Pastor Brown was instructed to call [phone] if there are any signs of abnormal bleeding. Mr. Pastor Bronw was instructed to avoid any OTC items containing aspirin or ibuprofen and prior to starting any new OTC products to consult with his physician or pharmacist to ensure no drug interactions are present. Mr. Pastor Brown was instructed to avoid any major changes in his general diet and to avoid alcohol consumption. .      Mr. Pastor Brown verbalized his understanding of all instructions and will call the office with any questions, concerns, or signs of abnormal bleeding or blood clot.

## 2020-01-20 ENCOUNTER — TELEPHONE ANTICOAG (OUTPATIENT)
Dept: CARDIOLOGY CLINIC | Age: 75
End: 2020-01-20

## 2020-01-20 DIAGNOSIS — I48.91 ATRIAL FIBRILLATION, UNSPECIFIED TYPE (HCC): ICD-10-CM

## 2020-01-20 LAB — INR, EXTERNAL: 2.4

## 2020-01-20 NOTE — PATIENT INSTRUCTIONS
Mr. Jayce Chen is here today for anticoagulation monitoring for the diagnosis of Atrial Fibrillation. His INR goal is 2.0-3.0 and his current Coumadin dose is 5 mg on Monday and Thursday and 2.5 mg AOD. Today's findings include an INR of 2.4 Considering Mr. Daigle's past history, todays findings, and per the coumadin policy/protocol, Mr. Alejos Mohs was instructed to take Coumadin as follows,  Resume current dose of coumadin. He was also instructed to come back in 4 weeks for an INR check. A full discussion of the nature of anticoagulants has been carried out. A full discussion of the need for frequent and regular monitoring, precise dosage adjustment and compliance was stressed. Side effects of potential bleeding were discussed and Mr. Alejos Mohs was instructed to call [phone] if there are any signs of abnormal bleeding. Mr. Alejos Mohs was instructed to avoid any OTC items containing aspirin or ibuprofen and prior to starting any new OTC products to consult with his physician or pharmacist to ensure no drug interactions are present. Mr. Alejos Mohs was instructed to avoid any major changes in his general diet and to avoid alcohol consumption. . 
 
 
Mr. Alejos Mohs verbalized his understanding of all instructions and will call the office with any questions, concerns, or signs of abnormal bleeding or blood clot.

## 2020-02-17 ENCOUNTER — TELEPHONE ANTICOAG (OUTPATIENT)
Dept: CARDIOLOGY CLINIC | Age: 75
End: 2020-02-17

## 2020-02-17 DIAGNOSIS — I48.91 ATRIAL FIBRILLATION, UNSPECIFIED TYPE (HCC): ICD-10-CM

## 2020-02-17 LAB — INR, EXTERNAL: 2.6

## 2020-02-17 NOTE — PATIENT INSTRUCTIONS
Patient called to review INR results, he will continue taking 5 mg on Monday and Thursday and 2.5 mg all other days of Coumadin and have INR repeated in 3/16/2020. He voices understanding and acceptance of this advice and will call back if any further questions or concerns.

## 2020-03-05 NOTE — PATIENT INSTRUCTIONS
Patient called to review INR results, he will continue taking 5 mg on Monday and Thursday and then 2.5 mg All other days of Coumadin and have INR repeated in 12/16/19. He voices understanding and acceptance of this advice and will call back if any further questions or concerns. 0

## 2020-03-16 ENCOUNTER — TELEPHONE ANTICOAG (OUTPATIENT)
Dept: CARDIOLOGY CLINIC | Age: 75
End: 2020-03-16

## 2020-03-16 DIAGNOSIS — I48.91 ATRIAL FIBRILLATION, UNSPECIFIED TYPE (HCC): ICD-10-CM

## 2020-03-16 LAB — INR, EXTERNAL: 2.5

## 2020-03-16 NOTE — PATIENT INSTRUCTIONS
Mr. Daniel Deutsch is here today for anticoagulation monitoring for the diagnosis of Atrial Fibrillation. His INR goal is 2.0-3.0 and his current Coumadin dose is 2.5 mg daily except Monday and Thursday 5 mg . Today's findings include an INR of 2.5 Considering Mr. Daigle's past history, todays findings, and per the coumadin policy/protocol, Mr. Warren Felipe was instructed to take Coumadin as follows,  Resume current dose of couamdin. He was also instructed to come back in 4 weeks for an INR check. A full discussion of the nature of anticoagulants has been carried out. A full discussion of the need for frequent and regular monitoring, precise dosage adjustment and compliance was stressed. Side effects of potential bleeding were discussed and Mr. Warren Felipe was instructed to call [phone] if there are any signs of abnormal bleeding. Mr. Warren Felipe was instructed to avoid any OTC items containing aspirin or ibuprofen and prior to starting any new OTC products to consult with his physician or pharmacist to ensure no drug interactions are present. Mr. Warren Felipe was instructed to avoid any major changes in his general diet and to avoid alcohol consumption. . 
 
 
Mr. Warren Felipe verbalized his understanding of all instructions and will call the office with any questions, concerns, or signs of abnormal bleeding or blood clot.

## 2020-04-13 ENCOUNTER — TELEPHONE ANTICOAG (OUTPATIENT)
Dept: CARDIOLOGY CLINIC | Age: 75
End: 2020-04-13

## 2020-04-13 DIAGNOSIS — I48.91 ATRIAL FIBRILLATION, UNSPECIFIED TYPE (HCC): ICD-10-CM

## 2020-04-13 LAB — INR, EXTERNAL: 2.9

## 2020-04-13 NOTE — PATIENT INSTRUCTIONS
Mr. Awa Treviño is here today for anticoagulation monitoring for the diagnosis of Atrial Fibrillation. His INR goal is 2.0-3.0 and his current Coumadin dose is 5 mg on Monday and Thursday and 2.5 mg AOD. Today's findings include an INR of 2.9    Considering Mr. Daigle's past history, todays findings, and per the coumadin policy/protocol, Mr. Sandra Barnard was instructed to take Coumadin as follows,  Resume current dose of coumadin. He was also instructed to come back in 4 weeks for an INR check. A full discussion of the nature of anticoagulants has been carried out. A full discussion of the need for frequent and regular monitoring, precise dosage adjustment and compliance was stressed. Side effects of potential bleeding were discussed and Mr. Sandra Barnard was instructed to call [phone] if there are any signs of abnormal bleeding. Mr. Sandra Barnard was instructed to avoid any OTC items containing aspirin or ibuprofen and prior to starting any new OTC products to consult with his physician or pharmacist to ensure no drug interactions are present. Mr. Sandra Barnard was instructed to avoid any major changes in his general diet and to avoid alcohol consumption. .      Mr. Sandra Barnard verbalized his understanding of all instructions and will call the office with any questions, concerns, or signs of abnormal bleeding or blood clot.

## 2020-04-30 RX ORDER — WARFARIN SODIUM 5 MG/1
TABLET ORAL
Qty: 105 TAB | Refills: 0 | Status: SHIPPED | OUTPATIENT
Start: 2020-04-30 | End: 2020-09-09

## 2020-05-11 ENCOUNTER — TELEPHONE ANTICOAG (OUTPATIENT)
Dept: CARDIOLOGY CLINIC | Age: 75
End: 2020-05-11

## 2020-05-11 DIAGNOSIS — I48.91 ATRIAL FIBRILLATION, UNSPECIFIED TYPE (HCC): ICD-10-CM

## 2020-05-11 LAB — INR, EXTERNAL: 2.9

## 2020-05-11 NOTE — PATIENT INSTRUCTIONS
Mr. Dominique Lancaster is here today for anticoagulation monitoring for the diagnosis of Atrial Fibrillation. His INR goal is 2.0-3.0 and his current Coumadin dose is 2.5 mg everyday except Monday and Thursday 5 mg . Today's findings include an INR of 2.9 Considering Mr. Daigle's past history, todays findings, and per the coumadin policy/protocol, Mr. Mikala Chu was instructed to take Coumadin as follows,  Resume current dose of coumadin. He was also instructed to come back in 4 weeks for an INR check. A full discussion of the nature of anticoagulants has been carried out. A full discussion of the need for frequent and regular monitoring, precise dosage adjustment and compliance was stressed. Side effects of potential bleeding were discussed and Mr. Mikala Chu was instructed to call [phone] if there are any signs of abnormal bleeding. Mr. Mikala Chu was instructed to avoid any OTC items containing aspirin or ibuprofen and prior to starting any new OTC products to consult with his physician or pharmacist to ensure no drug interactions are present. Mr. Mikala Chu was instructed to avoid any major changes in his general diet and to avoid alcohol consumption. . 
 
 
Mr. Mikala Chu verbalized his understanding of all instructions and will call the office with any questions, concerns, or signs of abnormal bleeding or blood clot.

## 2020-06-08 ENCOUNTER — TELEPHONE ANTICOAG (OUTPATIENT)
Dept: CARDIOLOGY CLINIC | Age: 75
End: 2020-06-08

## 2020-06-08 DIAGNOSIS — I48.91 ATRIAL FIBRILLATION, UNSPECIFIED TYPE (HCC): ICD-10-CM

## 2020-06-08 LAB — INR, EXTERNAL: 3.9

## 2020-06-15 ENCOUNTER — TELEPHONE ANTICOAG (OUTPATIENT)
Dept: CARDIOLOGY CLINIC | Age: 75
End: 2020-06-15

## 2020-06-15 DIAGNOSIS — I48.91 ATRIAL FIBRILLATION, UNSPECIFIED TYPE (HCC): ICD-10-CM

## 2020-06-15 LAB — INR, EXTERNAL: 2

## 2020-06-15 NOTE — PATIENT INSTRUCTIONS
Mr. Tonja Gale is here today for anticoagulation monitoring for the diagnosis of Atrial Fibrillation. His INR goal is 2.0-3.0 and his current Coumadin dose is 5 mg on Monday and Thursday and 2.5 mg all other days. Today's findings include an INR of 2.0     Considering Mr. Daigle's past history, todays findings, and per the coumadin policy/protocol, Mr. Ian Roper was instructed to take Coumadin as follows,  Continue taking 5 mg on Monday and Thursday and 2.5 mg all other days. He was also instructed to come back in 2 weeks for an INR check. A full discussion of the nature of anticoagulants has been carried out. A full discussion of the need for frequent and regular monitoring, precise dosage adjustment and compliance was stressed. Side effects of potential bleeding were discussed and Mr. Ian Roper was instructed to call [phone] if there are any signs of abnormal bleeding. Mr. Ian Roper was instructed to avoid any OTC items containing aspirin or ibuprofen and prior to starting any new OTC products to consult with his physician or pharmacist to ensure no drug interactions are present. Mr. Ian Roper was instructed to avoid any major changes in his general diet and to avoid alcohol consumption. .      Mr. Ian Roper verbalized his understanding of all instructions and will call the office with any questions, concerns, or signs of abnormal bleeding or blood clot.

## 2020-06-17 ENCOUNTER — OFFICE VISIT (OUTPATIENT)
Dept: CARDIOLOGY CLINIC | Age: 75
End: 2020-06-17

## 2020-06-17 VITALS
WEIGHT: 160 LBS | DIASTOLIC BLOOD PRESSURE: 81 MMHG | HEART RATE: 90 BPM | BODY MASS INDEX: 23.7 KG/M2 | SYSTOLIC BLOOD PRESSURE: 148 MMHG | HEIGHT: 69 IN

## 2020-06-17 DIAGNOSIS — I34.0 MODERATE MITRAL REGURGITATION: ICD-10-CM

## 2020-06-17 DIAGNOSIS — E03.4 HYPOTHYROIDISM DUE TO ACQUIRED ATROPHY OF THYROID: ICD-10-CM

## 2020-06-17 DIAGNOSIS — I48.91 ATRIAL FIBRILLATION, UNSPECIFIED TYPE (HCC): Primary | ICD-10-CM

## 2020-06-17 DIAGNOSIS — I11.9 BENIGN HYPERTENSIVE HEART DISEASE WITHOUT HEART FAILURE: ICD-10-CM

## 2020-06-17 DIAGNOSIS — E11.9 TYPE 2 DIABETES MELLITUS WITHOUT COMPLICATION, UNSPECIFIED WHETHER LONG TERM INSULIN USE (HCC): ICD-10-CM

## 2020-06-17 RX ORDER — ACETAMINOPHEN 500 MG
TABLET ORAL 2 TIMES DAILY
COMMUNITY

## 2020-06-17 NOTE — PROGRESS NOTES
1. Have you been to the ER, urgent care clinic since your last visit? Hospitalized since your last visit?     no  2. Have you seen or consulted any other health care providers outside of the [address] [place] [address] since your last visit? Include any pap smears or colon screening. No     3. Since your last visit, have you had any of the following symptoms?  no

## 2020-06-17 NOTE — PATIENT INSTRUCTIONS
Atrial Fibrillation: Care Instructions Your Care Instructions Atrial fibrillation is an irregular and often fast heartbeat. Treating this condition is important for several reasons. It can cause blood clots, which can travel from your heart to your brain and cause a stroke. If you have a fast heartbeat, you may feel lightheaded, dizzy, and weak. An irregular heartbeat can also increase your risk for heart failure. Atrial fibrillation is often the result of another heart condition, such as high blood pressure or coronary artery disease. Making changes to improve your heart condition will help you stay healthy and active. Follow-up care is a key part of your treatment and safety. Be sure to make and go to all appointments, and call your doctor if you are having problems. It's also a good idea to know your test results and keep a list of the medicines you take. How can you care for yourself at home? Medicines · Take your medicines exactly as prescribed. Call your doctor if you think you are having a problem with your medicine. You will get more details on the specific medicines your doctor prescribes. · If your doctor has given you a blood thinner to prevent a stroke, be sure you get instructions about how to take your medicine safely. Blood thinners can cause serious bleeding problems. · Do not take any vitamins, over-the-counter drugs, or herbal products without talking to your doctor first. 
Lifestyle changes · Do not smoke. Smoking can increase your chance of a stroke and heart attack. If you need help quitting, talk to your doctor about stop-smoking programs and medicines. These can increase your chances of quitting for good. · Eat a heart-healthy diet. · Stay at a healthy weight. Lose weight if you need to. · Limit alcohol to 2 drinks a day for men and 1 drink a day for women. Too much alcohol can cause health problems. · Avoid colds and flu. Get a pneumococcal vaccine shot.  If you have had one before, ask your doctor whether you need another dose. Get a flu shot every year. If you must be around people with colds or flu, wash your hands often. Activity · If your doctor recommends it, get more exercise. Walking is a good choice. Bit by bit, increase the amount you walk every day. Try for at least 30 minutes on most days of the week. You also may want to swim, bike, or do other activities. Your doctor may suggest that you join a cardiac rehabilitation program so that you can have help increasing your physical activity safely. · Start light exercise if your doctor says it is okay. Even a small amount will help you get stronger, have more energy, and manage stress. Walking is an easy way to get exercise. Start out by walking a little more than you did in the hospital. Gradually increase the amount you walk. · When you exercise, watch for signs that your heart is working too hard. You are pushing too hard if you cannot talk while you are exercising. If you become short of breath or dizzy or have chest pain, sit down and rest immediately. · Check your pulse regularly. Place two fingers on the artery at the palm side of your wrist, in line with your thumb. If your heartbeat seems uneven or fast, talk to your doctor. When should you call for help? [id number] anytime you think you may need emergency care. For example, call if: 
· You have symptoms of a heart attack. These may include: 
? Chest pain or pressure, or a strange feeling in the chest. 
? Sweating. ? Shortness of breath. ? Nausea or vomiting. ? Pain, pressure, or a strange feeling in the back, neck, jaw, or upper belly or in one or both shoulders or arms. ? Lightheadedness or sudden weakness. ? A fast or irregular heartbeat. After you call 911, the  may tell you to chew 1 adult-strength or 2 to 4 low-dose aspirin. Wait for an ambulance. Do not try to drive yourself. · You have symptoms of a stroke. These may include: ? Sudden numbness, tingling, weakness, or loss of movement in your face, arm, or leg, especially on only one side of your body. ? Sudden vision changes. ? Sudden trouble speaking. ? Sudden confusion or trouble understanding simple statements. ? Sudden problems with walking or balance. ? A sudden, severe headache that is different from past headaches. · You passed out (lost consciousness). Call your doctor now or seek immediate medical care if: 
· You have new or increased shortness of breath. · You feel dizzy or lightheaded, or you feel like you may faint. · Your heart rate becomes irregular. · You can feel your heart flutter in your chest or skip heartbeats. Tell your doctor if these symptoms are new or worse. Watch closely for changes in your health, and be sure to contact your doctor if you have any problems. Where can you learn more? Go to http://rayray-bonifacio.info/ Enter U020 in the search box to learn more about \"Atrial Fibrillation: Care Instructions. \" Current as of: December 16, 2019               Content Version: 12.5 © 5400-1946 [employer]. Care instructions adapted under license by [employer] (which disclaims liability or warranty for this information). If you have questions about a medical condition or this instruction, always ask your healthcare professional. [address] any warranty or liability for your use of this information. AQS Activation Thank you for requesting access to AQS. Please follow the instructions below to securely access and download your online medical record. AQS allows you to send messages to your doctor, view your test results, renew your prescriptions, schedule appointments, and more. How Do I Sign Up? 1. In your internet browser, go to [email or web address]. 2. Click on the First Time User? Click Here link in the Sign In box.  You will see the New Member Sign Up page. 3. Enter your [employer] Access Code exactly as it appears below. You will not need to use this code after youve completed the sign-up process. If you do not sign up before the expiration date, you must request a new code. [employer] Access Code: [email or web address] Expires: 2020  7:46 AM (This is the date your [employer] access code will ) 4. Enter the last four digits of your Social Security Number (xxxx) and Date of Birth (mm/dd/yyyy) as indicated and click Submit. You will be taken to the next sign-up page. 5. Create a Trunk Showt ID. This will be your [employer] login ID and cannot be changed, so think of one that is secure and easy to remember. 6. Create a [employer] password. You can change your password at any time. 7. Enter your Password Reset Question and Answer. This can be used at a later time if you forget your password. 8. Enter your e-mail address. You will receive e-mail notification when new information is available in [address] 9. Click Sign Up. You can now view and download portions of your medical record. 10. Click the Download Summary menu link to download a portable copy of your medical information. Additional Information If you have questions, please visit the Frequently Asked Questions section of the [employer] website at [email or web address] Remember, [employer] is NOT to be used for urgent needs. For medical emergencies, dial 911.

## 2020-06-17 NOTE — PROGRESS NOTES
HISTORY OF PRESENT ILLNESS  Valdemar Dominguez is a 69 y.o. male. Palpitations    The history is provided by the patient. This is a chronic problem. The current episode started more than 1 week ago. The problem has been rapidly improving. The problem occurs rarely. Associated symptoms include shortness of breath. Pertinent negatives include no diaphoresis, no fever, no claudication, no near-syncope, no orthopnea, no PND, no syncope, no nausea, no vomiting, no leg pain, no dizziness, no weakness, no cough, no hemoptysis and no sputum production. Risk factors include dyslipidemia, hypertension and male gender. His past medical history is significant for atrial fibrillation. His past medical history does not include hyperthyroidism. Shortness of Breath   The history is provided by the patient. This is a chronic problem. The problem occurs intermittently. The current episode started more than 1 week ago. The problem has not changed since onset. Pertinent negatives include no fever, no ear pain, no neck pain, no cough, no sputum production, no hemoptysis, no wheezing, no PND, no orthopnea, no syncope, no vomiting, no rash, no leg pain, no leg swelling and no claudication. He has had prior hospitalizations. Associated medical issues do not include chronic lung disease, CAD or heart failure. Review of Systems   Constitutional: Negative for chills, diaphoresis, fever and weight loss. HENT: Negative for ear pain and hearing loss. Eyes: Negative for blurred vision. Respiratory: Positive for shortness of breath. Negative for cough, hemoptysis, sputum production, wheezing and stridor. Cardiovascular: Positive for palpitations. Negative for orthopnea, claudication, leg swelling, syncope, PND and near-syncope. Gastrointestinal: Negative for heartburn, nausea and vomiting. Musculoskeletal: Negative for myalgias and neck pain. Skin: Negative for rash.    Neurological: Negative for dizziness, tingling, tremors, focal weakness, loss of consciousness and weakness. Psychiatric/Behavioral: Negative for depression and suicidal ideas. Family History   Problem Relation Age of Onset    Hypertension Mother     Heart Attack Father     Pacemaker Father        Past Medical History:   Diagnosis Date    AF (atrial fibrillation) ([facility] [address] 9/3/09    Atrial flutter (HCC)     BPH (benign prostatic hypertrophy) 9/3/09    Carotid duplex 05    No stenosis bilaterally     Diabetes mellitus, type 2 ([facility] [address]     Echocardiogram 10/27/2011    EF 60-65%. Gr 1 DDfx. RVSP 30 mmHg. Mild LAE. Mild MR. Diastolic doming of mitral valve. Mild IVCE.     Elevated PSA     Hypercholesterolemia     dislipidemia    Hypertension     Hypothyroidism     on replacement therapy    Long term (current) use of anticoagulants 3/16/2011    Lower extremity arterial duplex 09    No evidence of arterial insufficiency bilaterally at rest    Paroxysmal atrial fibrillation (HCC)     Personal history of urinary calculi 9/3/09    Stress thallium 08    Very small scarring in basal inferior wall w/min ischemia likely artifact; EF 68%; rapid AF during exercise; EKG portion negative which was terminated prematurely because of the persistent rapid AF       Past Surgical History:   Procedure Laterality Date    ABDOMEN SURGERY [address]    Colon surgical repair following puncture during removal of polyps    HX HERNIA REPAIR      , left; , right    HX LITHOTRIPSY  10/2004    Removed kidney stones    HX OTHER SURGICAL  2015    Scar tissue removed from bowels    HX TONSILLECTOMY      age 12       Social History     Tobacco Use    Smoking status: Former Smoker     Packs/day: 1.50     Years: 35.00     Pack years: 52.50     Last attempt to quit: 10/11/1980     Years since quittin.7    Smokeless tobacco: Never Used   Substance Use Topics    Alcohol use: No       Allergies   Allergen Reactions    Iodinated Contrast Media Unknown (comments)     States no allergy to iodine    Other Medication Unknown (comments)     Anesthesia (nausea)       Outpatient Medications Marked as Taking for the 6/17/20 encounter (Office Visit) with Rose Hawkins MD   Medication Sig Dispense Refill    cholecalciferol (VITAMIN D3) (2,000 UNITS /50 MCG) cap capsule Take  by mouth two (2) times a day.  metoprolol succinate (TOPROL-XL) 25 mg XL tablet TAKE (1) TABLET TWICE A  Tab 3    warfarin (COUMADIN) 5 mg tablet Take (2) tabs altnerating with (1 1/2) tab every other evening. 105 Tab 0    insulin lispro protamin/lispro (HUMALOG MIX 50-50 SC) by SubCUTAneous route.  BD VEO INSULIN SYRINGE UF 1/2 mL 31 gauge x 15/64\" syrg       dutasteride (AVODART) 0.5 mg capsule Take 1 capsule twice a week. 30 Cap 3    warfarin (COUMADIN) 5 mg tablet Take (2) tabs altnerating with (1 1/2) tab every other evening. 105 Tab 0    digoxin (DIGOX) 0.125 mg tablet TAKE 1 TABLET ONCE DAILY FOR HEART 90 Tab 3    warfarin (COUMADIN) 5 mg tablet Alternate 10mg and 7.5mg every evening or as directed by physician. 120 Tab 0    levothyroxine (SYNTHROID) 112 mcg tablet       HUMALOG MIX 50-50 INSULN U-100 100 unit/mL (50-50) injection       lispro-protamine & lispro (HUMALOG MIX 50-50) 100 unit/mL (50-50) flexpen by SubCUTAneous route two (2) times a day. 22 units Q AM, 10 units QPM           Visit Vitals  /81   Pulse 90   Ht 5' 9\" (1.753 m)   Wt 72.6 kg (160 lb)   BMI 23.63 kg/m²     Physical Exam   Constitutional: He is oriented to person, place, and time. He appears well-developed and well-nourished. No distress. HENT:   Head: Atraumatic. Mouth/Throat: No oropharyngeal exudate. Eyes: Conjunctivae are normal. No scleral icterus. Neck: Neck supple. No JVD present. Cardiovascular: Normal rate and regular rhythm. Exam reveals no gallop. Murmur (3/6 holosystolic murmur best heard at mitral area with no radiation) heard.   Pulmonary/Chest: Effort normal and breath sounds normal. No stridor. He has no wheezes. He has no rales. Abdominal: Soft. There is no abdominal tenderness. There is no rebound. Musculoskeletal: Normal range of motion. General: No tenderness or edema. Neurological: He is alert and oriented to person, place, and time. He exhibits normal muscle tone. Skin: Skin is warm. He is not diaphoretic. Psychiatric: He has a normal mood and affect. His behavior is normal.     ekg atrial fibrillaton with controlled ventricular response. 04/09/19   ECHO ADULT COMPLETE 04/11/2019 4/11/2019    Narrative · Estimated left ventricular ejection fraction is 61 - 65%. No regional   wall motion abnormality noted. Moderate (grade 2) left ventricular   diastolic dysfunction. · Left atrial cavity size is moderately dilated. · Right ventricular cavity size is mildly dilated. · Right atrial cavity size is moderately dilated. · Aortic valve leaflet calcification present with reduced excursion. Mild   aortic valve stenosis is present. Mild aortic valve regurgitation is   present. · Mitral valve thickening. Mild to moderate mitral valve regurgitation. · Mild to moderate tricuspid valve regurgitation is present. There is no   evidence of pulmonary hypertension. · Mild pulmonic valve regurgitation is present. · Mild aortic root (3.9 cm) and ascending aorta (4.2 cm) dilatation. · Mildly elevated central venous pressure (5-10 mmHg); IVC diameter is   less than 21 mm and collapses less than 50% with respiration. Signed by: Johnson Modi MD     ASSESSMENT and PLAN    ICD-10-CM ICD-9-CM    1. Atrial fibrillation, unspecified type ([address] I48.91 427.31 AMB POC EKG ROUTINE W/ 12 LEADS, INTER & REP   2. Moderate mitral regurgitation I34.0 424.0 ECHO ADULT COMPLETE   3. Hypertension I11.9 402.10    4. Hypothyroidism due to acquired atrophy of thyroid E03.4 244.8      246.8    5.  Type 2 diabetes mellitus without complication, unspecified whether long term insulin use (HCC) E11.9 250.00      Follow-up and Dispositions    · Return in about 6 months (around 12/17/2020). Patient on lopressor and digoxin for rate control. Continue coumadin for anticoagulation. Has mild to moderate mitral regurgitation with mildly enlarged chamber sizes. will repeat echo to assess VHD. No new symptoms since the last visit-   Follow up in 6months.

## 2020-06-29 ENCOUNTER — TELEPHONE ANTICOAG (OUTPATIENT)
Dept: CARDIOLOGY CLINIC | Age: 75
End: 2020-06-29

## 2020-06-29 DIAGNOSIS — I48.91 ATRIAL FIBRILLATION, UNSPECIFIED TYPE (HCC): ICD-10-CM

## 2020-06-29 LAB — INR, EXTERNAL: 2.2

## 2020-06-29 NOTE — PATIENT INSTRUCTIONS
Mr. Parisa Melchor is here today for anticoagulation monitoring for the diagnosis of Atrial Fibrillation. His INR goal is 2.0-3.0 and his current Coumadin dose is 5 mg on Monday & Thursday and 2.5 mg all other days. Today's findings include an INR of 2.2     Considering Mr. Daigle's past history, todays findings, and per the coumadin policy/protocol, Mr. Verenice Wick was instructed to take Coumadin as follows,  Continue taking 5 mg on Monday & Thursday and 2.5 mg all other days. He was also instructed to come back in 2 weeks for an INR check. A full discussion of the nature of anticoagulants has been carried out. A full discussion of the need for frequent and regular monitoring, precise dosage adjustment and compliance was stressed. Side effects of potential bleeding were discussed and Mr. Verenice Wick was instructed to call [phone] if there are any signs of abnormal bleeding. Mr. Verenice Wick was instructed to avoid any OTC items containing aspirin or ibuprofen and prior to starting any new OTC products to consult with his physician or pharmacist to ensure no drug interactions are present. Mr. Verenice Wick was instructed to avoid any major changes in his general diet and to avoid alcohol consumption. .      Mr. Verenice Wick verbalized his understanding of all instructions and will call the office with any questions, concerns, or signs of abnormal bleeding or blood clot.

## 2020-07-08 DIAGNOSIS — I48.0 PAROXYSMAL ATRIAL FIBRILLATION (HCC): Primary | ICD-10-CM

## 2020-07-08 DIAGNOSIS — I48.92 ATRIAL FLUTTER, UNSPECIFIED TYPE (HCC): ICD-10-CM

## 2020-07-09 RX ORDER — DIGOXIN 125 MCG
TABLET ORAL
Qty: 90 TAB | Refills: 0 | Status: SHIPPED | OUTPATIENT
Start: 2020-07-09 | End: 2020-10-09

## 2020-07-13 ENCOUNTER — TELEPHONE ANTICOAG (OUTPATIENT)
Dept: CARDIOLOGY CLINIC | Age: 75
End: 2020-07-13

## 2020-07-13 DIAGNOSIS — I48.91 ATRIAL FIBRILLATION, UNSPECIFIED TYPE (HCC): ICD-10-CM

## 2020-07-13 LAB — INR, EXTERNAL: 2.1

## 2020-07-13 NOTE — PATIENT INSTRUCTIONS
Mr. Farheen Herrera is here today for anticoagulation monitoring for the diagnosis of Atrial Fibrillation. His INR goal is 2.0-3.0 and his current Coumadin dose is 5 mg on Monday and thursday ans 2.5 AOD. Today's findings include an INR of 2.1 Considering Mr. Daigle's past history, todays findings, and per the coumadin policy/protocol, Mr. Claudeen Achilles was instructed to take Coumadin as follows,  Resume current dose of coumadin. He was also instructed to come back in 3 weeks for an INR check. A full discussion of the nature of anticoagulants has been carried out. A full discussion of the need for frequent and regular monitoring, precise dosage adjustment and compliance was stressed. Side effects of potential bleeding were discussed and Mr. Claudeen Achilles was instructed to call [phone] if there are any signs of abnormal bleeding. Mr. Claudeen Achilles was instructed to avoid any OTC items containing aspirin or ibuprofen and prior to starting any new OTC products to consult with his physician or pharmacist to ensure no drug interactions are present. Mr. Claudeen Achilles was instructed to avoid any major changes in his general diet and to avoid alcohol consumption. . 
 
 
Mr. Claudeen Achilles verbalized his understanding of all instructions and will call the office with any questions, concerns, or signs of abnormal bleeding or blood clot.

## 2020-07-27 ENCOUNTER — TELEPHONE (OUTPATIENT)
Dept: CARDIOLOGY CLINIC | Age: 75
End: 2020-07-27

## 2020-07-27 NOTE — TELEPHONE ENCOUNTER
Patient needs to have some teeth extracted and patient is on coumadin. They would like to know when patient can stop the coumadin and for how long and when to resume. Dentist has started patient on an antibiotic and then will pull teeth. Please Advise.

## 2020-07-27 NOTE — TELEPHONE ENCOUNTER
Ok to hold anticoagulation for 5 days can use asa in the mean time.  resume anticoagulation asap after procedure per surgeon recommendations

## 2020-08-03 ENCOUNTER — TELEPHONE ANTICOAG (OUTPATIENT)
Dept: CARDIOLOGY CLINIC | Age: 75
End: 2020-08-03

## 2020-08-03 DIAGNOSIS — I48.91 ATRIAL FIBRILLATION, UNSPECIFIED TYPE (HCC): ICD-10-CM

## 2020-08-03 LAB — INR, EXTERNAL: 1.8

## 2020-08-03 NOTE — PATIENT INSTRUCTIONS
Mr. Jairo Montalvo is here today for anticoagulation monitoring for the diagnosis of Atrial Fibrillation. His INR goal is 2.0-3.0 and his current Coumadin dose is 5 mg on Monday and Thursday and 2.5 mg all other days. Today's findings include an INR of 1.8     Considering Mr. Daigle's past history, todays findings, and per the coumadin policy/protocol, Mr. Jenna Logan was instructed to take Coumadin as follows,  Take 5 mg tonight then resume 2.5 mg all other days except 5 mg on Monday and Thursday. He was also instructed to come back in 2 weeks for an INR check. A full discussion of the nature of anticoagulants has been carried out. A full discussion of the need for frequent and regular monitoring, precise dosage adjustment and compliance was stressed. Side effects of potential bleeding were discussed and Mr. Jenna Logan was instructed to call [phone] if there are any signs of abnormal bleeding. Mr. Jenna Logan was instructed to avoid any OTC items containing aspirin or ibuprofen and prior to starting any new OTC products to consult with his physician or pharmacist to ensure no drug interactions are present. Mr. Jenna Logan was instructed to avoid any major changes in his general diet and to avoid alcohol consumption. .      Mr. Jenna Logan verbalized his understanding of all instructions and will call the office with any questions, concerns, or signs of abnormal bleeding or blood clot.

## 2020-08-17 ENCOUNTER — TELEPHONE ANTICOAG (OUTPATIENT)
Dept: CARDIOLOGY CLINIC | Age: 75
End: 2020-08-17

## 2020-08-17 DIAGNOSIS — I48.91 ATRIAL FIBRILLATION, UNSPECIFIED TYPE (HCC): ICD-10-CM

## 2020-08-17 LAB
INR, EXTERNAL: 1.6
INR, EXTERNAL: 1.6

## 2020-08-18 ENCOUNTER — TELEPHONE ANTICOAG (OUTPATIENT)
Dept: CARDIOLOGY CLINIC | Age: 75
End: 2020-08-18

## 2020-08-18 NOTE — PROGRESS NOTES
Patient stated he has been off coumadin since Saturday August 15 and he is tooth extraction Thursday August 20. He stated that is why is levels are the way they are.

## 2020-08-24 ENCOUNTER — TELEPHONE ANTICOAG (OUTPATIENT)
Dept: CARDIOLOGY CLINIC | Age: 75
End: 2020-08-24

## 2020-08-24 DIAGNOSIS — I48.91 ATRIAL FIBRILLATION, UNSPECIFIED TYPE (HCC): ICD-10-CM

## 2020-08-24 LAB — INR, EXTERNAL: 1.2

## 2020-08-24 NOTE — PATIENT INSTRUCTIONS
Mr. Sera Jean is here today for anticoagulation monitoring for the diagnosis of Atrial Fibrillation. His INR goal is 2.0-3.0 and his current Coumadin dose is Alternate 10 mg and 5 mg every 2 days. Today's findings include an INR of 1.2 Considering Mr. Daigle's past history, todays findings, and per the coumadin policy/protocol, Mr. Danny Stark was instructed to take Coumadin as follows,  10 mg daily. He was also instructed to come back in 1 weeks for an INR check. A full discussion of the nature of anticoagulants has been carried out. A full discussion of the need for frequent and regular monitoring, precise dosage adjustment and compliance was stressed. Side effects of potential bleeding were discussed and Mr. Danny Stark was instructed to call [phone] if there are any signs of abnormal bleeding. Mr. Danny Stark was instructed to avoid any OTC items containing aspirin or ibuprofen and prior to starting any new OTC products to consult with his physician or pharmacist to ensure no drug interactions are present. Mr. Danny Stark was instructed to avoid any major changes in his general diet and to avoid alcohol consumption. . 
 
 
Mr. Danny Stark verbalized his understanding of all instructions and will call the office with any questions, concerns, or signs of abnormal bleeding or blood clot.

## 2020-08-31 ENCOUNTER — TELEPHONE ANTICOAG (OUTPATIENT)
Dept: CARDIOLOGY CLINIC | Age: 75
End: 2020-08-31

## 2020-08-31 DIAGNOSIS — I48.91 ATRIAL FIBRILLATION, UNSPECIFIED TYPE (HCC): ICD-10-CM

## 2020-08-31 LAB — INR, EXTERNAL: 1.8

## 2020-08-31 NOTE — PATIENT INSTRUCTIONS
Mr. Eleanor Hagen is here today for anticoagulation monitoring for the diagnosis of Atrial Fibrillation. His INR goal is 2.0-3.0 and his current Coumadin dose is 10 mg daily. Today's findings include an INR of 1.8     Considering Mr. Daigle's past history, todays findings, and per the coumadin policy/protocol, Mr. Joshua Ventura was instructed to take Coumadin as follows,  Continue taking 10 mg daily with compliance. He was also instructed to come back in 1 weeks for an INR check. A full discussion of the nature of anticoagulants has been carried out. A full discussion of the need for frequent and regular monitoring, precise dosage adjustment and compliance was stressed. Side effects of potential bleeding were discussed and Mr. Joshua Ventura was instructed to call [phone] if there are any signs of abnormal bleeding. Mr. Joshua Ventura was instructed to avoid any OTC items containing aspirin or ibuprofen and prior to starting any new OTC products to consult with his physician or pharmacist to ensure no drug interactions are present. Mr. Joshua Ventura was instructed to avoid any major changes in his general diet and to avoid alcohol consumption. .      Mr. Joshua Ventura verbalized his understanding of all instructions and will call the office with any questions, concerns, or signs of abnormal bleeding or blood clot.

## 2020-09-08 ENCOUNTER — TELEPHONE ANTICOAG (OUTPATIENT)
Dept: CARDIOLOGY CLINIC | Age: 75
End: 2020-09-08

## 2020-09-08 DIAGNOSIS — I48.92 ATRIAL FLUTTER, UNSPECIFIED TYPE (HCC): ICD-10-CM

## 2020-09-08 DIAGNOSIS — I48.91 ATRIAL FIBRILLATION, UNSPECIFIED TYPE (HCC): ICD-10-CM

## 2020-09-08 LAB — INR, EXTERNAL: 3.4

## 2020-09-08 NOTE — PATIENT INSTRUCTIONS
Mr. Vonda Sparks is here today for anticoagulation monitoring for the diagnosis of Atrial Fibrillation. His INR goal is 2.0-3.0 and his current Coumadin dose is 10 mg daily. Today's findings include an INR of 3.4 Considering Mr. Daigle's past history, todays findings, and per the coumadin policy/protocol, Mr. Abrahan Larson was instructed to take Coumadin as follows,  Take 2.5 mg tonight then resume 10 mg daily He was also instructed to come back in 1 weeks for an INR check. A full discussion of the nature of anticoagulants has been carried out. A full discussion of the need for frequent and regular monitoring, precise dosage adjustment and compliance was stressed. Side effects of potential bleeding were discussed and Mr. Abrahan Larson was instructed to call [phone] if there are any signs of abnormal bleeding. Mr. Abrahan Larson was instructed to avoid any OTC items containing aspirin or ibuprofen and prior to starting any new OTC products to consult with his physician or pharmacist to ensure no drug interactions are present. Mr. Abrahan Larson was instructed to avoid any major changes in his general diet and to avoid alcohol consumption. . 
 
 
Mr. Abrahan Larson verbalized his understanding of all instructions and will call the office with any questions, concerns, or signs of abnormal bleeding or blood clot.

## 2020-09-09 RX ORDER — WARFARIN SODIUM 5 MG/1
TABLET ORAL
Qty: 105 TAB | Refills: 0 | Status: SHIPPED | OUTPATIENT
Start: 2020-09-09 | End: 2022-08-08

## 2020-09-09 RX ORDER — WARFARIN SODIUM 5 MG/1
TABLET ORAL
Qty: 105 TAB | Refills: 0 | Status: SHIPPED | OUTPATIENT
Start: 2020-09-09 | End: 2021-01-07

## 2020-09-14 ENCOUNTER — TELEPHONE ANTICOAG (OUTPATIENT)
Dept: CARDIOLOGY CLINIC | Age: 75
End: 2020-09-14

## 2020-09-14 DIAGNOSIS — I48.91 ATRIAL FIBRILLATION, UNSPECIFIED TYPE (HCC): ICD-10-CM

## 2020-09-14 LAB — INR, EXTERNAL: 3.3

## 2020-09-14 NOTE — PATIENT INSTRUCTIONS
Mr. Lonnie Snow is here today for anticoagulation monitoring for the diagnosis of Atrial Fibrillation. His INR goal is 2.0-3.0 and his current Coumadin dose is 10 MG DAILY. Today's findings include an INR of 3.3    Considering Mr. Daigle's past history, todays findings, and per the coumadin policy/protocol, Mr. Jena Sanches was instructed to take Coumadin as follows,  TAKE 7.5 MG ON Monday AND Thursday AND 10 MG AOD. He was also instructed to come back in 1 weeks for an INR check. A full discussion of the nature of anticoagulants has been carried out. A full discussion of the need for frequent and regular monitoring, precise dosage adjustment and compliance was stressed. Side effects of potential bleeding were discussed and Mr. Jena Sanches was instructed to call [phone] if there are any signs of abnormal bleeding. Mr. Jena Sanches was instructed to avoid any OTC items containing aspirin or ibuprofen and prior to starting any new OTC products to consult with his physician or pharmacist to ensure no drug interactions are present. Mr. Jena Sanches was instructed to avoid any major changes in his general diet and to avoid alcohol consumption. .      Mr. Jena Sanches verbalized his understanding of all instructions and will call the office with any questions, concerns, or signs of abnormal bleeding or blood clot.

## 2020-09-21 ENCOUNTER — TELEPHONE ANTICOAG (OUTPATIENT)
Dept: CARDIOLOGY CLINIC | Age: 75
End: 2020-09-21

## 2020-09-21 DIAGNOSIS — I48.91 ATRIAL FIBRILLATION, UNSPECIFIED TYPE (HCC): ICD-10-CM

## 2020-09-21 LAB — INR, EXTERNAL: 3.8

## 2020-09-21 NOTE — PATIENT INSTRUCTIONS
Mr. Paul Rojas is here today for anticoagulation monitoring for the diagnosis of Atrial Fibrillation. His INR goal is 2.0-3.0 and his current Coumadin dose is Alternating 7.5 mg and 10 mg every 2 days. Today's findings include an INR of 3.8    Considering Mr. Daigle's past history, todays findings, and per the coumadin policy/protocol, Mr. Venkata Wilson was instructed to take Coumadin as follows,  Resume current dose of coumadin. He was also instructed to come back in 1 weeks for an INR check. A full discussion of the nature of anticoagulants has been carried out. A full discussion of the need for frequent and regular monitoring, precise dosage adjustment and compliance was stressed. Side effects of potential bleeding were discussed and Mr. Venkata Wilson was instructed to call [phone] if there are any signs of abnormal bleeding. Mr. Venkata Wilson was instructed to avoid any OTC items containing aspirin or ibuprofen and prior to starting any new OTC products to consult with his physician or pharmacist to ensure no drug interactions are present. Mr. Venkata Wilson was instructed to avoid any major changes in his general diet and to avoid alcohol consumption. .      Mr. Venkata Wilson verbalized his understanding of all instructions and will call the office with any questions, concerns, or signs of abnormal bleeding or blood clot.

## 2020-09-28 LAB — INR, EXTERNAL: 2.4

## 2020-09-29 ENCOUNTER — TELEPHONE ANTICOAG (OUTPATIENT)
Dept: CARDIOLOGY CLINIC | Age: 75
End: 2020-09-29

## 2020-09-29 DIAGNOSIS — I48.91 ATRIAL FIBRILLATION, UNSPECIFIED TYPE (HCC): ICD-10-CM

## 2020-09-29 NOTE — PATIENT INSTRUCTIONS
Mr. Lesli Mai is here today for anticoagulation monitoring for the diagnosis of Atrial Fibrillation. His INR goal is 2.0-3.0 and his current Coumadin dose is alternating 7.5 mg and 10 mg every 2 days. Today's findings include an INR of 2.4 Considering Mr. Daigle's past history, todays findings, and per the coumadin policy/protocol, Mr. Dimple Stone was instructed to take Coumadin as follows,  Resume current dose of coumadin. He was also instructed to come back in 2 weeks for an INR check. A full discussion of the nature of anticoagulants has been carried out. A full discussion of the need for frequent and regular monitoring, precise dosage adjustment and compliance was stressed. Side effects of potential bleeding were discussed and Mr. Dimple Stone was instructed to call [phone] if there are any signs of abnormal bleeding. Mr. Dimple Stone was instructed to avoid any OTC items containing aspirin or ibuprofen and prior to starting any new OTC products to consult with his physician or pharmacist to ensure no drug interactions are present. Mr. Dimple Stone was instructed to avoid any major changes in his general diet and to avoid alcohol consumption. . 
 
 
Mr. Dimple Stone verbalized his understanding of all instructions and will call the office with any questions, concerns, or signs of abnormal bleeding or blood clot.

## 2020-10-09 DIAGNOSIS — I48.92 ATRIAL FLUTTER, UNSPECIFIED TYPE (HCC): ICD-10-CM

## 2020-10-09 RX ORDER — DIGOXIN 125 MCG
TABLET ORAL
Qty: 90 TAB | Refills: 3 | Status: SHIPPED | OUTPATIENT
Start: 2020-10-09 | End: 2021-10-11

## 2020-10-09 NOTE — ADDENDUM NOTE
Addended by: Moses Shah on: 10/9/2020 02:24 PM     Modules accepted: Orders VOICEMAIL  1. Caller Name: Sigifredo                      Call Back Number: [phone]    2. Message: Patient was just released from the hospital and the [facility] is asking the Home Health referral be changed to [facility] since she works there. Please advise.    3. Patient approves office to leave a detailed voicemail/MyChart message: N\A

## 2020-10-13 ENCOUNTER — TELEPHONE ANTICOAG (OUTPATIENT)
Dept: CARDIOLOGY CLINIC | Age: 75
End: 2020-10-13

## 2020-10-13 DIAGNOSIS — I48.91 ATRIAL FIBRILLATION, UNSPECIFIED TYPE (HCC): ICD-10-CM

## 2020-10-13 LAB — INR, EXTERNAL: 2.5

## 2020-10-13 NOTE — PATIENT INSTRUCTIONS
Mr. Enoc Massey is here today for anticoagulation monitoring for the diagnosis of Atrial Fibrillation. His INR goal is 2.0-3.0 and his current Coumadin dose is 7.5 mg alt 10 mg daily. Today's findings include an INR of 2.5     Considering Mr. Daigle's past history, todays findings, and per the coumadin policy/protocol, Mr. Romy Cotto was instructed to take Coumadin as follows,  Continue taking 7.5 mg alt 10 mg daily. He was also instructed to come back in 1 weeks for an INR check. A full discussion of the nature of anticoagulants has been carried out. A full discussion of the need for frequent and regular monitoring, precise dosage adjustment and compliance was stressed. Side effects of potential bleeding were discussed and Mr. Romy Cotto was instructed to call [phone] if there are any signs of abnormal bleeding. Mr. Romy Cotto was instructed to avoid any OTC items containing aspirin or ibuprofen and prior to starting any new OTC products to consult with his physician or pharmacist to ensure no drug interactions are present. Mr. Romy Cotto was instructed to avoid any major changes in his general diet and to avoid alcohol consumption. .      Mr. Romy Cotto verbalized his understanding of all instructions and will call the office with any questions, concerns, or signs of abnormal bleeding or blood clot.

## 2020-10-20 ENCOUNTER — TELEPHONE ANTICOAG (OUTPATIENT)
Dept: CARDIOLOGY CLINIC | Age: 75
End: 2020-10-20

## 2020-10-20 DIAGNOSIS — I48.91 ATRIAL FIBRILLATION, UNSPECIFIED TYPE (HCC): ICD-10-CM

## 2020-10-20 LAB — INR, EXTERNAL: 2.7

## 2020-10-20 NOTE — PATIENT INSTRUCTIONS
Mr. Racheal Combs is here today for anticoagulation monitoring for the diagnosis of Atrial Fibrillation. His INR goal is 2.0-3.0 and his current Coumadin dose is . Alternating 7.5 mg  and 10 mg every 2 days    Today's findings include an INR of 2.7    Considering Mr. Daigle's past history, todays findings, and per the coumadin policy/protocol, Mr. Linda Salazar was instructed to take Coumadin as follows,  Resume current dose of coumadin. He was also instructed to come back in 2 weeks for an INR check. A full discussion of the nature of anticoagulants has been carried out. A full discussion of the need for frequent and regular monitoring, precise dosage adjustment and compliance was stressed. Side effects of potential bleeding were discussed and Mr. Linda Salazar was instructed to call [phone] if there are any signs of abnormal bleeding. Mr. Linda Salazar was instructed to avoid any OTC items containing aspirin or ibuprofen and prior to starting any new OTC products to consult with his physician or pharmacist to ensure no drug interactions are present. Mr. Linda Salazar was instructed to avoid any major changes in his general diet and to avoid alcohol consumption. .      Mr. Linda Salazar verbalized his understanding of all instructions and will call the office with any questions, concerns, or signs of abnormal bleeding or blood clot.

## 2020-11-03 ENCOUNTER — TELEPHONE ANTICOAG (OUTPATIENT)
Dept: CARDIOLOGY CLINIC | Age: 75
End: 2020-11-03

## 2020-11-03 DIAGNOSIS — I48.91 ATRIAL FIBRILLATION, UNSPECIFIED TYPE (HCC): ICD-10-CM

## 2020-11-03 LAB — INR, EXTERNAL: 2.4

## 2020-11-03 NOTE — PATIENT INSTRUCTIONS
Mr. Elonda Brunner is here today for anticoagulation monitoring for the diagnosis of Atrial Fibrillation. His INR goal is 2.0-3.0 and his current Coumadin dose is Alternating 7.5 mg and 10 mg every 2 days. Today's findings include an INR of 2.4    Considering Mr. Daigle's past history, todays findings, and per the coumadin policy/protocol, Mr. Toño Odom was instructed to take Coumadin as follows,  Resume current dose of coumadin. He was also instructed to come back in 3 weeks for an INR check. A full discussion of the nature of anticoagulants has been carried out. A full discussion of the need for frequent and regular monitoring, precise dosage adjustment and compliance was stressed. Side effects of potential bleeding were discussed and Mr. Toño Odom was instructed to call [phone] if there are any signs of abnormal bleeding. Mr. Toño Odom was instructed to avoid any OTC items containing aspirin or ibuprofen and prior to starting any new OTC products to consult with his physician or pharmacist to ensure no drug interactions are present. Mr. Toño Odom was instructed to avoid any major changes in his general diet and to avoid alcohol consumption. .      Mr. Toño Odom verbalized his understanding of all instructions and will call the office with any questions, concerns, or signs of abnormal bleeding or blood clot.

## 2020-11-24 ENCOUNTER — TELEPHONE ANTICOAG (OUTPATIENT)
Dept: CARDIOLOGY CLINIC | Age: 75
End: 2020-11-24

## 2020-11-24 DIAGNOSIS — I48.91 ATRIAL FIBRILLATION, UNSPECIFIED TYPE (HCC): ICD-10-CM

## 2020-11-24 LAB — INR, EXTERNAL: 2.9

## 2020-11-24 NOTE — PATIENT INSTRUCTIONS
Mr. Yaneth Rodas is here today for anticoagulation monitoring for the diagnosis of Atrial Fibrillation. His INR goal is 2.0-3.0 and his current Coumadin dose is alternating 10 mg and 7.5 mg every 2 days. Today's findings include an INR of 2.9    Considering Mr. Daigle's past history, todays findings, and per the coumadin policy/protocol, Mr. Buffy Cisneros was instructed to take Coumadin as follows,  Resume current dose of coumadin. He was also instructed to come back in 4 weeks for an INR check. A full discussion of the nature of anticoagulants has been carried out. A full discussion of the need for frequent and regular monitoring, precise dosage adjustment and compliance was stressed. Side effects of potential bleeding were discussed and Mr. Buffy Cisneros was instructed to call [phone] if there are any signs of abnormal bleeding. Mr. Buffy Cisneros was instructed to avoid any OTC items containing aspirin or ibuprofen and prior to starting any new OTC products to consult with his physician or pharmacist to ensure no drug interactions are present. Mr. Buffy Cisneros was instructed to avoid any major changes in his general diet and to avoid alcohol consumption. .      Mr. Buffy Cisneros verbalized his understanding of all instructions and will call the office with any questions, concerns, or signs of abnormal bleeding or blood clot.

## 2020-12-02 ENCOUNTER — OFFICE VISIT (OUTPATIENT)
Dept: CARDIOLOGY CLINIC | Age: 75
End: 2020-12-02
Payer: MEDICARE

## 2020-12-02 VITALS
HEIGHT: 69 IN | SYSTOLIC BLOOD PRESSURE: 142 MMHG | WEIGHT: 164 LBS | DIASTOLIC BLOOD PRESSURE: 90 MMHG | BODY MASS INDEX: 24.29 KG/M2 | HEART RATE: 82 BPM | TEMPERATURE: 97.4 F

## 2020-12-02 DIAGNOSIS — I48.92 ATRIAL FLUTTER, UNSPECIFIED TYPE (HCC): ICD-10-CM

## 2020-12-02 DIAGNOSIS — I11.9 BENIGN HYPERTENSIVE HEART DISEASE WITHOUT HEART FAILURE: ICD-10-CM

## 2020-12-02 DIAGNOSIS — E03.4 HYPOTHYROIDISM DUE TO ACQUIRED ATROPHY OF THYROID: ICD-10-CM

## 2020-12-02 DIAGNOSIS — E78.5 DYSLIPIDEMIA: ICD-10-CM

## 2020-12-02 DIAGNOSIS — I34.0 MODERATE MITRAL REGURGITATION: Primary | ICD-10-CM

## 2020-12-02 DIAGNOSIS — E11.9 TYPE 2 DIABETES MELLITUS WITHOUT COMPLICATION, UNSPECIFIED WHETHER LONG TERM INSULIN USE (HCC): ICD-10-CM

## 2020-12-02 PROCEDURE — 99214 OFFICE O/P EST MOD 30 MIN: CPT | Performed by: INTERNAL MEDICINE

## 2020-12-02 PROCEDURE — G8536 NO DOC ELDER MAL SCRN: HCPCS | Performed by: INTERNAL MEDICINE

## 2020-12-02 PROCEDURE — G8428 CUR MEDS NOT DOCUMENT: HCPCS | Performed by: INTERNAL MEDICINE

## 2020-12-02 PROCEDURE — G8432 DEP SCR NOT DOC, RNG: HCPCS | Performed by: INTERNAL MEDICINE

## 2020-12-02 PROCEDURE — 3046F HEMOGLOBIN A1C LEVEL >9.0%: CPT | Performed by: INTERNAL MEDICINE

## 2020-12-02 PROCEDURE — 2022F DILAT RTA XM EVC RTNOPTHY: CPT | Performed by: INTERNAL MEDICINE

## 2020-12-02 PROCEDURE — 1101F PT FALLS ASSESS-DOCD LE1/YR: CPT | Performed by: INTERNAL MEDICINE

## 2020-12-02 PROCEDURE — 3017F COLORECTAL CA SCREEN DOC REV: CPT | Performed by: INTERNAL MEDICINE

## 2020-12-02 PROCEDURE — G8420 CALC BMI NORM PARAMETERS: HCPCS | Performed by: INTERNAL MEDICINE

## 2020-12-02 NOTE — PROGRESS NOTES
1. Have you been to the ER, urgent care clinic since your last visit? Hospitalized since your last visit?    no  2. Have you seen or consulted any other health care providers outside of the [address] [place] [address] since your last visit? Include any pap smears or colon screening.       No

## 2020-12-02 NOTE — PROGRESS NOTES
HISTORY OF PRESENT ILLNESS  Marcelino Weller is a 70 y.o. male. Palpitations    The history is provided by the patient. This is a chronic problem. The current episode started more than 1 week ago. The problem has not changed since onset. The problem occurs rarely. Associated symptoms include shortness of breath. Pertinent negatives include no diaphoresis, no fever, no claudication, no near-syncope, no orthopnea, no PND, no syncope, no nausea, no vomiting, no leg pain, no dizziness, no weakness, no cough, no hemoptysis and no sputum production. Risk factors include dyslipidemia, hypertension and male gender. His past medical history is significant for hypertension and atrial fibrillation. His past medical history does not include hyperthyroidism. Shortness of Breath   The history is provided by the patient. This is a chronic problem. The problem occurs intermittently. The current episode started more than 1 week ago. The problem has not changed since onset. Pertinent negatives include no fever, no ear pain, no neck pain, no cough, no sputum production, no hemoptysis, no wheezing, no PND, no orthopnea, no syncope, no vomiting, no rash, no leg pain, no leg swelling and no claudication. He has had prior hospitalizations. Associated medical issues do not include chronic lung disease, CAD or heart failure. Hypertension   Associated symptoms include shortness of breath. Review of Systems   Constitutional: Negative for chills, diaphoresis, fever and weight loss. HENT: Negative for ear pain and hearing loss. Eyes: Negative for blurred vision. Respiratory: Positive for shortness of breath. Negative for cough, hemoptysis, sputum production, wheezing and stridor. Cardiovascular: Positive for palpitations. Negative for orthopnea, claudication, leg swelling, syncope, PND and near-syncope. Gastrointestinal: Negative for heartburn, nausea and vomiting. Musculoskeletal: Negative for myalgias and neck pain. Skin: Negative for rash. Neurological: Negative for dizziness, tingling, tremors, focal weakness, loss of consciousness and weakness. Psychiatric/Behavioral: Negative for depression and suicidal ideas. Family History   Problem Relation Age of Onset    Hypertension Mother     Heart Attack Father     Pacemaker Father        Past Medical History:   Diagnosis Date    [facility] 9/3/09    Atrial flutter (HCC)     BPH (benign prostatic hypertrophy) 9/3/09    Carotid duplex 05    No stenosis bilaterally     Diabetes mellitus, type 2 [facility]     Echocardiogram 10/27/2011    EF 60-65%. Gr 1 DDfx. RVSP 30 mmHg. Mild LAE. Mild MR. Diastolic doming of mitral valve. Mild IVCE.     Elevated PSA     Hypercholesterolemia     dislipidemia    Hypertension     Hypothyroidism     on replacement therapy    Long term (current) use of anticoagulants 3/16/2011    Lower extremity arterial duplex 09    No evidence of arterial insufficiency bilaterally at rest    Paroxysmal atrial fibrillation (HCC)     Personal history of urinary calculi 9/3/09    Stress thallium 08    Very small scarring in basal inferior wall w/min ischemia likely artifact; EF 68%; rapid AF during exercise; EKG portion negative which was terminated prematurely because of the persistent rapid AF       Past Surgical History:   Procedure Laterality Date    ABDOMEN SURGERY PROC UNLISTED      Colon surgical repair following puncture during removal of polyps    HX HERNIA REPAIR      , left; , right    HX LITHOTRIPSY  10/2004    Removed kidney stones    HX OTHER SURGICAL  2015    Scar tissue removed from bowels    HX TONSILLECTOMY      age 12       Social History     Tobacco Use    Smoking status: Former Smoker     Packs/day: 1.50     Years: 35.00     Pack years: 52.50     Last attempt to quit: 10/11/1980     Years since quittin.1    Smokeless tobacco: Never Used   Substance Use Topics  Alcohol use: No       Allergies   Allergen Reactions    Iodinated Contrast Media Unknown (comments)     States no allergy to iodine    Other Medication Unknown (comments)     Anesthesia (nausea)       Outpatient Medications Marked as Taking for the 12/2/20 encounter (Office Visit) with Andrea Matias MD   Medication Sig Dispense Refill    digoxin (LANOXIN) 0.125 mg tablet TAKE 1 TABLET ONCE DAILY FOR HEART 90 Tab 3    insulin lispro protamin/lispro (HUMALOG MIX 50-50 SC) by SubCUTAneous route.  dutasteride (AVODART) 0.5 mg capsule Take 1 capsule twice a week. 30 Cap 3    warfarin (COUMADIN) 5 mg tablet Take (2) tabs altnerating with (1 1/2) tab every other evening. 105 Tab 0    warfarin (COUMADIN) 5 mg tablet Take (2) tabs altnerating with (1 1/2) tab every other evening. 105 Tab 0    cholecalciferol (VITAMIN D3) (2,000 UNITS /50 MCG) cap capsule Take  by mouth two (2) times a day.  metoprolol succinate (TOPROL-XL) 25 mg XL tablet TAKE (1) TABLET TWICE A  Tab 3    insulin lispro protamin/lispro (HUMALOG MIX 50-50 SC) by SubCUTAneous route.  BD VEO INSULIN SYRINGE UF 1/2 mL 31 gauge x 15/64\" syrg       warfarin (COUMADIN) 5 mg tablet Alternate 10mg and 7.5mg every evening or as directed by physician. 120 Tab 0    Levothyroxine 125 mcg cap 125 mcg.  HUMALOG MIX 50-50 INSULN U-100 100 unit/mL (50-50) injection       lispro-protamine & lispro (HUMALOG MIX 50-50) 100 unit/mL (50-50) flexpen by SubCUTAneous route two (2) times a day. 22 units Q AM, 10 units QPM           Visit Vitals  BP (!) 142/90   Pulse 82   Temp 97.4 °F (36.3 °C) (Temporal)   Ht 5' 9\" (1.753 m)   Wt 74.4 kg (164 lb)   BMI 24.22 kg/m²     Physical Exam   Constitutional: He is oriented to person, place, and time. He appears well-developed and well-nourished. No distress. HENT:   Head: Atraumatic. Mouth/Throat: No oropharyngeal exudate. Eyes: Conjunctivae are normal. No scleral icterus.    Neck: Neck supple. No JVD present. Cardiovascular: Normal rate and regular rhythm. Exam reveals no gallop. Murmur (3/6 holosystolic murmur best heard at mitral area with no radiation) heard. Pulmonary/Chest: Effort normal and breath sounds normal. No stridor. He has no wheezes. He has no rales. Abdominal: Soft. There is no abdominal tenderness. There is no rebound. Musculoskeletal: Normal range of motion. General: No tenderness or edema. Neurological: He is alert and oriented to person, place, and time. He exhibits normal muscle tone. Skin: Skin is warm. He is not diaphoretic. Psychiatric: He has a normal mood and affect. His behavior is normal.     ekg atrial fibrillaton with controlled ventricular response. 04/09/19   ECHO ADULT COMPLETE 04/11/2019 4/11/2019    Narrative · Estimated left ventricular ejection fraction is 61 - 65%. No regional   wall motion abnormality noted. Moderate (grade 2) left ventricular   diastolic dysfunction. · Left atrial cavity size is moderately dilated. · Right ventricular cavity size is mildly dilated. · Right atrial cavity size is moderately dilated. · Aortic valve leaflet calcification present with reduced excursion. Mild   aortic valve stenosis is present. Mild aortic valve regurgitation is   present. · Mitral valve thickening. Mild to moderate mitral valve regurgitation. · Mild to moderate tricuspid valve regurgitation is present. There is no   evidence of pulmonary hypertension. · Mild pulmonic valve regurgitation is present. · Mild aortic root (3.9 cm) and ascending aorta (4.2 cm) dilatation. · Mildly elevated central venous pressure (5-10 mmHg); IVC diameter is   less than 21 mm and collapses less than 50% with respiration. Signed by: Mortimer Santos, MD     10/12/20   ECHO ADULT COMPLETE 10/15/2020 10/15/2020    Narrative · LV: Estimated LVEF is 55 - 60%. Normal cavity size, wall thickness and   systolic function (ejection fraction normal). Wall motion: normal. Severe   (grade 3) left ventricular diastolic dysfunction. · LA: Severely dilated left atrium. Left Atrium volume index is 62.92   mL/m2. · RA: Moderately dilated right atrium. · AV: Aortic valve leaflet calcification present. Aortic valve mean   gradient is 8.2 mmHg. Aortic valve area is 1.8 cm2. Mild aortic valve   stenosis is present. · MV: Mitral valve thickening. Mitral annular calcification. Mild to   moderate mitral valve regurgitation is present. · TV: Moderate tricuspid valve regurgitation is present. · PV: Mild pulmonic valve regurgitation is present. · AO: Mild aortic root and ascending aorta dilatation. Aortic root   diameter = 4.0 cm. Ascending aorta diameter = 4.2 cm. · PA: Pulmonary arterial systolic pressure is 27 mmHg. Signed by: Arlene Ash MD     ASSESSMENT and PLAN    ICD-10-CM ICD-9-CM    1. Moderate mitral regurgitation  I34.0 424.0    2. chronic atrial fibrillation  I48.92 427.32    3. Hypertension  I11.9 402.10    4. Hypothyroidism due to acquired atrophy of thyroid  E03.4 244.8      246.8    5. Type 2 diabetes mellitus without complication, unspecified whether long term insulin use (HCC)  E11.9 250.00    6. Dyslipidemia  E78.5 272.4      Follow-up and Dispositions    · Return in about 6 months (around 6/2/2021). Patient on lopressor and digoxin for rate control. Continue coumadin for anticoagulation. Has mild to moderate mitral regurgitation with mildly enlarged chamber sizes. Unchanged from prior echo  bp elevated-- advised home bp monitoring- will change meds as needed for tight bp control  Follow up in 6months.

## 2020-12-22 ENCOUNTER — TELEPHONE ANTICOAG (OUTPATIENT)
Dept: CARDIOLOGY CLINIC | Age: 75
End: 2020-12-22

## 2020-12-22 DIAGNOSIS — I48.91 ATRIAL FIBRILLATION, UNSPECIFIED TYPE (HCC): ICD-10-CM

## 2020-12-22 LAB — INR, EXTERNAL: 2.7

## 2020-12-22 NOTE — PATIENT INSTRUCTIONS
Mr. Miranda Askew is here today for anticoagulation monitoring for the diagnosis of Atrial Fibrillation. His INR goal is 2.0-3.0 and his current Coumadin dose is alternating 7.5 mg and 10 mg every 2 days. Today's findings include an INR of 2.7 Considering Mr. Daigle's past history, todays findings, and per the coumadin policy/protocol, Mr. Maykel Haddad was instructed to take Coumadin as follows,  Resume current dose of coumadin. He was also instructed to come back in 4 weeks for an INR check. A full discussion of the nature of anticoagulants has been carried out. A full discussion of the need for frequent and regular monitoring, precise dosage adjustment and compliance was stressed. Side effects of potential bleeding were discussed and Mr. Maykel Haddad was instructed to call [phone] if there are any signs of abnormal bleeding. Mr. Maykel Haddad was instructed to avoid any OTC items containing aspirin or ibuprofen and prior to starting any new OTC products to consult with his physician or pharmacist to ensure no drug interactions are present. Mr. Maykel Haddad was instructed to avoid any major changes in his general diet and to avoid alcohol consumption. . 
 
 
Mr. Maykel Haddad verbalized his understanding of all instructions and will call the office with any questions, concerns, or signs of abnormal bleeding or blood clot.

## 2021-01-07 DIAGNOSIS — I48.92 ATRIAL FLUTTER, UNSPECIFIED TYPE (HCC): ICD-10-CM

## 2021-01-07 RX ORDER — WARFARIN SODIUM 5 MG/1
TABLET ORAL
Qty: 105 TAB | Refills: 0 | Status: SHIPPED | OUTPATIENT
Start: 2021-01-07 | End: 2021-05-13

## 2021-01-19 ENCOUNTER — TELEPHONE ANTICOAG (OUTPATIENT)
Dept: CARDIOLOGY CLINIC | Age: 76
End: 2021-01-19

## 2021-01-19 DIAGNOSIS — I48.91 ATRIAL FIBRILLATION, UNSPECIFIED TYPE (HCC): ICD-10-CM

## 2021-01-19 LAB — INR, EXTERNAL: 3

## 2021-01-19 NOTE — PATIENT INSTRUCTIONS
Mr. Alphonse Daigle is here today for anticoagulation monitoring for the diagnosis of Atrial Fibrillation.  His INR goal is 2.0-3.0 and his current Coumadin dose is alternating 7.5 mg and 10 mg every 2 days.     Today's findings include an INR of 3.0     Considering Mr. Daigle's past history, todays findings, and per the coumadin policy/protocol, Mr. Daigle was instructed to take Coumadin as follows,  Resume current dose of coumadin.  He was also instructed to come back in 4 weeks for an INR check.    A full discussion of the nature of anticoagulants has been carried out.  A full discussion of the need for frequent and regular monitoring, precise dosage adjustment and compliance was stressed.  Side effects of potential bleeding were discussed and Mr. Daigle was instructed to call [phone] if there are any signs of abnormal bleeding.  Mr. Daigle was instructed to avoid any OTC items containing aspirin or ibuprofen and prior to starting any new OTC products to consult with his physician or pharmacist to ensure no drug interactions are present.  Mr. Daigle was instructed to avoid any major changes in his general diet and to avoid alcohol consumption.  .      Mr. Daigle verbalized his understanding of all instructions and will call the office with any questions, concerns, or signs of abnormal bleeding or blood clot.

## 2021-02-16 ENCOUNTER — TELEPHONE ANTICOAG (OUTPATIENT)
Dept: CARDIOLOGY CLINIC | Age: 76
End: 2021-02-16

## 2021-02-16 DIAGNOSIS — I48.91 ATRIAL FIBRILLATION, UNSPECIFIED TYPE (HCC): ICD-10-CM

## 2021-02-16 LAB — INR, EXTERNAL: 2.4

## 2021-02-16 NOTE — PATIENT INSTRUCTIONS
Mr. Parisa Melchor is here today for anticoagulation monitoring for the diagnosis of Atrial Fibrillation. His INR goal is 2.0-3.0 and his current Coumadin dose is alternate 7.5 mg and 10 mg every 2 days. Today's findings include an INR of 2.4    Considering Mr. Daigle's past history, todays findings, and per the coumadin policy/protocol, Mr. Verenice Wick was instructed to take Coumadin as follows,  Resume current dose of coumadin. He was also instructed to come back in 3 weeks for an INR check. A full discussion of the nature of anticoagulants has been carried out. A full discussion of the need for frequent and regular monitoring, precise dosage adjustment and compliance was stressed. Side effects of potential bleeding were discussed and Mr. Verenice Wick was instructed to call [phone] if there are any signs of abnormal bleeding. Mr. Verenice Wick was instructed to avoid any OTC items containing aspirin or ibuprofen and prior to starting any new OTC products to consult with his physician or pharmacist to ensure no drug interactions are present. Mr. Verenice Wick was instructed to avoid any major changes in his general diet and to avoid alcohol consumption. .      Mr. Verenice Wick verbalized his understanding of all instructions and will call the office with any questions, concerns, or signs of abnormal bleeding or blood clot.

## 2021-03-17 ENCOUNTER — TELEPHONE ANTICOAG (OUTPATIENT)
Dept: CARDIOLOGY CLINIC | Age: 76
End: 2021-03-17

## 2021-03-17 DIAGNOSIS — I48.91 ATRIAL FIBRILLATION, UNSPECIFIED TYPE (HCC): ICD-10-CM

## 2021-03-17 LAB — INR, EXTERNAL: 2.7

## 2021-03-17 NOTE — PATIENT INSTRUCTIONS
Mr. Joel Mar is here today for anticoagulation monitoring for the diagnosis of Atrial Fibrillation. His INR goal is 2.0-3.0 and his current Coumadin dose is Alternating 7.5 mg and 10 mg every 2 days. Today's findings include an INR of 2.7    Considering Mr. Daigle's past history, todays findings, and per the coumadin policy/protocol, Mr. Rand Cottrell was instructed to take Coumadin as follows,  Resume current dose of coumadin. He was also instructed to come back in 4 weeks for an INR check. A full discussion of the nature of anticoagulants has been carried out. A full discussion of the need for frequent and regular monitoring, precise dosage adjustment and compliance was stressed. Side effects of potential bleeding were discussed and Mr. Rand Cottrell was instructed to call [phone] if there are any signs of abnormal bleeding. Mr. Rand Cottrell was instructed to avoid any OTC items containing aspirin or ibuprofen and prior to starting any new OTC products to consult with his physician or pharmacist to ensure no drug interactions are present. Mr. Rand Cottrell was instructed to avoid any major changes in his general diet and to avoid alcohol consumption. .      Mr. Rand Cottrell verbalized his understanding of all instructions and will call the office with any questions, concerns, or signs of abnormal bleeding or blood clot.

## 2021-04-14 ENCOUNTER — TELEPHONE ANTICOAG (OUTPATIENT)
Dept: CARDIOLOGY CLINIC | Age: 76
End: 2021-04-14

## 2021-04-14 DIAGNOSIS — I48.91 ATRIAL FIBRILLATION, UNSPECIFIED TYPE (HCC): ICD-10-CM

## 2021-04-14 LAB — INR, EXTERNAL: 2.8

## 2021-04-14 NOTE — PATIENT INSTRUCTIONS
Mr. Summer Lantigua is here today for anticoagulation monitoring for the diagnosis of Atrial Fibrillation. His INR goal is 2.0-3.0 and his current Coumadin dose is alternating 10 mg and 7.5 mg every 2 days. Today's findings include an INR of 2.8     Considering Mr. Daigle's past history, todays findings, and per the coumadin policy/protocol, Mr. Kyree Elliott was instructed to take Coumadin as follows,  Resume current dose of coumadin. He was also instructed to come back in 4 weeks for an INR check. A full discussion of the nature of anticoagulants has been carried out. A full discussion of the need for frequent and regular monitoring, precise dosage adjustment and compliance was stressed. Side effects of potential bleeding were discussed and Mr. Kyree Elliott was instructed to call [phone] if there are any signs of abnormal bleeding. Mr. Kyree Elliott was instructed to avoid any OTC items containing aspirin or ibuprofen and prior to starting any new OTC products to consult with his physician or pharmacist to ensure no drug interactions are present. Mr. Kyree Elliott was instructed to avoid any major changes in his general diet and to avoid alcohol consumption. .      Mr. Kyree Elliott verbalized his understanding of all instructions and will call the office with any questions, concerns, or signs of abnormal bleeding or blood clot.

## 2021-05-13 DIAGNOSIS — I48.92 ATRIAL FLUTTER, UNSPECIFIED TYPE (HCC): ICD-10-CM

## 2021-05-13 RX ORDER — WARFARIN SODIUM 5 MG/1
TABLET ORAL
Qty: 105 TAB | Refills: 0 | Status: SHIPPED | OUTPATIENT
Start: 2021-05-13 | End: 2022-07-01 | Stop reason: SDUPTHER

## 2021-05-26 ENCOUNTER — TELEPHONE ANTICOAG (OUTPATIENT)
Dept: CARDIOLOGY CLINIC | Age: 76
End: 2021-05-26

## 2021-05-26 DIAGNOSIS — I48.91 ATRIAL FIBRILLATION, UNSPECIFIED TYPE (HCC): Primary | ICD-10-CM

## 2021-05-26 LAB — INR, EXTERNAL: 2.8

## 2021-05-26 NOTE — PATIENT INSTRUCTIONS
Mr. Angella Ford is here today for anticoagulation monitoring for the diagnosis of Atrial Fibrillation. His INR goal is 2.0-3.0 and his current Coumadin dose is alternating 10 mg and 7.5 mg every 2 days. Today's findings include an INR of 2.8     Considering Mr. Daigle's past history, todays findings, and per the coumadin policy/protocol, Mr. Shant Mcdermott was instructed to take Coumadin as follows,  Resume current dose of coumadin. He was also instructed to come back in 1 weeks for an INR check. A full discussion of the nature of anticoagulants has been carried out. A full discussion of the need for frequent and regular monitoring, precise dosage adjustment and compliance was stressed. Side effects of potential bleeding were discussed and Mr. Shant Mcdermott was instructed to call [phone] if there are any signs of abnormal bleeding. Mr. Shant Mcdermott was instructed to avoid any OTC items containing aspirin or ibuprofen and prior to starting any new OTC products to consult with his physician or pharmacist to ensure no drug interactions are present. Mr. Shant Mcdermott was instructed to avoid any major changes in his general diet and to avoid alcohol consumption. .      Mr. Shant Mcdermott verbalized his understanding of all instructions and will call the office with any questions, concerns, or signs of abnormal bleeding or blood clot.

## 2021-06-02 ENCOUNTER — TELEPHONE ANTICOAG (OUTPATIENT)
Dept: CARDIOLOGY CLINIC | Age: 76
End: 2021-06-02

## 2021-06-02 DIAGNOSIS — I48.91 ATRIAL FIBRILLATION, UNSPECIFIED TYPE (HCC): Primary | ICD-10-CM

## 2021-06-02 LAB — INR, EXTERNAL: 2.9

## 2021-06-02 NOTE — PATIENT INSTRUCTIONS
Mr. Kyree Marks is here today for anticoagulation monitoring for the diagnosis of Atrial Fibrillation. His INR goal is 2.0-3.0 and his current Coumadin dose is alternating 7.5 mg and 10 mg every 2 days. Today's findings include an INR of 2.9 Considering Mr. Daigle's past history, todays findings, and per the coumadin policy/protocol, Mr. Nicky Mcguire was instructed to take Coumadin as follows,  Resume current dose of coumadin. He was also instructed to come back in 1 weeks for an INR check. A full discussion of the nature of anticoagulants has been carried out. A full discussion of the need for frequent and regular monitoring, precise dosage adjustment and compliance was stressed. Side effects of potential bleeding were discussed and Mr. Nicky Mcguire was instructed to call [phone] if there are any signs of abnormal bleeding. Mr. Nicky Mcguire was instructed to avoid any OTC items containing aspirin or ibuprofen and prior to starting any new OTC products to consult with his physician or pharmacist to ensure no drug interactions are present. Mr. Nicky Mcguire was instructed to avoid any major changes in his general diet and to avoid alcohol consumption. . 
 
 
Mr. Nicky Mcguire verbalized his understanding of all instructions and will call the office with any questions, concerns, or signs of abnormal bleeding or blood clot.

## 2021-06-03 RX ORDER — METOPROLOL SUCCINATE 25 MG/1
TABLET, EXTENDED RELEASE ORAL
Qty: 180 TABLET | Refills: 3 | OUTPATIENT
Start: 2021-06-03

## 2021-06-03 RX ORDER — METOPROLOL SUCCINATE 25 MG/1
TABLET, EXTENDED RELEASE ORAL
Qty: 180 TABLET | Refills: 0 | Status: SHIPPED | OUTPATIENT
Start: 2021-06-03 | End: 2021-08-31

## 2021-06-09 ENCOUNTER — TELEPHONE ANTICOAG (OUTPATIENT)
Dept: CARDIOLOGY CLINIC | Age: 76
End: 2021-06-09

## 2021-06-09 ENCOUNTER — OFFICE VISIT (OUTPATIENT)
Dept: CARDIOLOGY CLINIC | Age: 76
End: 2021-06-09
Payer: MEDICARE

## 2021-06-09 VITALS
DIASTOLIC BLOOD PRESSURE: 78 MMHG | WEIGHT: 162 LBS | BODY MASS INDEX: 23.99 KG/M2 | SYSTOLIC BLOOD PRESSURE: 133 MMHG | HEIGHT: 69 IN | HEART RATE: 83 BPM

## 2021-06-09 DIAGNOSIS — E03.4 HYPOTHYROIDISM DUE TO ACQUIRED ATROPHY OF THYROID: ICD-10-CM

## 2021-06-09 DIAGNOSIS — I11.9 BENIGN HYPERTENSIVE HEART DISEASE WITHOUT HEART FAILURE: ICD-10-CM

## 2021-06-09 DIAGNOSIS — I34.0 MODERATE MITRAL REGURGITATION: ICD-10-CM

## 2021-06-09 DIAGNOSIS — I48.20 CHRONIC ATRIAL FIBRILLATION (HCC): Primary | ICD-10-CM

## 2021-06-09 DIAGNOSIS — E11.9 TYPE 2 DIABETES MELLITUS WITHOUT COMPLICATION, UNSPECIFIED WHETHER LONG TERM INSULIN USE (HCC): ICD-10-CM

## 2021-06-09 DIAGNOSIS — E78.5 DYSLIPIDEMIA: ICD-10-CM

## 2021-06-09 LAB — INR, EXTERNAL: 3.2

## 2021-06-09 PROCEDURE — G8428 CUR MEDS NOT DOCUMENT: HCPCS | Performed by: INTERNAL MEDICINE

## 2021-06-09 PROCEDURE — G8536 NO DOC ELDER MAL SCRN: HCPCS | Performed by: INTERNAL MEDICINE

## 2021-06-09 PROCEDURE — 3046F HEMOGLOBIN A1C LEVEL >9.0%: CPT | Performed by: INTERNAL MEDICINE

## 2021-06-09 PROCEDURE — 1101F PT FALLS ASSESS-DOCD LE1/YR: CPT | Performed by: INTERNAL MEDICINE

## 2021-06-09 PROCEDURE — 2022F DILAT RTA XM EVC RTNOPTHY: CPT | Performed by: INTERNAL MEDICINE

## 2021-06-09 PROCEDURE — 99214 OFFICE O/P EST MOD 30 MIN: CPT | Performed by: INTERNAL MEDICINE

## 2021-06-09 PROCEDURE — 3017F COLORECTAL CA SCREEN DOC REV: CPT | Performed by: INTERNAL MEDICINE

## 2021-06-09 PROCEDURE — G8432 DEP SCR NOT DOC, RNG: HCPCS | Performed by: INTERNAL MEDICINE

## 2021-06-09 PROCEDURE — G8420 CALC BMI NORM PARAMETERS: HCPCS | Performed by: INTERNAL MEDICINE

## 2021-06-09 NOTE — LETTER
6/9/2021 12:07 PM 
 
Patient:  Melida Whal YOB: 1945 Date of Visit: 6/9/2021 Dear Aida Boyd MD 
[address] [facility] [address] [address] [address] [address] Via Fax: [phone] Diana Vang MD 
[address] [facility] [address] [address] [address] [address] [address] In [employer] Eddie Seth MD 
[address] [id number] [facility] [address] [address] Fax: [phone]: Thank you for referring Mr. Cindy Capellan to me for evaluation/treatment. Below are the relevant portions of my assessment and plan of care. If you have questions, please do not hesitate to call me. I look forward to following Mr. Sherman Arrington along with you.  
 
 
 
Sincerely, 
 
 
Dama Primrose, MD

## 2021-06-09 NOTE — PROGRESS NOTES
HISTORY OF PRESENT ILLNESS  Chapo Mari is a 70 y.o. male. Palpitations   The history is provided by the patient. This is a chronic problem. The current episode started more than 1 week ago. The problem has not changed since onset. The problem occurs daily. Pertinent negatives include no diaphoresis, no fever, no claudication, no near-syncope, no orthopnea, no PND, no syncope, no nausea, no vomiting, no leg pain, no dizziness, no weakness, no cough, no hemoptysis and no sputum production. Risk factors include dyslipidemia, hypertension and male gender. His past medical history is significant for atrial fibrillation. His past medical history does not include hyperthyroidism. Shortness of Breath  The history is provided by the patient. This is a chronic problem. The problem occurs intermittently. The current episode started more than 1 week ago. The problem has not changed since onset. Pertinent negatives include no fever, no ear pain, no neck pain, no cough, no sputum production, no hemoptysis, no wheezing, no PND, no orthopnea, no syncope, no vomiting, no rash, no leg pain, no leg swelling and no claudication. He has had prior hospitalizations. Associated medical issues do not include chronic lung disease, CAD or heart failure. Review of Systems   Constitutional: Negative for chills, diaphoresis, fever and weight loss. HENT: Negative for ear pain and hearing loss. Eyes: Negative for blurred vision. Respiratory: Negative for cough, hemoptysis, sputum production, wheezing and stridor. Cardiovascular: Positive for palpitations. Negative for orthopnea, claudication, leg swelling, syncope, PND and near-syncope. Gastrointestinal: Negative for heartburn, nausea and vomiting. Musculoskeletal: Negative for myalgias and neck pain. Skin: Negative for rash. Neurological: Negative for dizziness, tingling, tremors, focal weakness, loss of consciousness and weakness.    Psychiatric/Behavioral: Negative for depression and suicidal ideas. Family History   Problem Relation Age of Onset    Hypertension Mother     Heart Attack Father     Pacemaker Father        Past Medical History:   Diagnosis Date    AF (atrial fibrillation) ([facility] [address] 9/3/09    Atrial flutter (HCC)     BPH (benign prostatic hypertrophy) 9/3/09    Carotid duplex 05    No stenosis bilaterally     Diabetes mellitus, type 2 ([facility] [address]     Echocardiogram 10/27/2011    EF 60-65%. Gr 1 DDfx. RVSP 30 mmHg. Mild LAE. Mild MR. Diastolic doming of mitral valve. Mild IVCE.     Elevated PSA     Hypercholesterolemia     dislipidemia    Hypertension     Hypothyroidism     on replacement therapy    Long term (current) use of anticoagulants 3/16/2011    Lower extremity arterial duplex 09    No evidence of arterial insufficiency bilaterally at rest    Paroxysmal atrial fibrillation (HCC)     Personal history of urinary calculi 9/3/09    Stress thallium 08    Very small scarring in basal inferior wall w/min ischemia likely artifact; EF 68%; rapid AF during exercise; EKG portion negative which was terminated prematurely because of the persistent rapid AF       Past Surgical History:   Procedure Laterality Date    ABDOMEN SURGERY PROC UNLISTED      Colon surgical repair following puncture during removal of polyps    HX HERNIA REPAIR      , left; , right    HX LITHOTRIPSY  10/2004    Removed kidney stones    HX OTHER SURGICAL  2015    Scar tissue removed from bowels    HX TONSILLECTOMY      age 12       Social History     Tobacco Use    Smoking status: Former Smoker     Packs/day: 1.50     Years: 35.00     Pack years: 52.50     Quit date: 10/11/1980     Years since quittin.6    Smokeless tobacco: Never Used   Substance Use Topics    Alcohol use: No       Allergies   Allergen Reactions    Iodinated Contrast Media Unknown (comments)     States no allergy to iodine    Other Medication Unknown (comments) Anesthesia (nausea)       Outpatient Medications Marked as Taking for the 6/9/21 encounter (Office Visit) with Rohini Parekh MD   Medication Sig Dispense Refill    metoprolol succinate (TOPROL-XL) 25 mg XL tablet TAKE (1) TABLET TWICE A  Tablet 0    warfarin (COUMADIN) 5 mg tablet Take (2) tabs altnerating with (1 1/2) tab every other evening. 105 Tab 0    digoxin (LANOXIN) 0.125 mg tablet TAKE 1 TABLET ONCE DAILY FOR HEART 90 Tab 3    insulin lispro protamin/lispro (HUMALOG MIX 50-50 SC) by SubCUTAneous route.  dutasteride (AVODART) 0.5 mg capsule Take 1 capsule twice a week. 30 Cap 3    warfarin (COUMADIN) 5 mg tablet Take (2) tabs altnerating with (1 1/2) tab every other evening. 105 Tab 0    cholecalciferol (VITAMIN D3) (2,000 UNITS /50 MCG) cap capsule Take  by mouth two (2) times a day.  insulin lispro protamin/lispro (HUMALOG MIX 50-50 SC) by SubCUTAneous route.  BD VEO INSULIN SYRINGE UF 1/2 mL 31 gauge x 15/64\" syrg       warfarin (COUMADIN) 5 mg tablet Alternate 10mg and 7.5mg every evening or as directed by physician. 120 Tab 0    levothyroxine (SYNTHROID) 137 mcg tablet 125 mcg.  HUMALOG MIX 50-50 INSULN U-100 100 unit/mL (50-50) injection       lispro-protamine & lispro (HUMALOG MIX 50-50) 100 unit/mL (50-50) flexpen by SubCUTAneous route two (2) times a day. 22 units Q AM, 10 units QPM           Visit Vitals  /78   Pulse 83   Ht 5' 9\" (1.753 m)   Wt 73.5 kg (162 lb)   BMI 23.92 kg/m²     Physical Exam  Constitutional:       General: He is not in acute distress. Appearance: He is well-developed. He is not diaphoretic. HENT:      Head: Atraumatic. Mouth/Throat:      Pharynx: No oropharyngeal exudate. Eyes:      General: No scleral icterus. Conjunctiva/sclera: Conjunctivae normal.   Neck:      Vascular: No JVD. Cardiovascular:      Rate and Rhythm: Normal rate and regular rhythm.       Heart sounds: Murmur (3/6 holosystolic murmur best heard at mitral area with no radiation) heard. No gallop. Pulmonary:      Effort: Pulmonary effort is normal.      Breath sounds: Normal breath sounds. No stridor. No wheezing or rales. Abdominal:      Palpations: Abdomen is soft. Tenderness: There is no abdominal tenderness. There is no rebound. Musculoskeletal:         General: No tenderness. Normal range of motion. Cervical back: Neck supple. Skin:     General: Skin is warm. Neurological:      Mental Status: He is alert and oriented to person, place, and time. Motor: No abnormal muscle tone. Psychiatric:         Behavior: Behavior normal.       ekg atrial fibrillaton with controlled ventricular response. 04/09/19   ECHO ADULT COMPLETE 04/11/2019 4/11/2019    Narrative · Estimated left ventricular ejection fraction is 61 - 65%. No regional   wall motion abnormality noted. Moderate (grade 2) left ventricular   diastolic dysfunction. · Left atrial cavity size is moderately dilated. · Right ventricular cavity size is mildly dilated. · Right atrial cavity size is moderately dilated. · Aortic valve leaflet calcification present with reduced excursion. Mild   aortic valve stenosis is present. Mild aortic valve regurgitation is   present. · Mitral valve thickening. Mild to moderate mitral valve regurgitation. · Mild to moderate tricuspid valve regurgitation is present. There is no   evidence of pulmonary hypertension. · Mild pulmonic valve regurgitation is present. · Mild aortic root (3.9 cm) and ascending aorta (4.2 cm) dilatation. · Mildly elevated central venous pressure (5-10 mmHg); IVC diameter is   less than 21 mm and collapses less than 50% with respiration. Signed by: Sole Simpson MD     10/12/20   ECHO ADULT COMPLETE 10/15/2020 10/15/2020    Narrative · LV: Estimated LVEF is 55 - 60%. Normal cavity size, wall thickness and   systolic function (ejection fraction normal).  Wall motion: normal. Severe   (grade 3) left ventricular diastolic dysfunction. · LA: Severely dilated left atrium. Left Atrium volume index is 62.92   mL/m2. · RA: Moderately dilated right atrium. · AV: Aortic valve leaflet calcification present. Aortic valve mean   gradient is 8.2 mmHg. Aortic valve area is 1.8 cm2. Mild aortic valve   stenosis is present. · MV: Mitral valve thickening. Mitral annular calcification. Mild to   moderate mitral valve regurgitation is present. · TV: Moderate tricuspid valve regurgitation is present. · PV: Mild pulmonic valve regurgitation is present. · AO: Mild aortic root and ascending aorta dilatation. Aortic root   diameter = 4.0 cm. Ascending aorta diameter = 4.2 cm. · PA: Pulmonary arterial systolic pressure is 27 mmHg. Signed by: Brianna Sarkar MD     ASSESSMENT and PLAN    ICD-10-CM ICD-9-CM    1. Chronic atrial fibrillation (HCC)  I48.20 427.31    2. Moderate mitral regurgitation  I34.0 424.0    3. Hypertension  I11.9 402.10    4. Hypothyroidism due to acquired atrophy of thyroid  E03.4 244.8      246.8    5. Type 2 diabetes mellitus without complication, unspecified whether long term insulin use (HCC)  E11.9 250.00    6. Dyslipidemia  E78.5 272.4      Follow-up and Dispositions    · Return in about 6 months (around 12/9/2021). Patient on lopressor and digoxin for rate control. Continue coumadin for anticoagulation- INR today 3.3-- advised to hold coumadin for 2 days and then resume usual dose. Repeat INR check in 1 week  Has mild to moderate mitral regurgitation with mildly enlarged chamber sizes.  Unchanged from prior echo  Continue current meds

## 2021-06-09 NOTE — PROGRESS NOTES
1. Have you been to the ER, urgent care clinic since your last visit? Hospitalized since your last visit?     no    2. Have you seen or consulted any other health care providers outside of the [address] [place] [address] since your last visit? Include any pap smears or colon screening.       No

## 2021-06-16 ENCOUNTER — TELEPHONE ANTICOAG (OUTPATIENT)
Dept: CARDIOLOGY CLINIC | Age: 76
End: 2021-06-16

## 2021-06-16 DIAGNOSIS — I48.91 ATRIAL FIBRILLATION, UNSPECIFIED TYPE (HCC): Primary | ICD-10-CM

## 2021-06-16 LAB — INR, EXTERNAL: 2

## 2021-06-16 NOTE — PATIENT INSTRUCTIONS
Mr. Lionel Mckee is here today for anticoagulation monitoring for the diagnosis of Atrial Fibrillation. His INR goal is 2.0-3.0 and his current Coumadin dose is alternating 7.5 mg and 10 mg every 2 days. Today's findings include an INR of 2.0 Considering Mr. Daigle's past history, todays findings, and per the coumadin policy/protocol, Mr. Wanda Thomas was instructed to take Coumadin as follows,  Resume current dose of coumadin. He was also instructed to come back in 1 weeks for an INR check. A full discussion of the nature of anticoagulants has been carried out. A full discussion of the need for frequent and regular monitoring, precise dosage adjustment and compliance was stressed. Side effects of potential bleeding were discussed and Mr. Wanda Thomas was instructed to call [phone] if there are any signs of abnormal bleeding. Mr. Wanda Thomas was instructed to avoid any OTC items containing aspirin or ibuprofen and prior to starting any new OTC products to consult with his physician or pharmacist to ensure no drug interactions are present. Mr. Wanda Thomas was instructed to avoid any major changes in his general diet and to avoid alcohol consumption. . 
 
 
Mr. Wanda Thomas verbalized his understanding of all instructions and will call the office with any questions, concerns, or signs of abnormal bleeding or blood clot.

## 2021-06-23 ENCOUNTER — TELEPHONE ANTICOAG (OUTPATIENT)
Dept: CARDIOLOGY CLINIC | Age: 76
End: 2021-06-23

## 2021-06-23 LAB — INR, EXTERNAL: 3

## 2021-06-23 NOTE — PATIENT INSTRUCTIONS
Mr. Cindy Capellan is here today for anticoagulation monitoring for the diagnosis of Atrial Fibrillation. His INR goal is 2.0-3.0 and his current Coumadin dose is 7.5 mg alt 10 mg . Today's findings include an INR of 3.0     Considering Mr. Daigle's past history, todays findings, and per the coumadin policy/protocol, Mr. Sherman Arrington was instructed to take Coumadin as follows,  Continue taking 7.5 mg alt 10 mg daily. He was also instructed to come back in 1 weeks for an INR check. A full discussion of the nature of anticoagulants has been carried out. A full discussion of the need for frequent and regular monitoring, precise dosage adjustment and compliance was stressed. Side effects of potential bleeding were discussed and Mr. Sherman Arrington was instructed to call [phone] if there are any signs of abnormal bleeding. Mr. Sherman Arrington was instructed to avoid any OTC items containing aspirin or ibuprofen and prior to starting any new OTC products to consult with his physician or pharmacist to ensure no drug interactions are present. Mr. Sherman Arrington was instructed to avoid any major changes in his general diet and to avoid alcohol consumption. .      Mr. Sherman Arrington verbalized his understanding of all instructions and will call the office with any questions, concerns, or signs of abnormal bleeding or blood clot.

## 2021-06-30 ENCOUNTER — TELEPHONE ANTICOAG (OUTPATIENT)
Dept: CARDIOLOGY CLINIC | Age: 76
End: 2021-06-30

## 2021-06-30 DIAGNOSIS — I48.91 ATRIAL FIBRILLATION, UNSPECIFIED TYPE (HCC): Primary | ICD-10-CM

## 2021-06-30 LAB — INR, EXTERNAL: 2.7

## 2021-06-30 NOTE — PATIENT INSTRUCTIONS
Mr. Paul Rodriguez is here today for anticoagulation monitoring for the diagnosis of Atrial Fibrillation. His INR goal is 2.0-3.0 and his current Coumadin dose is alternating 7.5 mg and 10 mg every 2 days. Today's findings include an INR of 2.7     Considering Mr. Daigle's past history, todays findings, and per the coumadin policy/protocol, Mr. Meryle Older was instructed to take Coumadin as follows,  Resume current dose of coumadin. He was also instructed to come back in 1 weeks for an INR check. A full discussion of the nature of anticoagulants has been carried out. A full discussion of the need for frequent and regular monitoring, precise dosage adjustment and compliance was stressed. Side effects of potential bleeding were discussed and Mr. Meryle Older was instructed to call [phone] if there are any signs of abnormal bleeding. Mr. Meryle Older was instructed to avoid any OTC items containing aspirin or ibuprofen and prior to starting any new OTC products to consult with his physician or pharmacist to ensure no drug interactions are present. Mr. Meryle Older was instructed to avoid any major changes in his general diet and to avoid alcohol consumption. .      Mr. Meryle Older verbalized his understanding of all instructions and will call the office with any questions, concerns, or signs of abnormal bleeding or blood clot.

## 2021-07-06 ENCOUNTER — TELEPHONE ANTICOAG (OUTPATIENT)
Dept: CARDIOLOGY CLINIC | Age: 76
End: 2021-07-06

## 2021-07-06 DIAGNOSIS — I48.91 ATRIAL FIBRILLATION, UNSPECIFIED TYPE (HCC): Primary | ICD-10-CM

## 2021-07-06 LAB — INR, EXTERNAL: 2.7

## 2021-07-06 NOTE — PATIENT INSTRUCTIONS
Mr. Natalie Lyles is here today for anticoagulation monitoring for the diagnosis of Atrial Fibrillation. His INR goal is 2.0-3.0 and his current Coumadin dose is alternate 7.5 mg and 10 mg every 2 days. Today's findings include an INR of 2.7    Considering Mr. Daigle's past history, todays findings, and per the coumadin policy/protocol, Mr. Frandy Azar was instructed to take Coumadin as follows,  Resume current dose of coumadin. He was also instructed to come back in 1 weeks for an INR check. A full discussion of the nature of anticoagulants has been carried out. A full discussion of the need for frequent and regular monitoring, precise dosage adjustment and compliance was stressed. Side effects of potential bleeding were discussed and Mr. Frandy Azar was instructed to call [phone] if there are any signs of abnormal bleeding. Mr. Frandy Azar was instructed to avoid any OTC items containing aspirin or ibuprofen and prior to starting any new OTC products to consult with his physician or pharmacist to ensure no drug interactions are present. Mr. Frandy Azar was instructed to avoid any major changes in his general diet and to avoid alcohol consumption. .      Mr. Frandy Azar verbalized his understanding of all instructions and will call the office with any questions, concerns, or signs of abnormal bleeding or blood clot.

## 2021-07-12 RX ORDER — WARFARIN SODIUM 5 MG/1
TABLET ORAL
Qty: 105 TABLET | Refills: 0 | Status: SHIPPED | OUTPATIENT
Start: 2021-07-12 | End: 2022-07-01 | Stop reason: SDUPTHER

## 2021-07-13 ENCOUNTER — TELEPHONE ANTICOAG (OUTPATIENT)
Dept: CARDIOLOGY CLINIC | Age: 76
End: 2021-07-13

## 2021-07-13 DIAGNOSIS — I48.91 ATRIAL FIBRILLATION, UNSPECIFIED TYPE (HCC): Primary | ICD-10-CM

## 2021-07-13 LAB — INR, EXTERNAL: 3.3

## 2021-07-13 NOTE — PATIENT INSTRUCTIONS
Mr. Clark Yo is here today for anticoagulation monitoring for the diagnosis of Atrial Fibrillation. His INR goal is 2.0-3.0 and his current Coumadin dose is alternating 7.5 mg and 10 mg every 2 days. Today's findings include an INR of 3.3    Considering Mr. Daigle's past history, todays findings, and per the coumadin policy/protocol, Mr. Theresa Nixon was instructed to take Coumadin as follows,  Take 2.5 mg then resume current dose of coumadin. He was also instructed to come back in 1 weeks for an INR check. A full discussion of the nature of anticoagulants has been carried out. A full discussion of the need for frequent and regular monitoring, precise dosage adjustment and compliance was stressed. Side effects of potential bleeding were discussed and Mr. Theresa Nixon was instructed to call [phone] if there are any signs of abnormal bleeding. Mr. Theresa Nixon was instructed to avoid any OTC items containing aspirin or ibuprofen and prior to starting any new OTC products to consult with his physician or pharmacist to ensure no drug interactions are present. Mr. Theresa Nixon was instructed to avoid any major changes in his general diet and to avoid alcohol consumption. .      Mr. Theresa Nixon verbalized his understanding of all instructions and will call the office with any questions, concerns, or signs of abnormal bleeding or blood clot.

## 2021-07-20 ENCOUNTER — TELEPHONE ANTICOAG (OUTPATIENT)
Dept: CARDIOLOGY CLINIC | Age: 76
End: 2021-07-20

## 2021-07-20 DIAGNOSIS — I48.91 ATRIAL FIBRILLATION, UNSPECIFIED TYPE (HCC): Primary | ICD-10-CM

## 2021-07-20 LAB — INR, EXTERNAL: 3.8

## 2021-07-20 NOTE — PATIENT INSTRUCTIONS
Mr. Shahrzad Ramirez is here today for anticoagulation monitoring for the diagnosis of Atrial Fibrillation. His INR goal is 2.0-3.0 and his current Coumadin dose is 7.5 mg daily. Today's findings include an INR of 3.8     Considering Mr. Daigle's past history, todays findings, and per the coumadin policy/protocol, Mr. Lindsay Luu was instructed to take Coumadin as follows,  Hold coumadin tonight then resume 7.5 mg dailky  He was also instructed to come back in 1 weeks for an INR check. A full discussion of the nature of anticoagulants has been carried out. A full discussion of the need for frequent and regular monitoring, precise dosage adjustment and compliance was stressed. Side effects of potential bleeding were discussed and Mr. Lindsay Luu was instructed to call [phone] if there are any signs of abnormal bleeding. Mr. Lindsay Luu was instructed to avoid any OTC items containing aspirin or ibuprofen and prior to starting any new OTC products to consult with his physician or pharmacist to ensure no drug interactions are present. Mr. Lindsay Luu was instructed to avoid any major changes in his general diet and to avoid alcohol consumption. .      Mr. Lindsay Luu verbalized his understanding of all instructions and will call the office with any questions, concerns, or signs of abnormal bleeding or blood clot.

## 2021-07-27 ENCOUNTER — TELEPHONE ANTICOAG (OUTPATIENT)
Dept: CARDIOLOGY CLINIC | Age: 76
End: 2021-07-27

## 2021-07-27 LAB — INR, EXTERNAL: 2.7

## 2021-07-28 NOTE — PATIENT INSTRUCTIONS
Mr. Allyssa Ryan is here today for anticoagulation monitoring for the diagnosis of Atrial Fibrillation. His INR goal is 2.0-3.0 and his current Coumadin dose is 7.5 mg daily. Today's findings include an INR of 2.7    Considering Mr. Daigle's past history, todays findings, and per the coumadin policy/protocol, Mr. Tory Dougherty was instructed to take Coumadin as follows,  Resume 7.5 mg daily. He was also instructed to come back in 1 weeks for an INR check. A full discussion of the nature of anticoagulants has been carried out. A full discussion of the need for frequent and regular monitoring, precise dosage adjustment and compliance was stressed. Side effects of potential bleeding were discussed and Mr. Tory Dougherty was instructed to call [phone] if there are any signs of abnormal bleeding. Mr. Tory Dougherty was instructed to avoid any OTC items containing aspirin or ibuprofen and prior to starting any new OTC products to consult with his physician or pharmacist to ensure no drug interactions are present. Mr. Tory Dougherty was instructed to avoid any major changes in his general diet and to avoid alcohol consumption. .      Mr. Tory Dougherty verbalized his understanding of all instructions and will call the office with any questions, concerns, or signs of abnormal bleeding or blood clot.

## 2021-08-03 ENCOUNTER — TELEPHONE ANTICOAG (OUTPATIENT)
Dept: CARDIOLOGY CLINIC | Age: 76
End: 2021-08-03

## 2021-08-03 DIAGNOSIS — I48.91 ATRIAL FIBRILLATION, UNSPECIFIED TYPE (HCC): Primary | ICD-10-CM

## 2021-08-03 PROBLEM — E11.9 DIABETES MELLITUS, TYPE 2 (HCC): Status: RESOLVED | Noted: 2021-08-03 | Resolved: 2021-08-03

## 2021-08-03 LAB — INR, EXTERNAL: 3

## 2021-08-03 NOTE — PATIENT INSTRUCTIONS
Mr. Kaylah Trujillo is here today for anticoagulation monitoring for the diagnosis of Atrial Fibrillation. His INR goal is 2.0-3.0 and his current Coumadin dose is 7.5 mg daily. Today's findings include an INR of 3.0    Considering Mr. Daigle's past history, todays findings, and per the coumadin policy/protocol, Mr. Lali Avalos was instructed to take Coumadin as follows,  Resume current dose of coumadin. He was also instructed to come back in 1 weeks for an INR check. A full discussion of the nature of anticoagulants has been carried out. A full discussion of the need for frequent and regular monitoring, precise dosage adjustment and compliance was stressed. Side effects of potential bleeding were discussed and Mr. Lali Avalos was instructed to call [phone] if there are any signs of abnormal bleeding. Mr. Lali Avalos was instructed to avoid any OTC items containing aspirin or ibuprofen and prior to starting any new OTC products to consult with his physician or pharmacist to ensure no drug interactions are present. Mr. Lali Avalos was instructed to avoid any major changes in his general diet and to avoid alcohol consumption. .      Mr. Lali Avalos verbalized his understanding of all instructions and will call the office with any questions, concerns, or signs of abnormal bleeding or blood clot.

## 2021-08-11 ENCOUNTER — TELEPHONE ANTICOAG (OUTPATIENT)
Dept: CARDIOLOGY CLINIC | Age: 76
End: 2021-08-11

## 2021-08-11 LAB — INR, EXTERNAL: 2.9

## 2021-08-18 ENCOUNTER — TELEPHONE ANTICOAG (OUTPATIENT)
Dept: CARDIOLOGY CLINIC | Age: 76
End: 2021-08-18

## 2021-08-18 DIAGNOSIS — I48.91 ATRIAL FIBRILLATION, UNSPECIFIED TYPE (HCC): Primary | ICD-10-CM

## 2021-08-18 LAB — INR, EXTERNAL: 3.7

## 2021-08-18 NOTE — PATIENT INSTRUCTIONS
Mr. Arianna Silva is here today for anticoagulation monitoring for the diagnosis of Atrial Fibrillation. His INR goal is 2.0-3.0 and his current Coumadin dose is 7.5 mg daily. Today's findings include an INR of 3.7    Considering Mr. Daigle's past history, todays findings, and per the coumadin policy/protocol, Mr. Adriana Swanson was instructed to take Coumadin as follows,  Hold coumadin tonight then resume 7.5 mg daily. He was also instructed to come back in 1 weeks for an INR check. A full discussion of the nature of anticoagulants has been carried out. A full discussion of the need for frequent and regular monitoring, precise dosage adjustment and compliance was stressed. Side effects of potential bleeding were discussed and Mr. Adriana Swanson was instructed to call [phone] if there are any signs of abnormal bleeding. Mr. Adriana Swanson was instructed to avoid any OTC items containing aspirin or ibuprofen and prior to starting any new OTC products to consult with his physician or pharmacist to ensure no drug interactions are present. Mr. Adriana Swanson was instructed to avoid any major changes in his general diet and to avoid alcohol consumption. .      Mr. Adriana Swanson verbalized his understanding of all instructions and will call the office with any questions, concerns, or signs of abnormal bleeding or blood clot.

## 2021-08-25 ENCOUNTER — TELEPHONE ANTICOAG (OUTPATIENT)
Dept: CARDIOLOGY CLINIC | Age: 76
End: 2021-08-25

## 2021-08-25 LAB — INR, EXTERNAL: 2.9

## 2021-08-25 NOTE — PATIENT INSTRUCTIONS
Mr. Mackenzie Isidro is here today for anticoagulation monitoring for the diagnosis of Atrial Fibrillation. His INR goal is 2.0-3.0 and his current Coumadin dose is 7.5 mg daily . Today's findings include an INR of 2.9     Considering Mr. Daigle's past history, todays findings, and per the coumadin policy/protocol, Mr. Corrine Putnam was instructed to take Coumadin as follows,7.5 mg daily  . He was also instructed to come back in 1 weeks for an INR check. A full discussion of the nature of anticoagulants has been carried out. A full discussion of the need for frequent and regular monitoring, precise dosage adjustment and compliance was stressed. Side effects of potential bleeding were discussed and Mr. Corrine Putnam was instructed to call [phone] if there are any signs of abnormal bleeding. Mr. Corrine Putnam was instructed to avoid any OTC items containing aspirin or ibuprofen and prior to starting any new OTC products to consult with his physician or pharmacist to ensure no drug interactions are present. Mr. Corrine Putnam was instructed to avoid any major changes in his general diet and to avoid alcohol consumption. .      Mr. Corrine Putnam verbalized his understanding of all instructions and will call the office with any questions, concerns, or signs of abnormal bleeding or blood clot. Patient stated he did not know he was to take 7.5 daily. He continued the 7.5 mg alt 10 mg.

## 2021-09-01 ENCOUNTER — TELEPHONE ANTICOAG (OUTPATIENT)
Dept: CARDIOLOGY CLINIC | Age: 76
End: 2021-09-01

## 2021-09-01 DIAGNOSIS — I48.0 PAROXYSMAL ATRIAL FIBRILLATION (HCC): Primary | ICD-10-CM

## 2021-09-01 LAB — INR, EXTERNAL: 2.8

## 2021-09-01 NOTE — PATIENT INSTRUCTIONS
Mr. Nely Silva is here today for anticoagulation monitoring for the diagnosis of Atrial Fibrillation. His INR goal is 2.0-3.0 and his current Coumadin dose is 7.5 mg daily. Today's findings include an INR of 2.8     Considering Mr. Daigle's past history, todays findings, and per the coumadin policy/protocol, Mr. Hi Linder was instructed to take Coumadin as follows,  Continue taking 7.5 mg daily. He was also instructed to come back in 1 weeks for an INR check. A full discussion of the nature of anticoagulants has been carried out. A full discussion of the need for frequent and regular monitoring, precise dosage adjustment and compliance was stressed. Side effects of potential bleeding were discussed and Mr. Hi Linder was instructed to call [phone] if there are any signs of abnormal bleeding. Mr. Hi Linder was instructed to avoid any OTC items containing aspirin or ibuprofen and prior to starting any new OTC products to consult with his physician or pharmacist to ensure no drug interactions are present. Mr. Hi Linder was instructed to avoid any major changes in his general diet and to avoid alcohol consumption. .      Mr. Hi Linder verbalized his understanding of all instructions and will call the office with any questions, concerns, or signs of abnormal bleeding or blood clot.

## 2021-09-08 ENCOUNTER — TELEPHONE ANTICOAG (OUTPATIENT)
Dept: CARDIOLOGY CLINIC | Age: 76
End: 2021-09-08

## 2021-09-08 DIAGNOSIS — I48.91 ATRIAL FIBRILLATION, UNSPECIFIED TYPE (HCC): Primary | ICD-10-CM

## 2021-09-08 LAB — INR, EXTERNAL: 2.1

## 2021-09-08 NOTE — PATIENT INSTRUCTIONS
Mr. Cesar Li is here today for anticoagulation monitoring for the diagnosis of Atrial Fibrillation. His INR goal is 2.0-3.0 and his current Coumadin dose is 7.5 mg daily. Today's findings include an INR of 2.1    Considering Mr. Daigle's past history, todays findings, and per the coumadin policy/protocol, Mr. Radha Jessica was instructed to take Coumadin as follows,  Resume current dose of coumadin. He was also instructed to come back in 1 weeks for an INR check. A full discussion of the nature of anticoagulants has been carried out. A full discussion of the need for frequent and regular monitoring, precise dosage adjustment and compliance was stressed. Side effects of potential bleeding were discussed and Mr. Radha Jessica was instructed to call [phone] if there are any signs of abnormal bleeding. Mr. Radha Jessica was instructed to avoid any OTC items containing aspirin or ibuprofen and prior to starting any new OTC products to consult with his physician or pharmacist to ensure no drug interactions are present. Mr. Radha Jessica was instructed to avoid any major changes in his general diet and to avoid alcohol consumption. .      Mr. Radha Jessica verbalized his understanding of all instructions and will call the office with any questions, concerns, or signs of abnormal bleeding or blood clot.

## 2021-09-15 ENCOUNTER — TELEPHONE ANTICOAG (OUTPATIENT)
Dept: CARDIOLOGY CLINIC | Age: 76
End: 2021-09-15

## 2021-09-15 LAB — INR, EXTERNAL: 1.7

## 2021-09-15 NOTE — PATIENT INSTRUCTIONS
Mr. Kanchan August is here today for anticoagulation monitoring for the diagnosis of Atrial Fibrillation. His INR goal is 2.0-3.0 and his current Coumadin dose is 7.5 mg daily. Today's findings include an INR of 1.7    Considering Mr. Daigle's past history, todays findings, and per the coumadin policy/protocol, Mr. Charanjit Parr was instructed to take Coumadin as follows,  10 mg tonight then 7.5 mg daily. He was also instructed to come back in 1 weeks for an INR check. A full discussion of the nature of anticoagulants has been carried out. A full discussion of the need for frequent and regular monitoring, precise dosage adjustment and compliance was stressed. Side effects of potential bleeding were discussed and Mr. Charanjit Parr was instructed to call [phone] if there are any signs of abnormal bleeding. Mr. Charanjit Parr was instructed to avoid any OTC items containing aspirin or ibuprofen and prior to starting any new OTC products to consult with his physician or pharmacist to ensure no drug interactions are present. Mr. Charanjit Parr was instructed to avoid any major changes in his general diet and to avoid alcohol consumption. .      Mr. Charanjit Parr verbalized his understanding of all instructions and will call the office with any questions, concerns, or signs of abnormal bleeding or blood clot.

## 2021-09-16 RX ORDER — WARFARIN SODIUM 5 MG/1
TABLET ORAL
Qty: 105 TABLET | Refills: 0 | Status: SHIPPED | OUTPATIENT
Start: 2021-09-16 | End: 2022-07-01 | Stop reason: SDUPTHER

## 2021-09-22 ENCOUNTER — TELEPHONE ANTICOAG (OUTPATIENT)
Dept: CARDIOLOGY CLINIC | Age: 76
End: 2021-09-22

## 2021-09-22 DIAGNOSIS — I48.91 ATRIAL FIBRILLATION, UNSPECIFIED TYPE (HCC): Primary | ICD-10-CM

## 2021-09-22 LAB — INR, EXTERNAL: 2.5

## 2021-09-22 NOTE — PATIENT INSTRUCTIONS
Mr. Brian Justice is here today for anticoagulation monitoring for the diagnosis of Atrial Fibrillation. His INR goal is 2.0-3.0 and his current Coumadin dose is 7.5 mg daily. Today's findings include an INR of 2.5    Considering Mr. Daigle's past history, todays findings, and per the coumadin policy/protocol, Mr. Pavel Koo was instructed to take Coumadin as follows,  Resume 7.5 mg daily. He was also instructed to come back in 1 weeks for an INR check. A full discussion of the nature of anticoagulants has been carried out. A full discussion of the need for frequent and regular monitoring, precise dosage adjustment and compliance was stressed. Side effects of potential bleeding were discussed and Mr. Pavel Koo was instructed to call [phone] if there are any signs of abnormal bleeding. Mr. Pavel Koo was instructed to avoid any OTC items containing aspirin or ibuprofen and prior to starting any new OTC products to consult with his physician or pharmacist to ensure no drug interactions are present. Mr. Pavel Koo was instructed to avoid any major changes in his general diet and to avoid alcohol consumption. .      Mr. Pavel Koo verbalized his understanding of all instructions and will call the office with any questions, concerns, or signs of abnormal bleeding or blood clot.

## 2021-09-29 LAB — INR, EXTERNAL: 2.2

## 2021-09-30 ENCOUNTER — TELEPHONE ANTICOAG (OUTPATIENT)
Dept: CARDIOLOGY CLINIC | Age: 76
End: 2021-09-30

## 2021-09-30 DIAGNOSIS — I48.91 ATRIAL FIBRILLATION, UNSPECIFIED TYPE (HCC): Primary | ICD-10-CM

## 2021-09-30 NOTE — PATIENT INSTRUCTIONS
Mr. Nely Silva is here today for anticoagulation monitoring for the diagnosis of Atrial Fibrillation. His INR goal is 2.0-3.0 and his current Coumadin dose is 7.5 mg daily. Today's findings include an INR of 2.2     Considering Mr. Daigle's past history, todays findings, and per the coumadin policy/protocol, Mr. Hi Linder was instructed to take Coumadin as follows,  Resume current dose of coumadin. He was also instructed to come back in 1 weeks for an INR check. A full discussion of the nature of anticoagulants has been carried out. A full discussion of the need for frequent and regular monitoring, precise dosage adjustment and compliance was stressed. Side effects of potential bleeding were discussed and Mr. Hi Linder was instructed to call [phone] if there are any signs of abnormal bleeding. Mr. Hi Linder was instructed to avoid any OTC items containing aspirin or ibuprofen and prior to starting any new OTC products to consult with his physician or pharmacist to ensure no drug interactions are present. Mr. Hi Linder was instructed to avoid any major changes in his general diet and to avoid alcohol consumption. .      Mr. Hi Linder verbalized his understanding of all instructions and will call the office with any questions, concerns, or signs of abnormal bleeding or blood clot.

## 2021-10-07 ENCOUNTER — TELEPHONE ANTICOAG (OUTPATIENT)
Dept: CARDIOLOGY CLINIC | Age: 76
End: 2021-10-07

## 2021-10-07 DIAGNOSIS — I48.91 ATRIAL FIBRILLATION, UNSPECIFIED TYPE (HCC): Primary | ICD-10-CM

## 2021-10-07 LAB — INR, EXTERNAL: 1.8

## 2021-10-07 NOTE — PATIENT INSTRUCTIONS
Mr. Baudilio Baltazar is here today for anticoagulation monitoring for the diagnosis of Atrial Fibrillation. His INR goal is 2.0-3.0 and his current Coumadin dose is 7.5 mg daily. Today's findings include an INR of 1.8    Considering Mr. Daigle's past history, todays findings, and per the coumadin policy/protocol, Mr. Marilee Lee was instructed to take Coumadin as follows,  Take 10 mg tonight then 7.5 mg daily. He was also instructed to come back in 1 weeks for an INR check. A full discussion of the nature of anticoagulants has been carried out. A full discussion of the need for frequent and regular monitoring, precise dosage adjustment and compliance was stressed. Side effects of potential bleeding were discussed and Mr. Marilee Lee was instructed to call [phone] if there are any signs of abnormal bleeding. Mr. Marilee Lee was instructed to avoid any OTC items containing aspirin or ibuprofen and prior to starting any new OTC products to consult with his physician or pharmacist to ensure no drug interactions are present. Mr. Marilee Lee was instructed to avoid any major changes in his general diet and to avoid alcohol consumption. .      Mr. Marilee Lee verbalized his understanding of all instructions and will call the office with any questions, concerns, or signs of abnormal bleeding or blood clot.

## 2021-10-11 DIAGNOSIS — I48.92 ATRIAL FLUTTER, UNSPECIFIED TYPE (HCC): ICD-10-CM

## 2021-10-11 RX ORDER — DIGOXIN 125 MCG
TABLET ORAL
Qty: 90 TABLET | Refills: 0 | Status: SHIPPED | OUTPATIENT
Start: 2021-10-11 | End: 2022-01-20

## 2021-10-14 ENCOUNTER — TELEPHONE ANTICOAG (OUTPATIENT)
Dept: CARDIOLOGY CLINIC | Age: 76
End: 2021-10-14

## 2021-10-14 DIAGNOSIS — I48.0 PAROXYSMAL ATRIAL FIBRILLATION (HCC): Primary | ICD-10-CM

## 2021-10-14 LAB — INR, EXTERNAL: 2.3

## 2021-10-14 NOTE — PATIENT INSTRUCTIONS
Mr. Gilles Cabrera is here today for anticoagulation monitoring for the diagnosis of Atrial Fibrillation. His INR goal is 2.0-3.0 and his current Coumadin dose is 7.5 mg daily. Today's findings include an INR of 2.3     Considering Mr. Daigle's past history, todays findings, and per the coumadin policy/protocol, Mr. Sneha Ledbetter was instructed to take Coumadin as follows,  Continue taking 7.5 mg daily. He was also instructed to come back in 1 weeks for an INR check. A full discussion of the nature of anticoagulants has been carried out. A full discussion of the need for frequent and regular monitoring, precise dosage adjustment and compliance was stressed. Side effects of potential bleeding were discussed and Mr. Sneha Ledbetter was instructed to call [phone] if there are any signs of abnormal bleeding. Mr. Sneha Ledbetter was instructed to avoid any OTC items containing aspirin or ibuprofen and prior to starting any new OTC products to consult with his physician or pharmacist to ensure no drug interactions are present. Mr. Sneha Ledbetter was instructed to avoid any major changes in his general diet and to avoid alcohol consumption. .      Mr. Sneha Ledbetter verbalized his understanding of all instructions and will call the office with any questions, concerns, or signs of abnormal bleeding or blood clot.

## 2021-10-21 ENCOUNTER — TELEPHONE ANTICOAG (OUTPATIENT)
Dept: CARDIOLOGY CLINIC | Age: 76
End: 2021-10-21

## 2021-10-21 DIAGNOSIS — I48.91 ATRIAL FIBRILLATION, UNSPECIFIED TYPE (HCC): Primary | ICD-10-CM

## 2021-10-21 LAB — INR, EXTERNAL: 2.2

## 2021-10-28 ENCOUNTER — TELEPHONE ANTICOAG (OUTPATIENT)
Dept: CARDIOLOGY CLINIC | Age: 76
End: 2021-10-28

## 2021-10-28 DIAGNOSIS — I48.91 ATRIAL FIBRILLATION, UNSPECIFIED TYPE (HCC): Primary | ICD-10-CM

## 2021-10-28 LAB — INR, EXTERNAL: 2.2

## 2021-10-28 NOTE — PATIENT INSTRUCTIONS
Mr. Juan Miguel Nguyen is here today for anticoagulation monitoring for the diagnosis of Atrial Fibrillation. His INR goal is 2.0-3.0 and his current Coumadin dose is 7.5 mg. Today's findings include an INR of 2.2    Considering Mr. Daigle's past history, todays findings, and per the coumadin policy/protocol, Mr. Karyna Frances was instructed to take Coumadin as follows,  Resume same dose. He was also instructed to come back in 1 weeks for an INR check. A full discussion of the nature of anticoagulants has been carried out. A full discussion of the need for frequent and regular monitoring, precise dosage adjustment and compliance was stressed. Side effects of potential bleeding were discussed and Mr. Karyna Frances was instructed to call [phone] if there are any signs of abnormal bleeding. Mr. Karyna Frances was instructed to avoid any OTC items containing aspirin or ibuprofen and prior to starting any new OTC products to consult with his physician or pharmacist to ensure no drug interactions are present. Mr. Karyna Frances was instructed to avoid any major changes in his general diet and to avoid alcohol consumption. .      Mr. Karyna Frances verbalized his understanding of all instructions and will call the office with any questions, concerns, or signs of abnormal bleeding or blood clot.

## 2021-11-04 ENCOUNTER — TELEPHONE ANTICOAG (OUTPATIENT)
Dept: CARDIOLOGY CLINIC | Age: 76
End: 2021-11-04

## 2021-11-04 DIAGNOSIS — I48.91 ATRIAL FIBRILLATION, UNSPECIFIED TYPE (HCC): Primary | ICD-10-CM

## 2021-11-04 LAB — INR, EXTERNAL: 2.1

## 2021-11-04 NOTE — PATIENT INSTRUCTIONS
Mr. Michael Portillo is here today for anticoagulation monitoring for the diagnosis of Atrial Fibrillation. His INR goal is 2.0-3.0 and his current Coumadin dose is 7.5 mg daily. Today's findings include an INR of 2.1     Considering Mr. Daigle's past history, todays findings, and per the coumadin policy/protocol, Mr. Tanya Wilson was instructed to take Coumadin as follows,  Resume same dose. He was also instructed to come back in 1 weeks for an INR check. A full discussion of the nature of anticoagulants has been carried out. A full discussion of the need for frequent and regular monitoring, precise dosage adjustment and compliance was stressed. Side effects of potential bleeding were discussed and Mr. Tanya Wilson was instructed to call [phone] if there are any signs of abnormal bleeding. Mr. Tanya Wilson was instructed to avoid any OTC items containing aspirin or ibuprofen and prior to starting any new OTC products to consult with his physician or pharmacist to ensure no drug interactions are present. Mr. Tanya Wilson was instructed to avoid any major changes in his general diet and to avoid alcohol consumption. .      Mr. Tanya Wilson verbalized his understanding of all instructions and will call the office with any questions, concerns, or signs of abnormal bleeding or blood clot.

## 2021-11-11 ENCOUNTER — TELEPHONE ANTICOAG (OUTPATIENT)
Dept: CARDIOLOGY CLINIC | Age: 76
End: 2021-11-11

## 2021-11-11 DIAGNOSIS — I48.91 ATRIAL FIBRILLATION, UNSPECIFIED TYPE (HCC): Primary | ICD-10-CM

## 2021-11-11 LAB — INR, EXTERNAL: 2.2

## 2021-11-11 NOTE — PATIENT INSTRUCTIONS
Mr. Gilles Cabrera is here today for anticoagulation monitoring for the diagnosis of Atrial Fibrillation. His INR goal is 2.0-3.0 and his current Coumadin dose is 7.5mg daily. Today's findings include an INR of 2.2    Considering Mr. Daigle's past history, todays findings, and per the coumadin policy/protocol, Mr. Sneha Ledbetter was instructed to take Coumadin as follows,  Resume same dose. He was also instructed to come back in 1 weeks for an INR check. A full discussion of the nature of anticoagulants has been carried out. A full discussion of the need for frequent and regular monitoring, precise dosage adjustment and compliance was stressed. Side effects of potential bleeding were discussed and Mr. Sneha Ledbetter was instructed to call [phone] if there are any signs of abnormal bleeding. Mr. Sneha Ledbetter was instructed to avoid any OTC items containing aspirin or ibuprofen and prior to starting any new OTC products to consult with his physician or pharmacist to ensure no drug interactions are present. Mr. Sneha Ledbetter was instructed to avoid any major changes in his general diet and to avoid alcohol consumption. .      Mr. Sneha Ledbetter verbalized his understanding of all instructions and will call the office with any questions, concerns, or signs of abnormal bleeding or blood clot.

## 2021-11-18 ENCOUNTER — TELEPHONE ANTICOAG (OUTPATIENT)
Dept: CARDIOLOGY CLINIC | Age: 76
End: 2021-11-18

## 2021-11-18 DIAGNOSIS — I48.91 ATRIAL FIBRILLATION, UNSPECIFIED TYPE (HCC): Primary | ICD-10-CM

## 2021-11-18 LAB — INR, EXTERNAL: 2

## 2021-11-18 NOTE — PATIENT INSTRUCTIONS
Mr. Hill Villanueva is here today for anticoagulation monitoring for the diagnosis of Atrial Fibrillation. His INR goal is 2.0-3.0 and his current Coumadin dose is 7.5 . Today's findings include an INR of 2.0     Considering Mr. Daigle's past history, todays findings, and per the coumadin policy/protocol, Mr. Urvashi Arechiga was instructed to take Coumadin as follows,  Resume same dose. He was also instructed to come back in 1 weeks for an INR check. A full discussion of the nature of anticoagulants has been carried out. A full discussion of the need for frequent and regular monitoring, precise dosage adjustment and compliance was stressed. Side effects of potential bleeding were discussed and Mr. Urvashi Arechiga was instructed to call [phone] if there are any signs of abnormal bleeding. Mr. Urvashi Arechiga was instructed to avoid any OTC items containing aspirin or ibuprofen and prior to starting any new OTC products to consult with his physician or pharmacist to ensure no drug interactions are present. Mr. Urvashi Arechiga was instructed to avoid any major changes in his general diet and to avoid alcohol consumption. .      Mr. Urvashi Arechiga verbalized his understanding of all instructions and will call the office with any questions, concerns, or signs of abnormal bleeding or blood clot.

## 2021-11-23 RX ORDER — WARFARIN SODIUM 5 MG/1
TABLET ORAL
Qty: 105 TABLET | Refills: 0 | Status: SHIPPED | OUTPATIENT
Start: 2021-11-23 | End: 2022-07-01 | Stop reason: SDUPTHER

## 2021-11-24 ENCOUNTER — TELEPHONE ANTICOAG (OUTPATIENT)
Dept: CARDIOLOGY CLINIC | Age: 76
End: 2021-11-24

## 2021-11-24 LAB — INR, EXTERNAL: 2.2

## 2021-11-24 NOTE — PATIENT INSTRUCTIONS
Mr. Hill Villanueva is here today for anticoagulation monitoring for the diagnosis of Atrial Fibrillation. His INR goal is 2.0-3.0 and his current Coumadin dose is 7.5 mg daily. Today's findings include an INR of 2.2     Considering Mr. Daigle's past history, todays findings, and per the coumadin policy/protocol, Mr. Urvashi Arechiga was instructed to take Coumadin as follows,  Continue taking 7.5 mg daily. He was also instructed to come back in 1 weeks for an INR check. A full discussion of the nature of anticoagulants has been carried out. A full discussion of the need for frequent and regular monitoring, precise dosage adjustment and compliance was stressed. Side effects of potential bleeding were discussed and Mr. Urvashi Arechiga was instructed to call [phone] if there are any signs of abnormal bleeding. Mr. Urvashi Arechiga was instructed to avoid any OTC items containing aspirin or ibuprofen and prior to starting any new OTC products to consult with his physician or pharmacist to ensure no drug interactions are present. Mr. Urvashi Arechiga was instructed to avoid any major changes in his general diet and to avoid alcohol consumption. .      Mr. Urvashi Arechiga verbalized his understanding of all instructions and will call the office with any questions, concerns, or signs of abnormal bleeding or blood clot.

## 2021-12-01 ENCOUNTER — TELEPHONE ANTICOAG (OUTPATIENT)
Dept: CARDIOLOGY CLINIC | Age: 76
End: 2021-12-01

## 2021-12-01 LAB — INR, EXTERNAL: 2.6

## 2021-12-01 NOTE — PATIENT INSTRUCTIONS
Mr. Baudilio Baltazar is here today for anticoagulation monitoring for the diagnosis of Atrial Fibrillation. His INR goal is 2.0-3.0 and his current Coumadin dose is 7.5 mg daily. Today's findings include an INR of 2.6     Considering Mr. Daigle's past history, todays findings, and per the coumadin policy/protocol, Mr. Marilee Lee was instructed to take Coumadin as follows,  Continue taking 7.5 mg daily. He was also instructed to come back in 1 weeks for an INR check. A full discussion of the nature of anticoagulants has been carried out. A full discussion of the need for frequent and regular monitoring, precise dosage adjustment and compliance was stressed. Side effects of potential bleeding were discussed and Mr. Marilee Lee was instructed to call [phone] if there are any signs of abnormal bleeding. Mr. Marilee Lee was instructed to avoid any OTC items containing aspirin or ibuprofen and prior to starting any new OTC products to consult with his physician or pharmacist to ensure no drug interactions are present. Mr. Marilee Lee was instructed to avoid any major changes in his general diet and to avoid alcohol consumption. .      Mr. Marilee Lee verbalized his understanding of all instructions and will call the office with any questions, concerns, or signs of abnormal bleeding or blood clot.

## 2021-12-08 ENCOUNTER — TELEPHONE ANTICOAG (OUTPATIENT)
Dept: CARDIOLOGY CLINIC | Age: 76
End: 2021-12-08

## 2021-12-08 ENCOUNTER — OFFICE VISIT (OUTPATIENT)
Dept: CARDIOLOGY CLINIC | Age: 76
End: 2021-12-08
Payer: MEDICARE

## 2021-12-08 VITALS
WEIGHT: 155 LBS | DIASTOLIC BLOOD PRESSURE: 89 MMHG | HEART RATE: 68 BPM | BODY MASS INDEX: 22.96 KG/M2 | HEIGHT: 69 IN | SYSTOLIC BLOOD PRESSURE: 148 MMHG

## 2021-12-08 DIAGNOSIS — I11.9 BENIGN HYPERTENSIVE HEART DISEASE WITHOUT HEART FAILURE: Primary | ICD-10-CM

## 2021-12-08 DIAGNOSIS — I48.91 ATRIAL FIBRILLATION, UNSPECIFIED TYPE (HCC): Primary | ICD-10-CM

## 2021-12-08 DIAGNOSIS — I48.92 ATRIAL FLUTTER, UNSPECIFIED TYPE (HCC): ICD-10-CM

## 2021-12-08 DIAGNOSIS — E11.9 TYPE 2 DIABETES MELLITUS WITHOUT COMPLICATION, UNSPECIFIED WHETHER LONG TERM INSULIN USE (HCC): ICD-10-CM

## 2021-12-08 DIAGNOSIS — I34.0 MODERATE MITRAL REGURGITATION: ICD-10-CM

## 2021-12-08 DIAGNOSIS — E03.4 HYPOTHYROIDISM DUE TO ACQUIRED ATROPHY OF THYROID: ICD-10-CM

## 2021-12-08 LAB — INR, EXTERNAL: 2.1

## 2021-12-08 PROCEDURE — 2022F DILAT RTA XM EVC RTNOPTHY: CPT | Performed by: INTERNAL MEDICINE

## 2021-12-08 PROCEDURE — G8510 SCR DEP NEG, NO PLAN REQD: HCPCS | Performed by: INTERNAL MEDICINE

## 2021-12-08 PROCEDURE — G8428 CUR MEDS NOT DOCUMENT: HCPCS | Performed by: INTERNAL MEDICINE

## 2021-12-08 PROCEDURE — G8536 NO DOC ELDER MAL SCRN: HCPCS | Performed by: INTERNAL MEDICINE

## 2021-12-08 PROCEDURE — 99214 OFFICE O/P EST MOD 30 MIN: CPT | Performed by: INTERNAL MEDICINE

## 2021-12-08 PROCEDURE — 3017F COLORECTAL CA SCREEN DOC REV: CPT | Performed by: INTERNAL MEDICINE

## 2021-12-08 PROCEDURE — 3046F HEMOGLOBIN A1C LEVEL >9.0%: CPT | Performed by: INTERNAL MEDICINE

## 2021-12-08 PROCEDURE — 93000 ELECTROCARDIOGRAM COMPLETE: CPT | Performed by: INTERNAL MEDICINE

## 2021-12-08 PROCEDURE — G8420 CALC BMI NORM PARAMETERS: HCPCS | Performed by: INTERNAL MEDICINE

## 2021-12-08 PROCEDURE — 1101F PT FALLS ASSESS-DOCD LE1/YR: CPT | Performed by: INTERNAL MEDICINE

## 2021-12-08 NOTE — PROGRESS NOTES
Mr. Baudilio Baltazar is here today for anticoagulation monitoring for the diagnosis of Atrial Fibrillation. His INR goal is 2.0-3.0 and his current Coumadin dose is 7.5 mg every day. Today's findings include an INR of 2.1     Considering Mr. Daigle's past history, todays findings, and per the coumadin policy/protocol, Mr. Marilee Lee was instructed to take Coumadin as follows,  Same dose. He was also instructed to come back in 1 weeks for an INR check. A full discussion of the nature of anticoagulants has been carried out. A full discussion of the need for frequent and regular monitoring, precise dosage adjustment and compliance was stressed. Side effects of potential bleeding were discussed and Mr. Marilee Lee was instructed to call [phone] if there are any signs of abnormal bleeding. Mr. Marilee Lee was instructed to avoid any OTC items containing aspirin or ibuprofen and prior to starting any new OTC products to consult with his physician or pharmacist to ensure no drug interactions are present. Mr. Marilee Lee was instructed to avoid any major changes in his general diet and to avoid alcohol consumption. .      Mr. Marilee Lee was left detailed message of all instructions and will call the office with any questions, concerns, or signs of abnormal bleeding or blood clot.

## 2021-12-08 NOTE — PROGRESS NOTES
HISTORY OF PRESENT ILLNESS  Ty Hardin is a 78 y.o. male. Palpitations   The history is provided by the patient. This is a chronic problem. The current episode started more than 1 week ago. The problem has not changed since onset. The problem occurs daily. Pertinent negatives include no diaphoresis, no fever, no claudication, no near-syncope, no orthopnea, no PND, no syncope, no nausea, no vomiting, no leg pain, no dizziness, no weakness, no cough, no hemoptysis and no sputum production. Risk factors include dyslipidemia, hypertension and male gender. His past medical history is significant for atrial fibrillation. His past medical history does not include hyperthyroidism. Shortness of Breath  The history is provided by the patient. This is a chronic problem. The problem occurs intermittently. The current episode started more than 1 week ago. The problem has not changed since onset. Pertinent negatives include no fever, no ear pain, no neck pain, no cough, no sputum production, no hemoptysis, no wheezing, no PND, no orthopnea, no syncope, no vomiting, no rash, no leg pain, no leg swelling and no claudication. He has had prior hospitalizations. Associated medical issues do not include chronic lung disease, CAD or heart failure. Review of Systems   Constitutional: Negative for chills, diaphoresis, fever and weight loss. HENT: Negative for ear pain and hearing loss. Eyes: Negative for blurred vision. Respiratory: Negative for cough, hemoptysis, sputum production, wheezing and stridor. Cardiovascular: Positive for palpitations. Negative for orthopnea, claudication, leg swelling, syncope, PND and near-syncope. Gastrointestinal: Negative for heartburn, nausea and vomiting. Musculoskeletal: Negative for myalgias and neck pain. Skin: Negative for rash. Neurological: Negative for dizziness, tingling, tremors, focal weakness, loss of consciousness and weakness.    Psychiatric/Behavioral: Negative for depression and suicidal ideas. Family History   Problem Relation Age of Onset    Hypertension Mother     Heart Attack Father     Pacemaker Father        Past Medical History:   Diagnosis Date    AF (atrial fibrillation) ([facility] [address] 9/3/09    Atrial flutter (HCC)     BPH (benign prostatic hypertrophy) 9/3/09    Carotid duplex 05    No stenosis bilaterally     Diabetes mellitus, type 2 ([facility] [address]     Echocardiogram 10/27/2011    EF 60-65%. Gr 1 DDfx. RVSP 30 mmHg. Mild LAE. Mild MR. Diastolic doming of mitral valve. Mild IVCE.     Elevated PSA     Hypercholesterolemia     dislipidemia    Hypertension     Hypothyroidism     on replacement therapy    Long term (current) use of anticoagulants 3/16/2011    Lower extremity arterial duplex 09    No evidence of arterial insufficiency bilaterally at rest    Paroxysmal atrial fibrillation (HCC)     Personal history of urinary calculi 9/3/09    Stress thallium 08    Very small scarring in basal inferior wall w/min ischemia likely artifact; EF 68%; rapid AF during exercise; EKG portion negative which was terminated prematurely because of the persistent rapid AF       Past Surgical History:   Procedure Laterality Date    HX HERNIA REPAIR      , left; , right    HX LITHOTRIPSY  10/2004    Removed kidney stones    HX OTHER SURGICAL  2015    Scar tissue removed from bowels    HX TONSILLECTOMY      age 12   [facility]      Colon surgical repair following puncture during removal of polyps       Social History     Tobacco Use    Smoking status: Former Smoker     Packs/day: 1.50     Years: 35.00     Pack years: 52.50     Quit date: 10/11/1980     Years since quittin.1    Smokeless tobacco: Never Used   Substance Use Topics    Alcohol use: No       Allergies   Allergen Reactions    Iodinated Contrast Media Unknown (comments)     States no allergy to iodine    Other Medication Unknown (comments) Anesthesia (nausea)       Outpatient Medications Marked as Taking for the 12/8/21 encounter (Office Visit) with Kyrie Cox MD   Medication Sig Dispense Refill    digoxin (LANOXIN) 0.125 mg tablet TAKE 1 TABLET ONCE DAILY FOR HEART 90 Tablet 0    dutasteride (AVODART) 0.5 mg capsule Take 1 capsule twice a week. 30 Capsule 3    metoprolol succinate (TOPROL-XL) 25 mg XL tablet TAKE (1) TABLET TWICE A  Tablet 2    insulin lispro protamin/lispro (HUMALOG MIX 50-50 SC) by SubCUTAneous route.  cholecalciferol (VITAMIN D3) (2,000 UNITS /50 MCG) cap capsule Take  by mouth two (2) times a day.  insulin lispro protamin/lispro (HUMALOG MIX 50-50 SC) by SubCUTAneous route.  BD VEO INSULIN SYRINGE UF 1/2 mL 31 gauge x 15/64\" syrg       warfarin (COUMADIN) 5 mg tablet Alternate 10mg and 7.5mg every evening or as directed by physician. 120 Tab 0    levothyroxine (SYNTHROID) 137 mcg tablet 125 mcg.  HUMALOG MIX 50-50 INSULN U-100 100 unit/mL (50-50) injection       lispro-protamine & lispro (HUMALOG MIX 50-50) 100 unit/mL (50-50) flexpen by SubCUTAneous route two (2) times a day. 22 units Q AM, 10 units QPM           Visit Vitals  BP (!) 148/89   Pulse 68   Ht 5' 9\" (1.753 m)   Wt 70.3 kg (155 lb)   BMI 22.89 kg/m²     Physical Exam  Constitutional:       General: He is not in acute distress. Appearance: He is well-developed. He is not diaphoretic. HENT:      Head: Atraumatic. Mouth/Throat:      Pharynx: No oropharyngeal exudate. Eyes:      General: No scleral icterus. Conjunctiva/sclera: Conjunctivae normal.   Neck:      Vascular: No JVD. Cardiovascular:      Rate and Rhythm: Normal rate. Rhythm irregular. Heart sounds: Murmur (3/6 holosystolic murmur best heard at mitral area with no radiation) heard. No gallop. Pulmonary:      Effort: Pulmonary effort is normal.      Breath sounds: Normal breath sounds. No stridor. No wheezing or rales.    Abdominal: Palpations: Abdomen is soft. Tenderness: There is no abdominal tenderness. There is no rebound. Musculoskeletal:         General: No tenderness. Normal range of motion. Cervical back: Neck supple. Skin:     General: Skin is warm. Neurological:      Mental Status: He is alert and oriented to person, place, and time. Motor: No abnormal muscle tone. Psychiatric:         Behavior: Behavior normal.       ekg atrial fibrillaton with controlled ventricular response. 04/09/19   ECHO ADULT COMPLETE 04/11/2019 4/11/2019    Narrative · Estimated left ventricular ejection fraction is 61 - 65%. No regional   wall motion abnormality noted. Moderate (grade 2) left ventricular   diastolic dysfunction. · Left atrial cavity size is moderately dilated. · Right ventricular cavity size is mildly dilated. · Right atrial cavity size is moderately dilated. · Aortic valve leaflet calcification present with reduced excursion. Mild   aortic valve stenosis is present. Mild aortic valve regurgitation is   present. · Mitral valve thickening. Mild to moderate mitral valve regurgitation. · Mild to moderate tricuspid valve regurgitation is present. There is no   evidence of pulmonary hypertension. · Mild pulmonic valve regurgitation is present. · Mild aortic root (3.9 cm) and ascending aorta (4.2 cm) dilatation. · Mildly elevated central venous pressure (5-10 mmHg); IVC diameter is   less than 21 mm and collapses less than 50% with respiration. Signed by: Otto Ghosh MD     10/12/20   ECHO ADULT COMPLETE 10/15/2020 10/15/2020    Narrative · LV: Estimated LVEF is 55 - 60%. Normal cavity size, wall thickness and   systolic function (ejection fraction normal). Wall motion: normal. Severe   (grade 3) left ventricular diastolic dysfunction. · LA: Severely dilated left atrium. Left Atrium volume index is 62.92   mL/m2. · RA: Moderately dilated right atrium.   · AV: Aortic valve leaflet calcification present. Aortic valve mean   gradient is 8.2 mmHg. Aortic valve area is 1.8 cm2. Mild aortic valve   stenosis is present. · MV: Mitral valve thickening. Mitral annular calcification. Mild to   moderate mitral valve regurgitation is present. · TV: Moderate tricuspid valve regurgitation is present. · PV: Mild pulmonic valve regurgitation is present. · AO: Mild aortic root and ascending aorta dilatation. Aortic root   diameter = 4.0 cm. Ascending aorta diameter = 4.2 cm. · PA: Pulmonary arterial systolic pressure is 27 mmHg. Signed by: Colt Aparicio MD     ASSESSMENT and PLAN    ICD-10-CM ICD-9-CM    1. Benign hypertensive heart disease without heart failure  I11.9 402.10 AMB POC EKG ROUTINE W/ 12 LEADS, INTER & REP   2. chronic atrial fibrillation  I48.92 427.32    3. Moderate mitral regurgitation  I34.0 424.0 ECHO ADULT COMPLETE   4. Hypothyroidism due to acquired atrophy of thyroid  E03.4 244.8      246.8    5. Type 2 diabetes mellitus without complication, unspecified whether long term insulin use (HCC)  E11.9 250.00      Follow-up and Dispositions    · Return in about 6 months (around 6/8/2022). Patient on lopressor and digoxin for rate control. Continue coumadin for anticoagulation- INR today 2.1. Recent digoxin level wnl. Has mild to moderate mitral regurgitation with enlarged LA chamber size and mildly dilated aorta. Will repeat echo to assess LV function/ VHD. Reports well controlled bp at home.   Continue current meds

## 2021-12-08 NOTE — PROGRESS NOTES
1. Have you been to the ER, urgent care clinic since your last visit? Hospitalized since your last visit? No    2. Have you seen or consulted any other health care providers outside of the [address] [place] [address] since your last visit? Include any pap smears or colon screening.       Yes Where: [facility]/PCP

## 2021-12-15 ENCOUNTER — TELEPHONE ANTICOAG (OUTPATIENT)
Dept: CARDIOLOGY CLINIC | Age: 76
End: 2021-12-15

## 2021-12-15 DIAGNOSIS — I48.91 ATRIAL FIBRILLATION, UNSPECIFIED TYPE (HCC): Primary | ICD-10-CM

## 2021-12-15 LAB — INR, EXTERNAL: 2.2

## 2021-12-15 NOTE — PATIENT INSTRUCTIONS
Mr. Parisa Melchor is here today for anticoagulation monitoring for the diagnosis of Atrial Fibrillation. His INR goal is 2.0-3.0 and his current Coumadin dose is 7.5 mg daily. Today's findings include an INR of 2.2     Considering Mr. Daigle's past history, todays findings, and per the coumadin policy/protocol, Mr. Verenice Wick was instructed to take Coumadin as follows,  Resume same dose. He was also instructed to come back in 1 weeks for an INR check. A full discussion of the nature of anticoagulants has been carried out. A full discussion of the need for frequent and regular monitoring, precise dosage adjustment and compliance was stressed. Side effects of potential bleeding were discussed and Mr. Verenice Wick was instructed to call [phone] if there are any signs of abnormal bleeding. Mr. Verenice Wick was instructed to avoid any OTC items containing aspirin or ibuprofen and prior to starting any new OTC products to consult with his physician or pharmacist to ensure no drug interactions are present. Mr. Verenice Wick was instructed to avoid any major changes in his general diet and to avoid alcohol consumption. .      Mr. Verenice Wick verbalized his understanding of all instructions and will call the office with any questions, concerns, or signs of abnormal bleeding or blood clot.

## 2021-12-22 ENCOUNTER — TELEPHONE ANTICOAG (OUTPATIENT)
Dept: CARDIOLOGY CLINIC | Age: 76
End: 2021-12-22

## 2021-12-22 DIAGNOSIS — I48.91 ATRIAL FIBRILLATION, UNSPECIFIED TYPE (HCC): Primary | ICD-10-CM

## 2021-12-22 LAB — INR, EXTERNAL: 2.2

## 2021-12-22 NOTE — PROGRESS NOTES
Mr. Bhargavi Perez is here today for anticoagulation monitoring for the diagnosis of Atrial Fibrillation. His INR goal is 2.0-3.0 and his current Coumadin dose is 7.5 mg qd. Today's findings include an INR of 2.2     Considering Mr. Daigle's past history, todays findings, and per the coumadin policy/protocol, Mr. Ramón Lock was instructed to take Coumadin as follows,  Same dose. He was also instructed to come back in 1 weeks for an INR check. A full discussion of the nature of anticoagulants has been carried out. A full discussion of the need for frequent and regular monitoring, precise dosage adjustment and compliance was stressed. Side effects of potential bleeding were discussed and Mr. Ramón Lock was instructed to call [phone] if there are any signs of abnormal bleeding. Mr. Ramón Lock was instructed to avoid any OTC items containing aspirin or ibuprofen and prior to starting any new OTC products to consult with his physician or pharmacist to ensure no drug interactions are present. Mr. Ramón Lock was instructed to avoid any major changes in his general diet and to avoid alcohol consumption. .      Mr. Ramón Lock was left detailed message of all instructions and will call the office with any questions, concerns, or signs of abnormal bleeding or blood clot.

## 2021-12-28 NOTE — PROGRESS NOTES
Patient called in concern to coumadin dosing; no answer, voicemail left with instructions as follows. Per NP patient is to take  mg of coumadin 10 mg, then 7.5mg of coumadin, repeating every two days , with an INR recheck on Tuesday, February 5, 2019 . Patient reminded if any questions or concerns to call the office. Yes

## 2021-12-29 ENCOUNTER — TELEPHONE ANTICOAG (OUTPATIENT)
Dept: CARDIOLOGY CLINIC | Age: 76
End: 2021-12-29

## 2021-12-29 LAB — INR, EXTERNAL: 2.4

## 2021-12-29 NOTE — PATIENT INSTRUCTIONS
Mr. Desire Gould is here today for anticoagulation monitoring for the diagnosis of Atrial Fibrillation. His INR goal is 2.0-3.0 and his current Coumadin dose is 7.5 mg daily. Today's findings include an INR of 2.4     Considering Mr. Daigle's past history, todays findings, and per the coumadin policy/protocol, Mr. Ashley Botello was instructed to take Coumadin as follows,  Continue taking 7.5 mg daily. He was also instructed to come back in 1 weeks for an INR check. A full discussion of the nature of anticoagulants has been carried out. A full discussion of the need for frequent and regular monitoring, precise dosage adjustment and compliance was stressed. Side effects of potential bleeding were discussed and Mr. Ashley Botello was instructed to call [phone] if there are any signs of abnormal bleeding. Mr. Ashley Botello was instructed to avoid any OTC items containing aspirin or ibuprofen and prior to starting any new OTC products to consult with his physician or pharmacist to ensure no drug interactions are present. Mr. Ashley Botello was instructed to avoid any major changes in his general diet and to avoid alcohol consumption. .      Mr. Ashley Botello verbalized his understanding of all instructions and will call the office with any questions, concerns, or signs of abnormal bleeding or blood clot.

## 2022-01-05 ENCOUNTER — TELEPHONE ANTICOAG (OUTPATIENT)
Dept: CARDIOLOGY CLINIC | Age: 77
End: 2022-01-05

## 2022-01-05 LAB — INR, EXTERNAL: 2

## 2022-01-05 NOTE — PATIENT INSTRUCTIONS
Mr. Kisha Johnson is here today for anticoagulation monitoring for the diagnosis of Atrial Fibrillation. His INR goal is 2.0-3.0 and his current Coumadin dose is 7.5 mg. Today's findings include an INR of 2.0     Considering Mr. Daigle's past history, todays findings, and per the coumadin policy/protocol, Mr. Elizabet Sorensen was instructed to take Coumadin as follows,  7.5 mg every day. He was also instructed to come back in 1 weeks for an INR check. A full discussion of the nature of anticoagulants has been carried out. A full discussion of the need for frequent and regular monitoring, precise dosage adjustment and compliance was stressed. Side effects of potential bleeding were discussed and Mr. Elizabet Sorensen was instructed to call [phone] if there are any signs of abnormal bleeding. Mr. Elizabet Sorensen was instructed to avoid any OTC items containing aspirin or ibuprofen and prior to starting any new OTC products to consult with his physician or pharmacist to ensure no drug interactions are present. Mr. Elizabet Sorensen was instructed to avoid any major changes in his general diet and to avoid alcohol consumption. .      Mr. Elizabet Sorensen verbalized his understanding of all instructions and will call the office with any questions, concerns, or signs of abnormal bleeding or blood clot.

## 2022-01-12 ENCOUNTER — TELEPHONE ANTICOAG (OUTPATIENT)
Dept: CARDIOLOGY CLINIC | Age: 77
End: 2022-01-12

## 2022-01-12 DIAGNOSIS — I48.91 ATRIAL FIBRILLATION, UNSPECIFIED TYPE (HCC): Primary | ICD-10-CM

## 2022-01-12 LAB — INR, EXTERNAL: 2

## 2022-01-12 NOTE — PATIENT INSTRUCTIONS
Mr. Leandro Calle is here today for anticoagulation monitoring for the diagnosis of Atrial Fibrillation. His INR goal is 2.0-3.0 and his current Coumadin dose is 7.5mg every day. Today's findings include an INR of 2.0     Considering Mr. Daigle's past history, todays findings, and per the coumadin policy/protocol, Mr. Dakota Wong was instructed to take Coumadin as follows,  7.5mg every day. He was also instructed to come back in 1 week for an INR check. A full discussion of the nature of anticoagulants has been carried out. A full discussion of the need for frequent and regular monitoring, precise dosage adjustment and compliance was stressed. Side effects of potential bleeding were discussed and Mr. Dakota Wong was instructed to call [phone] if there are any signs of abnormal bleeding. Mr. Dakota Wong was instructed to avoid any OTC items containing aspirin or ibuprofen and prior to starting any new OTC products to consult with his physician or pharmacist to ensure no drug interactions are present. Mr. Dakota Wong was instructed to avoid any major changes in his general diet and to avoid alcohol consumption. .      Mr. Dakota Wong verbalized his understanding of all instructions and will call the office with any questions, concerns, or signs of abnormal bleeding or blood clot.

## 2022-01-19 ENCOUNTER — TELEPHONE ANTICOAG (OUTPATIENT)
Dept: CARDIOLOGY CLINIC | Age: 77
End: 2022-01-19

## 2022-01-19 DIAGNOSIS — I48.92 ATRIAL FLUTTER, UNSPECIFIED TYPE (HCC): ICD-10-CM

## 2022-01-19 DIAGNOSIS — I48.91 ATRIAL FIBRILLATION, UNSPECIFIED TYPE (HCC): Primary | ICD-10-CM

## 2022-01-19 LAB — INR, EXTERNAL: 1.9

## 2022-01-19 NOTE — PROGRESS NOTES
Mr. Tamiko Ervin is here today for anticoagulation monitoring for the diagnosis of Atrial Fibrillation. His INR goal is 2.0-3.0 and his current Coumadin dose is 7.5 every day . Today's findings include an INR of 1.9     Considering Mr. Daigle's past history, todays findings, and per the coumadin policy/protocol, Mr. Karthik Finch was instructed to take Coumadin as follows,  7.5 mg every day. He was also instructed to come back in 1 weeks for an INR check. A full discussion of the nature of anticoagulants has been carried out. A full discussion of the need for frequent and regular monitoring, precise dosage adjustment and compliance was stressed. Side effects of potential bleeding were discussed and Mr. Karthik Finch was instructed to call [phone] if there are any signs of abnormal bleeding. Mr. Karthik Finch was instructed to avoid any OTC items containing aspirin or ibuprofen and prior to starting any new OTC products to consult with his physician or pharmacist to ensure no drug interactions are present. Mr. Karthik Finch was instructed to avoid any major changes in his general diet and to avoid alcohol consumption. .      Mr. Karthik Finch verbalized his understanding of all instructions and will call the office with any questions, concerns, or signs of abnormal bleeding or blood clot.

## 2022-01-20 RX ORDER — DIGOXIN 125 MCG
TABLET ORAL
Qty: 90 TABLET | Refills: 0 | Status: SHIPPED | OUTPATIENT
Start: 2022-01-20 | End: 2022-08-09

## 2022-01-26 ENCOUNTER — TELEPHONE ANTICOAG (OUTPATIENT)
Dept: CARDIOLOGY CLINIC | Age: 77
End: 2022-01-26

## 2022-01-26 DIAGNOSIS — I48.91 ATRIAL FIBRILLATION, UNSPECIFIED TYPE (HCC): Primary | ICD-10-CM

## 2022-01-26 LAB — INR, EXTERNAL: 1.9

## 2022-01-26 NOTE — PATIENT INSTRUCTIONS
Mr. Torri Greenberg is here today for anticoagulation monitoring for the diagnosis of Atrial Fibrillation. His INR goal is 2.0-3.0 and his current Coumadin dose is 7.5mg every day. Today's findings include an INR of 1.9     Considering Mr. Daigle's past history, todays findings, and per the coumadin policy/protocol, Mr. Arely Tobar was instructed to take Coumadin as follows,  1/26 10mg, otherwise 7.5mg every day. He was also instructed to come back in 1 weeks for an INR check. A full discussion of the nature of anticoagulants has been carried out. A full discussion of the need for frequent and regular monitoring, precise dosage adjustment and compliance was stressed. Side effects of potential bleeding were discussed and Mr. Arely Tobar was instructed to call [phone] if there are any signs of abnormal bleeding. Mr. Arely Tobar was instructed to avoid any OTC items containing aspirin or ibuprofen and prior to starting any new OTC products to consult with his physician or pharmacist to ensure no drug interactions are present. Mr. Arely Tobar was instructed to avoid any major changes in his general diet and to avoid alcohol consumption. .      Mr. Arely Tobar verbalized his understanding of all instructions and will call the office with any questions, concerns, or signs of abnormal bleeding or blood clot.

## 2022-02-02 ENCOUNTER — TELEPHONE ANTICOAG (OUTPATIENT)
Dept: CARDIOLOGY CLINIC | Age: 77
End: 2022-02-02

## 2022-02-02 DIAGNOSIS — I48.91 ATRIAL FIBRILLATION, UNSPECIFIED TYPE (HCC): Primary | ICD-10-CM

## 2022-02-02 LAB — INR, EXTERNAL: 2.1

## 2022-02-02 RX ORDER — WARFARIN SODIUM 5 MG/1
TABLET ORAL
Qty: 105 TABLET | Refills: 0 | Status: SHIPPED | OUTPATIENT
Start: 2022-02-02 | End: 2022-07-01 | Stop reason: SDUPTHER

## 2022-02-02 NOTE — PATIENT INSTRUCTIONS
Mr. Elder Gavin is here today for anticoagulation monitoring for the diagnosis of Atrial Fibrillation. His INR goal is 2.0-3.0 and his current Coumadin dose is 7.5 mg daily. Today's findings include an INR of 2.1     Considering Mr. Daigle's past history, todays findings, and per the coumadin policy/protocol, Mr. Dejah Merino was instructed to take Coumadin as follows,  Resume 7.5 mg daily. He was also instructed to come back in 1 weeks for an INR check. A full discussion of the nature of anticoagulants has been carried out. A full discussion of the need for frequent and regular monitoring, precise dosage adjustment and compliance was stressed. Side effects of potential bleeding were discussed and Mr. Dejah Merino was instructed to call [phone] if there are any signs of abnormal bleeding. Mr. Dejah Merino was instructed to avoid any OTC items containing aspirin or ibuprofen and prior to starting any new OTC products to consult with his physician or pharmacist to ensure no drug interactions are present. Mr. Dejah Merino was instructed to avoid any major changes in his general diet and to avoid alcohol consumption. .      Mr. Dejah Merino verbalized his understanding of all instructions and will call the office with any questions, concerns, or signs of abnormal bleeding or blood clot.

## 2022-02-09 ENCOUNTER — TELEPHONE ANTICOAG (OUTPATIENT)
Dept: CARDIOLOGY CLINIC | Age: 77
End: 2022-02-09

## 2022-02-09 LAB — INR, EXTERNAL: 1.8

## 2022-02-16 ENCOUNTER — TELEPHONE ANTICOAG (OUTPATIENT)
Dept: CARDIOLOGY CLINIC | Age: 77
End: 2022-02-16

## 2022-02-16 DIAGNOSIS — I48.91 ATRIAL FIBRILLATION, UNSPECIFIED TYPE (HCC): Primary | ICD-10-CM

## 2022-02-16 LAB — INR, EXTERNAL: 1.7

## 2022-02-16 NOTE — PATIENT INSTRUCTIONS
Mr. Yocasta Ernandez is here today for anticoagulation monitoring for the diagnosis of Atrial Fibrillation. His INR goal is 2.0-3.0 and his current Coumadin dose is 7.5 mg every day. Today's findings include an INR of 1.7     Considering Mr. Daigle's past history, todays findings, and per the coumadin policy/protocol, Mr. Zeinab Flood was instructed to take Coumadin as follows,  2/16 10 mg, otherwise 7.5 mg every day. He was also instructed to come back in 1 week for an INR check. A full discussion of the nature of anticoagulants has been carried out. A full discussion of the need for frequent and regular monitoring, precise dosage adjustment and compliance was stressed. Side effects of potential bleeding were discussed and Mr. Zeinab Flood was instructed to call [phone] if there are any signs of abnormal bleeding. Mr. Zeinab Flood was instructed to avoid any OTC items containing aspirin or ibuprofen and prior to starting any new OTC products to consult with his physician or pharmacist to ensure no drug interactions are present. Mr. Zeinab Flood was instructed to avoid any major changes in his general diet and to avoid alcohol consumption. .      Mr. Zeinab Flood verbalized his understanding of all instructions and will call the office with any questions, concerns, or signs of abnormal bleeding or blood clot.

## 2022-02-23 ENCOUNTER — TELEPHONE ANTICOAG (OUTPATIENT)
Dept: CARDIOLOGY CLINIC | Age: 77
End: 2022-02-23

## 2022-02-23 DIAGNOSIS — I48.91 ATRIAL FIBRILLATION, UNSPECIFIED TYPE (HCC): Primary | ICD-10-CM

## 2022-02-23 LAB — INR, EXTERNAL: 2.2

## 2022-03-02 ENCOUNTER — TELEPHONE ANTICOAG (OUTPATIENT)
Dept: CARDIOLOGY CLINIC | Age: 77
End: 2022-03-02

## 2022-03-02 DIAGNOSIS — I48.91 ATRIAL FIBRILLATION, UNSPECIFIED TYPE (HCC): Primary | ICD-10-CM

## 2022-03-02 LAB — INR, EXTERNAL: 1.8

## 2022-03-02 NOTE — PATIENT INSTRUCTIONS
Mr. Desire Spear is here today for anticoagulation monitoring for the diagnosis of Atrial Fibrillation. His INR goal is 2.0-3.0 and his current Coumadin dose is 7.5 mg daily. Today's findings include an INR of 1.8     Considering Mr. Daigle's past history, todays findings, and per the coumadin policy/protocol, Mr. Dixon Pulido was instructed to take Coumadin as follows,  Take 10 mg tonight then resume 7.5 mg daily. He was also instructed to come back in 1 weeks for an INR check. A full discussion of the nature of anticoagulants has been carried out. A full discussion of the need for frequent and regular monitoring, precise dosage adjustment and compliance was stressed. Side effects of potential bleeding were discussed and Mr. Dixon Pulido was instructed to call [phone] if there are any signs of abnormal bleeding. Mr. Dixon Pulido was instructed to avoid any OTC items containing aspirin or ibuprofen and prior to starting any new OTC products to consult with his physician or pharmacist to ensure no drug interactions are present. Mr. Dixon Pulido was instructed to avoid any major changes in his general diet and to avoid alcohol consumption. .      Mr. Dixon Pulido verbalized his understanding of all instructions and will call the office with any questions, concerns, or signs of abnormal bleeding or blood clot.

## 2022-03-09 ENCOUNTER — TELEPHONE ANTICOAG (OUTPATIENT)
Dept: CARDIOLOGY CLINIC | Age: 77
End: 2022-03-09

## 2022-03-09 DIAGNOSIS — I48.91 ATRIAL FIBRILLATION, UNSPECIFIED TYPE (HCC): Primary | ICD-10-CM

## 2022-03-09 LAB — INR, EXTERNAL: 1.4

## 2022-03-09 NOTE — PATIENT INSTRUCTIONS
Mr. Juanito Miller is here today for anticoagulation monitoring for the diagnosis of Atrial Fibrillation. His INR goal is 2.0-3.0 and his current Coumadin dose is 7.5 mg daily. Today's findings include an INR of 1.4     Considering Mr. Daigle's past history, todays findings, and per the coumadin policy/protocol, Mr. Kenyetta Dunaway was instructed to take Coumadin as follows,  Take 10 mg for 2 days then start 7.5 mg daily except Sunday and Wednesday 10 mg. He was also instructed to come back in 1 weeks for an INR check. A full discussion of the nature of anticoagulants has been carried out. A full discussion of the need for frequent and regular monitoring, precise dosage adjustment and compliance was stressed. Side effects of potential bleeding were discussed and Mr. Kenyetta Dunaway was instructed to call [phone] if there are any signs of abnormal bleeding. Mr. Kenyetta Dunaway was instructed to avoid any OTC items containing aspirin or ibuprofen and prior to starting any new OTC products to consult with his physician or pharmacist to ensure no drug interactions are present. Mr. Kenyetta Dunaway was instructed to avoid any major changes in his general diet and to avoid alcohol consumption. .      Mr. Kenyetta Dunaway verbalized his understanding of all instructions and will call the office with any questions, concerns, or signs of abnormal bleeding or blood clot.

## 2022-03-16 ENCOUNTER — TELEPHONE ANTICOAG (OUTPATIENT)
Dept: CARDIOLOGY CLINIC | Age: 77
End: 2022-03-16

## 2022-03-16 DIAGNOSIS — I48.91 ATRIAL FIBRILLATION, UNSPECIFIED TYPE (HCC): Primary | ICD-10-CM

## 2022-03-16 LAB — INR, EXTERNAL: 1.9

## 2022-03-16 NOTE — PROGRESS NOTES
Mr. Yamileth Naylor is here today for anticoagulation monitoring for the diagnosis of Atrial Fibrillation. His INR goal is 2.0-3.0 and his current Coumadin dose is 7.5 mg every day 10 wed/sun. Today's findings include an INR of 1.9     Considering Mr. Daigle's past history, todays findings, and per the coumadin policy/protocol, Mr. Concepcion Costa was instructions to take Coumadin as follows,  Same dose. He was also instructed to come back in 1 weeks for an INR check. A full discussion of the nature of anticoagulants has been carried out. A full discussion of the need for frequent and regular monitoring, precise dosage adjustment and compliance was stressed. Side effects of potential bleeding were discussed and Mr. Concepcion Costa was instructed to call [phone] if there are any signs of abnormal bleeding. Mr. Concepcion Costa was instructed to avoid any OTC items containing aspirin or ibuprofen and prior to starting any new OTC products to consult with his physician or pharmacist to ensure no drug interactions are present. Mr. Concepcion Costa was instructed to avoid any major changes in his general diet and to avoid alcohol consumption. .      Mr. Concepcion Costa verbalized his understanding of all instructions and will call the office with any questions, concerns, or signs of abnormal bleeding or blood clot.

## 2022-03-18 PROBLEM — I34.0 MODERATE MITRAL REGURGITATION: Status: ACTIVE | Noted: 2017-03-01

## 2022-03-19 PROBLEM — E11.9 TYPE 2 DIABETES MELLITUS WITHOUT COMPLICATION (HCC): Status: ACTIVE | Noted: 2017-03-01

## 2022-03-23 ENCOUNTER — TELEPHONE ANTICOAG (OUTPATIENT)
Dept: CARDIOLOGY CLINIC | Age: 77
End: 2022-03-23

## 2022-03-23 LAB — INR, EXTERNAL: 1.8

## 2022-03-23 NOTE — PATIENT INSTRUCTIONS
Mr. Vonnie Talley is here today for anticoagulation monitoring for the diagnosis of Atrial Fibrillation. His INR goal is 2.0-3.0 and his current Coumadin dose is 10 mg Wednesday and Thursday. Today's findings include an INR of 1.8     Considering Mr. Daigle's past history, todays findings, and per the coumadin policy/protocol, Mr. Christi Beltrán was instructed to take Coumadin as follows,  Alternate 10 mg and 7.5 mg every 2 days. He was also instructed to come back in 1 weeks for an INR check. A full discussion of the nature of anticoagulants has been carried out. A full discussion of the need for frequent and regular monitoring, precise dosage adjustment and compliance was stressed. Side effects of potential bleeding were discussed and Mr. Christi Beltrán was instructed to call [phone] if there are any signs of abnormal bleeding. Mr. Christi Beltrán was instructed to avoid any OTC items containing aspirin or ibuprofen and prior to starting any new OTC products to consult with his physician or pharmacist to ensure no drug interactions are present. Mr. Christi Beltrán was instructed to avoid any major changes in his general diet and to avoid alcohol consumption. .      Mr. Christi Beltrán verbalized his understanding of all instructions and will call the office with any questions, concerns, or signs of abnormal bleeding or blood clot.

## 2022-03-30 ENCOUNTER — TELEPHONE ANTICOAG (OUTPATIENT)
Dept: CARDIOLOGY CLINIC | Age: 77
End: 2022-03-30

## 2022-03-30 DIAGNOSIS — I48.91 ATRIAL FIBRILLATION, UNSPECIFIED TYPE (HCC): Primary | ICD-10-CM

## 2022-03-30 LAB — INR, EXTERNAL: 2.7

## 2022-03-30 NOTE — PATIENT INSTRUCTIONS
Mr. Bill Sarkar is here today for anticoagulation monitoring for the diagnosis of Atrial Fibrillation. His INR goal is 2.0-3.0 and his current Coumadin dose is alternating 7.5 mg and 10 mg every 2 days. Today's findings include an INR of 2.7    Considering Mr. Daigle's past history, todays findings, and per the coumadin policy/protocol, Mr. Cony Davison was instructed to take Coumadin as follows,  Alternating 7.5 mg and 10 mg every 2 days. He was also instructed to come back in 1 weeks for an INR check. A full discussion of the nature of anticoagulants has been carried out. A full discussion of the need for frequent and regular monitoring, precise dosage adjustment and compliance was stressed. Side effects of potential bleeding were discussed and Mr. Cony Davison was instructed to call [phone] if there are any signs of abnormal bleeding. Mr. Cony Davison was instructed to avoid any OTC items containing aspirin or ibuprofen and prior to starting any new OTC products to consult with his physician or pharmacist to ensure no drug interactions are present. Mr. Cony Davison was instructed to avoid any major changes in his general diet and to avoid alcohol consumption. .      Mr. Cony Davison verbalized his understanding of all instructions and will call the office with any questions, concerns, or signs of abnormal bleeding or blood clot.

## 2022-04-06 ENCOUNTER — TELEPHONE ANTICOAG (OUTPATIENT)
Dept: CARDIOLOGY CLINIC | Age: 77
End: 2022-04-06

## 2022-04-06 LAB — INR, EXTERNAL: 2.4

## 2022-04-06 NOTE — PATIENT INSTRUCTIONS
Mr. Roman Young is here today for anticoagulation monitoring for the diagnosis of Atrial Fibrillation. His INR goal is 2.0-3.0 and his current Coumadin dose is 5 mg. Today's findings include an INR of 2.4     Considering Mr. Daigle's past history, todays findings, and per the coumadin policy/protocol, Mr. Felicia Peng was instructed to take Coumadin as follows,  10 mg, then 7.5 mg repeating every 2 days. He was also instructed to come back in 2 weeks for an INR check. A full discussion of the nature of anticoagulants has been carried out. A full discussion of the need for frequent and regular monitoring, precise dosage adjustment and compliance was stressed. Side effects of potential bleeding were discussed and Mr. Felicia Peng was instructed to call [phone] if there are any signs of abnormal bleeding. Mr. Felicia Peng was instructed to avoid any OTC items containing aspirin or ibuprofen and prior to starting any new OTC products to consult with his physician or pharmacist to ensure no drug interactions are present. Mr. Felicia Peng was instructed to avoid any major changes in his general diet and to avoid alcohol consumption. .      Mr. Felicia Peng verbalized his understanding of all instructions and will call the office with any questions, concerns, or signs of abnormal bleeding or blood clot.

## 2022-04-08 RX ORDER — WARFARIN SODIUM 5 MG/1
TABLET ORAL
Qty: 105 TABLET | Refills: 0 | Status: SHIPPED | OUTPATIENT
Start: 2022-04-08 | End: 2022-07-01 | Stop reason: SDUPTHER

## 2022-04-20 ENCOUNTER — TELEPHONE ANTICOAG (OUTPATIENT)
Dept: CARDIOLOGY CLINIC | Age: 77
End: 2022-04-20

## 2022-04-20 LAB — INR, EXTERNAL: 2.3

## 2022-04-20 NOTE — PROGRESS NOTES
Mr. Randi Leary is here today for anticoagulation monitoring for the diagnosis of Atrial Fibrillation. His INR goal is 2.0-3.0 and his current Coumadin dose is 5 mg. Today's findings include an INR of 2.3     Considering Mr. Daigle's past history, todays findings, and per the coumadin policy/protocol, Mr. Chad Regan was instructed to take Coumadin as follows,  10 mg, then 7.5 mg repeating every 2 days. He was also instructed to come back in 1 weeks for an INR check. A full discussion of the nature of anticoagulants has been carried out. A full discussion of the need for frequent and regular monitoring, precise dosage adjustment and compliance was stressed. Side effects of potential bleeding were discussed and Mr. Chad Regan was instructed to call [phone] if there are any signs of abnormal bleeding. Mr. Chad Regan was instructed to avoid any OTC items containing aspirin or ibuprofen and prior to starting any new OTC products to consult with his physician or pharmacist to ensure no drug interactions are present. Mr. Chad Regan was instructed to avoid any major changes in his general diet and to avoid alcohol consumption. .      Mr. Chad Regan verbalized his understanding of all instructions and will call the office with any questions, concerns, or signs of abnormal bleeding or blood clot.

## 2022-04-27 ENCOUNTER — TELEPHONE ANTICOAG (OUTPATIENT)
Dept: CARDIOLOGY CLINIC | Age: 77
End: 2022-04-27

## 2022-04-27 DIAGNOSIS — I48.91 ATRIAL FIBRILLATION, UNSPECIFIED TYPE (HCC): Primary | ICD-10-CM

## 2022-04-27 LAB — INR, EXTERNAL: 2.4

## 2022-04-27 NOTE — PATIENT INSTRUCTIONS
Mr. Cesar Li is here today for anticoagulation monitoring for the diagnosis of Atrial Fibrillation. His INR goal is 2.0-3.0 and his current Coumadin dose is alternating 7.5 mg and 10 mg every 2 days. Today's findings include an INR of 2.4    Considering Mr. Daigle's past history, todays findings, and per the coumadin policy/protocol, Mr. Radha Jessica was instructed to take Coumadin as follows,  Resume current dose of coumadin. He was also instructed to come back in 1 weeks for an INR check. A full discussion of the nature of anticoagulants has been carried out. A full discussion of the need for frequent and regular monitoring, precise dosage adjustment and compliance was stressed. Side effects of potential bleeding were discussed and Mr. Radha Jessica was instructed to call [phone] if there are any signs of abnormal bleeding. Mr. Radha Jessica was instructed to avoid any OTC items containing aspirin or ibuprofen and prior to starting any new OTC products to consult with his physician or pharmacist to ensure no drug interactions are present. Mr. Radha Jessica was instructed to avoid any major changes in his general diet and to avoid alcohol consumption. .      Mr. Radha Jessica verbalized his understanding of all instructions and will call the office with any questions, concerns, or signs of abnormal bleeding or blood clot.

## 2022-05-04 ENCOUNTER — TELEPHONE ANTICOAG (OUTPATIENT)
Dept: CARDIOLOGY CLINIC | Age: 77
End: 2022-05-04

## 2022-05-04 DIAGNOSIS — I48.91 ATRIAL FIBRILLATION, UNSPECIFIED TYPE (HCC): Primary | ICD-10-CM

## 2022-05-04 LAB — INR, EXTERNAL: 2.5

## 2022-05-04 NOTE — PATIENT INSTRUCTIONS
Mr. Magdalena Perez is here today for anticoagulation monitoring for the diagnosis of Atrial Fibrillation. His INR goal is 2.0-3.0 and his current Coumadin dose is alternating 10 mg and 7.5 mg every 2 days. Today's findings include an INR of 2.5     Considering Mr. Daigle's past history, todays findings, and per the coumadin policy/protocol, Mr. Adriana Canavan was instructed to take Coumadin as follows,  Resume current dose of coumadin. He was also instructed to come back in 1 weeks for an INR check. A full discussion of the nature of anticoagulants has been carried out. A full discussion of the need for frequent and regular monitoring, precise dosage adjustment and compliance was stressed. Side effects of potential bleeding were discussed and Mr. Adriana Canavan was instructed to call [phone] if there are any signs of abnormal bleeding. Mr. Adriana Canavan was instructed to avoid any OTC items containing aspirin or ibuprofen and prior to starting any new OTC products to consult with his physician or pharmacist to ensure no drug interactions are present. Mr. Adriana Canavan was instructed to avoid any major changes in his general diet and to avoid alcohol consumption. .      Mr. Adriana Canavan verbalized his understanding of all instructions and will call the office with any questions, concerns, or signs of abnormal bleeding or blood clot.

## 2022-05-11 ENCOUNTER — TELEPHONE ANTICOAG (OUTPATIENT)
Dept: CARDIOLOGY CLINIC | Age: 77
End: 2022-05-11

## 2022-05-11 DIAGNOSIS — I48.91 ATRIAL FIBRILLATION, UNSPECIFIED TYPE (HCC): Primary | ICD-10-CM

## 2022-05-11 LAB — INR, EXTERNAL: 3.1

## 2022-05-11 NOTE — PATIENT INSTRUCTIONS
Mr. Ryan Gonzalez is here today for anticoagulation monitoring for the diagnosis of Atrial Fibrillation. His INR goal is 2.0-3.0 and his current Coumadin dose is alternating 7.5 mg and 10 mg every 2 days. Today's findings include an INR of 3.1     Considering Mr. Daigle's past history, todays findings, and per the coumadin policy/protocol, Mr. Romeo Flores was instructed to take Coumadin as follows,  Resume current dose of coumadin. He was also instructed to come back in 1 weeks for an INR check. A full discussion of the nature of anticoagulants has been carried out. A full discussion of the need for frequent and regular monitoring, precise dosage adjustment and compliance was stressed. Side effects of potential bleeding were discussed and Mr. Romeo Flores was instructed to call [phone] if there are any signs of abnormal bleeding. Mr. Romeo Flores was instructed to avoid any OTC items containing aspirin or ibuprofen and prior to starting any new OTC products to consult with his physician or pharmacist to ensure no drug interactions are present. Mr. Romeo Flores was instructed to avoid any major changes in his general diet and to avoid alcohol consumption. .      Mr. Romeo Flores verbalized his understanding of all instructions and will call the office with any questions, concerns, or signs of abnormal bleeding or blood clot.

## 2022-05-18 ENCOUNTER — TELEPHONE ANTICOAG (OUTPATIENT)
Dept: CARDIOLOGY CLINIC | Age: 77
End: 2022-05-18

## 2022-05-18 DIAGNOSIS — I48.91 ATRIAL FIBRILLATION, UNSPECIFIED TYPE (HCC): Primary | ICD-10-CM

## 2022-05-18 LAB — INR, EXTERNAL: 2.3

## 2022-05-18 NOTE — PATIENT INSTRUCTIONS
Mr. Polly Cuadra is here today for anticoagulation monitoring for the diagnosis of Atrial Fibrillation. His INR goal is 2.0-3.0 and his current Coumadin dose is alternating 10 mg and 7.5 mg every 2 days. Today's findings include an INR of 2.3     Considering Mr. Daigle's past history, todays findings, and per the coumadin policy/protocol, Mr. Penny Hall was instructed to take Coumadin as follows,  Resume current dose of coumadin. He was also instructed to come back in 1 weeks for an INR check. A full discussion of the nature of anticoagulants has been carried out. A full discussion of the need for frequent and regular monitoring, precise dosage adjustment and compliance was stressed. Side effects of potential bleeding were discussed and Mr. Penny Hall was instructed to call [phone] if there are any signs of abnormal bleeding. Mr. Penny Hall was instructed to avoid any OTC items containing aspirin or ibuprofen and prior to starting any new OTC products to consult with his physician or pharmacist to ensure no drug interactions are present. Mr. Penny Hall was instructed to avoid any major changes in his general diet and to avoid alcohol consumption. .      Mr. Penny Hall verbalized his understanding of all instructions and will call the office with any questions, concerns, or signs of abnormal bleeding or blood clot.

## 2022-05-25 ENCOUNTER — TELEPHONE ANTICOAG (OUTPATIENT)
Dept: CARDIOLOGY CLINIC | Age: 77
End: 2022-05-25

## 2022-05-25 DIAGNOSIS — I48.91 ATRIAL FIBRILLATION, UNSPECIFIED TYPE (HCC): Primary | ICD-10-CM

## 2022-05-25 LAB — INR, EXTERNAL: 2.5

## 2022-05-25 NOTE — PATIENT INSTRUCTIONS
Mr. Stephan Coon is here today for anticoagulation monitoring for the diagnosis of Atrial Fibrillation. His INR goal is 2.0-3.0 and his current Coumadin dose is alternating alternating 10 mg and 7.5 mg every 2 days. Today's findings include an INR of 2.5    Considering Mr. Daigle's past history, todays findings, and per the coumadin policy/protocol, Mr. Ita Hong was instructed to take Coumadin as follows,  Resume current dose of couamdin. He was also instructed to come back in 1 weeks for an INR check. A full discussion of the nature of anticoagulants has been carried out. A full discussion of the need for frequent and regular monitoring, precise dosage adjustment and compliance was stressed. Side effects of potential bleeding were discussed and Mr. Ita Hong was instructed to call [phone] if there are any signs of abnormal bleeding. Mr. Ita Hogn was instructed to avoid any OTC items containing aspirin or ibuprofen and prior to starting any new OTC products to consult with his physician or pharmacist to ensure no drug interactions are present. Mr. Ita Hong was instructed to avoid any major changes in his general diet and to avoid alcohol consumption. .      Mr. Ita Hong verbalized his understanding of all instructions and will call the office with any questions, concerns, or signs of abnormal bleeding or blood clot.

## 2022-06-01 ENCOUNTER — TELEPHONE ANTICOAG (OUTPATIENT)
Dept: CARDIOLOGY CLINIC | Age: 77
End: 2022-06-01

## 2022-06-01 DIAGNOSIS — I48.91 ATRIAL FIBRILLATION, UNSPECIFIED TYPE (HCC): Primary | ICD-10-CM

## 2022-06-01 LAB — INR, EXTERNAL: 2

## 2022-06-01 NOTE — PATIENT INSTRUCTIONS
Mr. Aroldo Medina is here today for anticoagulation monitoring for the diagnosis of Atrial Fibrillation. His INR goal is 2.0-3.0 and his current Coumadin dose is alternating 10 mg and 7.5 mg every 2 days. Today's findings include an INR of 2.0    Considering Mr. Daigle's past history, todays findings, and per the coumadin policy/protocol, Mr. Hany Julian was instructed to take Coumadin as follows,  Resume current dose of coumadin. He was also instructed to come back in 1 week or June 22, 2022 for an INR check. A full discussion of the nature of anticoagulants has been carried out. A full discussion of the need for frequent and regular monitoring, precise dosage adjustment and compliance was stressed. Side effects of potential bleeding were discussed and Mr. Hany Julian was instructed to call [phone] if there are any signs of abnormal bleeding. Mr. Hany Julian was instructed to avoid any OTC items containing aspirin or ibuprofen and prior to starting any new OTC products to consult with his physician or pharmacist to ensure no drug interactions are present. Mr. Hany Julian was instructed to avoid any major changes in his general diet and to avoid alcohol consumption. .      Mr. Hany Julian verbalized his understanding of all instructions and will call the office with any questions, concerns, or signs of abnormal bleeding or blood clot.

## 2022-06-08 DIAGNOSIS — I48.92 ATRIAL FLUTTER, UNSPECIFIED TYPE (HCC): ICD-10-CM

## 2022-06-09 RX ORDER — METOPROLOL SUCCINATE 25 MG/1
25 TABLET, EXTENDED RELEASE ORAL DAILY
Qty: 180 TABLET | Refills: 2 | Status: SHIPPED | OUTPATIENT
Start: 2022-06-09 | End: 2022-07-01

## 2022-06-09 RX ORDER — METOPROLOL SUCCINATE 25 MG/1
TABLET, EXTENDED RELEASE ORAL
Qty: 180 TABLET | Refills: 0 | OUTPATIENT
Start: 2022-06-09

## 2022-06-09 RX ORDER — WARFARIN SODIUM 5 MG/1
TABLET ORAL
Qty: 120 TABLET | Refills: 0 | Status: SHIPPED | OUTPATIENT
Start: 2022-06-09 | End: 2022-07-01 | Stop reason: SDUPTHER

## 2022-06-09 NOTE — TELEPHONE ENCOUNTER
Requested Prescriptions     Pending Prescriptions Disp Refills    metoprolol succinate (TOPROL-XL) 25 mg XL tablet 180 Tablet 2     Sig: Take 1 Tablet by mouth daily.      Refused Prescriptions Disp Refills    metoprolol succinate (TOPROL-XL) 25 mg XL tablet [Pharmacy Med Name: METOPROLOL SUCC ER 25 MG TAB] 180 Tablet 0     Sig: TAKE (1) TABLET TWICE A DAY     Refused By: German Dietz     Reason for Refusal: other

## 2022-06-22 ENCOUNTER — TELEPHONE ANTICOAG (OUTPATIENT)
Dept: CARDIOLOGY CLINIC | Age: 77
End: 2022-06-22

## 2022-06-22 DIAGNOSIS — I48.91 ATRIAL FIBRILLATION, UNSPECIFIED TYPE (HCC): Primary | ICD-10-CM

## 2022-06-22 LAB — INR, EXTERNAL: 2.5

## 2022-06-22 NOTE — PATIENT INSTRUCTIONS
Mr. Marilu Hernandez is here today for anticoagulation monitoring for the diagnosis of Atrial Fibrillation. His INR goal is 2.0-3.0 and his current Coumadin dose is 10 mg alternating 7.5 mg every 2 days. Today's findings include an INR of 2.5    Considering Mr. Daigle's past history, todays findings, and per the coumadin policy/protocol, Mr. Seble Shipman was instructed to take Coumadin as follows,  Resume same dose. He was also instructed to come back in 1 weeks for an INR check. A full discussion of the nature of anticoagulants has been carried out. A full discussion of the need for frequent and regular monitoring, precise dosage adjustment and compliance was stressed. Side effects of potential bleeding were discussed and Mr. Seble Shipman was instructed to call [phone] if there are any signs of abnormal bleeding. Mr. Seble Shipman was instructed to avoid any OTC items containing aspirin or ibuprofen and prior to starting any new OTC products to consult with his physician or pharmacist to ensure no drug interactions are present. Mr. Seble Shipman was instructed to avoid any major changes in his general diet and to avoid alcohol consumption. .      Mr. Seble Shipman verbalized his understanding of all instructions and will call the office with any questions, concerns, or signs of abnormal bleeding or blood clot.

## 2022-06-29 ENCOUNTER — TELEPHONE ANTICOAG (OUTPATIENT)
Dept: CARDIOLOGY CLINIC | Age: 77
End: 2022-06-29

## 2022-06-29 DIAGNOSIS — I48.91 ATRIAL FIBRILLATION, UNSPECIFIED TYPE (HCC): Primary | ICD-10-CM

## 2022-06-29 LAB — INR, EXTERNAL: 2.3

## 2022-07-01 ENCOUNTER — OFFICE VISIT (OUTPATIENT)
Dept: CARDIOLOGY CLINIC | Age: 77
End: 2022-07-01
Payer: MEDICARE

## 2022-07-01 VITALS
DIASTOLIC BLOOD PRESSURE: 82 MMHG | WEIGHT: 155 LBS | SYSTOLIC BLOOD PRESSURE: 133 MMHG | HEIGHT: 69 IN | HEART RATE: 78 BPM | BODY MASS INDEX: 22.96 KG/M2 | OXYGEN SATURATION: 98 %

## 2022-07-01 DIAGNOSIS — E11.9 TYPE 2 DIABETES MELLITUS WITHOUT COMPLICATION, UNSPECIFIED WHETHER LONG TERM INSULIN USE (HCC): ICD-10-CM

## 2022-07-01 DIAGNOSIS — E03.4 HYPOTHYROIDISM DUE TO ACQUIRED ATROPHY OF THYROID: ICD-10-CM

## 2022-07-01 DIAGNOSIS — I34.0 MODERATE MITRAL REGURGITATION: ICD-10-CM

## 2022-07-01 DIAGNOSIS — Z79.01 CURRENT USE OF LONG TERM ANTICOAGULATION: ICD-10-CM

## 2022-07-01 DIAGNOSIS — I11.9 BENIGN HYPERTENSIVE HEART DISEASE WITHOUT HEART FAILURE: ICD-10-CM

## 2022-07-01 DIAGNOSIS — I48.20 CHRONIC ATRIAL FIBRILLATION (HCC): Primary | ICD-10-CM

## 2022-07-01 PROCEDURE — 2022F DILAT RTA XM EVC RTNOPTHY: CPT | Performed by: INTERNAL MEDICINE

## 2022-07-01 PROCEDURE — G8536 NO DOC ELDER MAL SCRN: HCPCS | Performed by: INTERNAL MEDICINE

## 2022-07-01 PROCEDURE — 99214 OFFICE O/P EST MOD 30 MIN: CPT | Performed by: INTERNAL MEDICINE

## 2022-07-01 PROCEDURE — 1101F PT FALLS ASSESS-DOCD LE1/YR: CPT | Performed by: INTERNAL MEDICINE

## 2022-07-01 PROCEDURE — 3017F COLORECTAL CA SCREEN DOC REV: CPT | Performed by: INTERNAL MEDICINE

## 2022-07-01 PROCEDURE — G8420 CALC BMI NORM PARAMETERS: HCPCS | Performed by: INTERNAL MEDICINE

## 2022-07-01 PROCEDURE — G8510 SCR DEP NEG, NO PLAN REQD: HCPCS | Performed by: INTERNAL MEDICINE

## 2022-07-01 PROCEDURE — 3046F HEMOGLOBIN A1C LEVEL >9.0%: CPT | Performed by: INTERNAL MEDICINE

## 2022-07-01 PROCEDURE — G8427 DOCREV CUR MEDS BY ELIG CLIN: HCPCS | Performed by: INTERNAL MEDICINE

## 2022-07-01 PROCEDURE — 1123F ACP DISCUSS/DSCN MKR DOCD: CPT | Performed by: INTERNAL MEDICINE

## 2022-07-01 RX ORDER — LANOLIN ALCOHOL/MO/W.PET/CERES
400 CREAM (GRAM) TOPICAL
COMMUNITY

## 2022-07-01 RX ORDER — CHOLECALCIFEROL (VITAMIN D3) 125 MCG
CAPSULE ORAL
COMMUNITY

## 2022-07-01 RX ORDER — METOPROLOL TARTRATE 25 MG/1
25 TABLET, FILM COATED ORAL 2 TIMES DAILY
COMMUNITY

## 2022-07-01 NOTE — PROGRESS NOTES
HISTORY OF PRESENT ILLNESS  Sophia Noriega is a 78 y.o. male. Palpitations   The history is provided by the patient. This is a chronic problem. The current episode started more than 1 week ago. The problem has not changed since onset. The problem occurs daily. Associated symptoms include shortness of breath. Pertinent negatives include no diaphoresis, no fever, no claudication, no near-syncope, no orthopnea, no PND, no syncope, no nausea, no vomiting, no leg pain, no dizziness, no weakness, no cough, no hemoptysis and no sputum production. Risk factors include dyslipidemia, hypertension and male gender. His past medical history is significant for atrial fibrillation. His past medical history does not include hyperthyroidism. Shortness of Breath  The history is provided by the patient. This is a chronic problem. The problem occurs intermittently. The current episode started more than 1 week ago. The problem has not changed since onset. Pertinent negatives include no fever, no ear pain, no neck pain, no cough, no sputum production, no hemoptysis, no wheezing, no PND, no orthopnea, no syncope, no vomiting, no rash, no leg pain, no leg swelling and no claudication. He has had prior hospitalizations. Associated medical issues do not include chronic lung disease, CAD or heart failure. Follow-up  Associated symptoms include shortness of breath. Review of Systems   Constitutional: Negative for chills, diaphoresis, fever and weight loss. HENT: Negative for ear pain and hearing loss. Eyes: Negative for blurred vision. Respiratory: Positive for shortness of breath. Negative for cough, hemoptysis, sputum production, wheezing and stridor. Cardiovascular: Positive for palpitations. Negative for orthopnea, claudication, leg swelling, syncope, PND and near-syncope. Gastrointestinal: Negative for heartburn, nausea and vomiting. Musculoskeletal: Negative for myalgias and neck pain. Skin: Negative for rash. Neurological: Negative for dizziness, tingling, tremors, focal weakness, loss of consciousness and weakness. Psychiatric/Behavioral: Negative for depression and suicidal ideas. Family History   Problem Relation Age of Onset    Hypertension Mother     Heart Attack Father     Pacemaker Father        Past Medical History:   Diagnosis Date    AF (atrial fibrillation) ([facility] [address] 9/3/09    Atrial flutter (HCC)     BPH (benign prostatic hypertrophy) 9/3/09    Carotid duplex 05    No stenosis bilaterally     Diabetes mellitus, type 2 ([facility] [address]     Echocardiogram 10/27/2011    EF 60-65%. Gr 1 DDfx. RVSP 30 mmHg. Mild LAE. Mild MR. Diastolic doming of mitral valve. Mild IVCE.     Elevated PSA     Hypercholesterolemia     dislipidemia    Hypertension     Hypothyroidism     on replacement therapy    Long term (current) use of anticoagulants 3/16/2011    Lower extremity arterial duplex 09    No evidence of arterial insufficiency bilaterally at rest    Paroxysmal atrial fibrillation (HCC)     Personal history of urinary calculi 9/3/09    Stress thallium 08    Very small scarring in basal inferior wall w/min ischemia likely artifact; EF 68%; rapid AF during exercise; EKG portion negative which was terminated prematurely because of the persistent rapid AF       Past Surgical History:   Procedure Laterality Date    HX HERNIA REPAIR      , left; , right    HX LITHOTRIPSY  10/2004    Removed kidney stones    HX OTHER SURGICAL  2015    Scar tissue removed from bowels    HX TONSILLECTOMY      age 12   [facility]      Colon surgical repair following puncture during removal of polyps       Social History     Tobacco Use    Smoking status: Former Smoker     Packs/day: 1.50     Years: 35.00     Pack years: 52.50     Quit date: 10/11/1980     Years since quittin.7    Smokeless tobacco: Never Used   Substance Use Topics    Alcohol use: No       Allergies Allergen Reactions    Iodinated Contrast Media Unknown (comments)     States no allergy to iodine    Other Medication Unknown (comments)     Anesthesia (nausea)            Visit Vitals  /82   Pulse 78   Ht 5' 9\" (1.753 m)   Wt 70.3 kg (155 lb)   SpO2 98%   BMI 22.89 kg/m²     Physical Exam  Constitutional:       General: He is not in acute distress. Appearance: He is well-developed. He is not diaphoretic. HENT:      Head: Atraumatic. Mouth/Throat:      Pharynx: No oropharyngeal exudate. Eyes:      General: No scleral icterus. Conjunctiva/sclera: Conjunctivae normal.   Neck:      Vascular: No JVD. Cardiovascular:      Rate and Rhythm: Normal rate. Rhythm irregular. Heart sounds: Murmur (3/6 holosystolic murmur best heard at mitral area with no radiation) heard. No gallop. Pulmonary:      Effort: Pulmonary effort is normal.      Breath sounds: Normal breath sounds. No stridor. No wheezing or rales. Abdominal:      Palpations: Abdomen is soft. Tenderness: There is no abdominal tenderness. There is no rebound. Musculoskeletal:         General: No tenderness. Normal range of motion. Cervical back: Neck supple. Skin:     General: Skin is warm. Neurological:      Mental Status: He is alert and oriented to person, place, and time. Motor: No abnormal muscle tone. Psychiatric:         Behavior: Behavior normal.       ekg atrial fibrillaton with controlled ventricular response. 04/09/19   ECHO ADULT COMPLETE 04/11/2019 4/11/2019    Narrative · Estimated left ventricular ejection fraction is 61 - 65%. No regional   wall motion abnormality noted. Moderate (grade 2) left ventricular   diastolic dysfunction. · Left atrial cavity size is moderately dilated. · Right ventricular cavity size is mildly dilated. · Right atrial cavity size is moderately dilated. · Aortic valve leaflet calcification present with reduced excursion. Mild   aortic valve stenosis is present. Mild aortic valve regurgitation is   present. · Mitral valve thickening. Mild to moderate mitral valve regurgitation. · Mild to moderate tricuspid valve regurgitation is present. There is no   evidence of pulmonary hypertension. · Mild pulmonic valve regurgitation is present. · Mild aortic root (3.9 cm) and ascending aorta (4.2 cm) dilatation. · Mildly elevated central venous pressure (5-10 mmHg); IVC diameter is   less than 21 mm and collapses less than 50% with respiration. Signed by: Stefani Hein MD     10/12/20   ECHO ADULT COMPLETE 10/15/2020 10/15/2020    Narrative · LV: Estimated LVEF is 55 - 60%. Normal cavity size, wall thickness and   systolic function (ejection fraction normal). Wall motion: normal. Severe   (grade 3) left ventricular diastolic dysfunction. · LA: Severely dilated left atrium. Left Atrium volume index is 62.92   mL/m2. · RA: Moderately dilated right atrium. · AV: Aortic valve leaflet calcification present. Aortic valve mean   gradient is 8.2 mmHg. Aortic valve area is 1.8 cm2. Mild aortic valve   stenosis is present. · MV: Mitral valve thickening. Mitral annular calcification. Mild to   moderate mitral valve regurgitation is present. · TV: Moderate tricuspid valve regurgitation is present. · PV: Mild pulmonic valve regurgitation is present. · AO: Mild aortic root and ascending aorta dilatation. Aortic root   diameter = 4.0 cm. Ascending aorta diameter = 4.2 cm. · PA: Pulmonary arterial systolic pressure is 27 mmHg. Signed by: Stefani Hein MD   Interpretation Summary 4/2022         Left Ventricle: Normal left ventricular systolic function with a visually estimated EF of 55 - 60%. Left ventricle size is normal. Normal wall thickness. Normal wall motion. Diastolic dysfunction present with normal LV EF.   Right Ventricle: Right ventricle is mildly dilated.   Aortic Valve: Mild regurgitation. Mild stenosis of the aortic valve.  AV mean gradient is 12 mmHg. AV area by continuity VTI is 1.6 cm2.   Mitral Valve: Moderately thickened leaflet. Mild to moderate regurgitation.   Tricuspid Valve: Moderate to severe regurgitation.   Pulmonic Valve: Mild regurgitation.   Left Atrium: Left atrium is severely dilated. LA Vol Index A/L is 52 mL/m2.   Right Atrium: Right atrium is dilated.   Pulmonary Arteries: Mild pulmonary hypertension present. The estimated PASP is 44 mmHg.   Aorta: Normal sized aortic root. Mildly dilated ascending aorta. Ao Ascending diameter is 4.1 cm.       ASSESSMENT and PLAN    ICD-10-CM ICD-9-CM    1. Chronic atrial fibrillation (HCC)  I48.20 427.31 CANCELED: DIGOXIN    Stable rate controlled continue treatment follow labs   2. Moderate mitral regurgitation  I34.0 424.0     Continue monitoring follow-up echo as needed   3. Benign hypertensive heart disease without heart failure  I11.9 402.10     Stable symptom continue treatment   4. Hypothyroidism due to acquired atrophy of thyroid  E03.4 244.8      246.8     Continue current treatment lab with PCP   5. Type 2 diabetes mellitus without complication, unspecified whether long term insulin use (HCC)  E11.9 250.00     Continue treatment follow-up lab with PCP   6. Current use of long term anticoagulation  Z79.01 V58.61     Continue for A. fib monitor     Follow-up and Dispositions    · Return in about 6 months (around 1/1/2023). Patient on lopressor and digoxin for rate control. Continue coumadin for anticoagulation- INR today 2.1. Recent digoxin level wnl. Has mild to moderate mitral regurgitation with enlarged LA chamber size and mildly dilated aorta. Will repeat echo to assess LV function/ VHD. Reports well controlled bp at home. Continue current meds  7/2022  Stable cardiac status continue current medical management.

## 2022-07-01 NOTE — PROGRESS NOTES
1. Have you been to the ER, urgent care clinic since your last visit? Hospitalized since your last visit? Yes [facility] for Raccoon bite    2. Have you seen or consulted any other health care providers outside of the [address] [place] [address] since your last visit? Include any pap smears or colon screening.       no

## 2022-07-05 LAB — DIGOXIN SERPL-MCNC: 0.7 NG/ML (ref 0.8–2)

## 2022-07-06 ENCOUNTER — TELEPHONE (OUTPATIENT)
Dept: CARDIOLOGY CLINIC | Age: 77
End: 2022-07-06

## 2022-07-06 ENCOUNTER — TELEPHONE ANTICOAG (OUTPATIENT)
Dept: CARDIOLOGY CLINIC | Age: 77
End: 2022-07-06

## 2022-07-06 DIAGNOSIS — I48.91 ATRIAL FIBRILLATION, UNSPECIFIED TYPE (HCC): Primary | ICD-10-CM

## 2022-07-06 LAB — INR, EXTERNAL: 2.4

## 2022-07-06 NOTE — TELEPHONE ENCOUNTER
Called and left message on voicemail for patient regarding lab/test per Dr. Hansel Alvarez. Lab reviewed. No significant abnormality. If any questions to call office.

## 2022-07-06 NOTE — TELEPHONE ENCOUNTER
----- Message from Gavin Hagen MD sent at 7/6/2022  7:26 AM EDT -----  Lab/test  reviewed  No significant abnormality

## 2022-07-06 NOTE — PATIENT INSTRUCTIONS
Mr. Mary Ahumada is here today for anticoagulation monitoring for the diagnosis of Atrial Fibrillation. His INR goal is 2.0-3.0 and his current Coumadin dose is alternating 10 mg and 7.5 mg every 2 days. Today's findings include an INR of 2.4    Considering Mr. Daigle's past history, todays findings, and per the coumadin policy/protocol, Mr. Aidan Tadeo was instructed to take Coumadin as follows,  Resume current dose of coumadin. He was also instructed to come back in 1 weeks for an INR check. A full discussion of the nature of anticoagulants has been carried out. A full discussion of the need for frequent and regular monitoring, precise dosage adjustment and compliance was stressed. Side effects of potential bleeding were discussed and Mr. Aidan Tadeo was instructed to call [phone] if there are any signs of abnormal bleeding. Mr. Aidan Tadeo was instructed to avoid any OTC items containing aspirin or ibuprofen and prior to starting any new OTC products to consult with his physician or pharmacist to ensure no drug interactions are present. Mr. Aidan Tadeo was instructed to avoid any major changes in his general diet and to avoid alcohol consumption. .      Mr. Aidan Tadeo verbalized his understanding of all instructions and will call the office with any questions, concerns, or signs of abnormal bleeding or blood clot.

## 2022-07-13 ENCOUNTER — TELEPHONE ANTICOAG (OUTPATIENT)
Dept: CARDIOLOGY CLINIC | Age: 77
End: 2022-07-13

## 2022-07-13 DIAGNOSIS — I48.91 ATRIAL FIBRILLATION, UNSPECIFIED TYPE (HCC): Primary | ICD-10-CM

## 2022-07-13 LAB — INR, EXTERNAL: 2.3

## 2022-07-13 NOTE — PATIENT INSTRUCTIONS
Mr. Juan Miguel Nguyen is here today for anticoagulation monitoring for the diagnosis of Atrial Fibrillation. His INR goal is 2.5-3.5 and his current Coumadin dose is alternating 10 mg and 7.5 mg every 2 days. Today's findings include an INR of 2.3     Considering Mr. Daigle's past history, todays findings, and per the coumadin policy/protocol, Mr. Karyna Frances was instructed to take Coumadin as follows,  Resume same dose. He was also instructed to come back in 1 weeks for an INR check. A full discussion of the nature of anticoagulants has been carried out. A full discussion of the need for frequent and regular monitoring, precise dosage adjustment and compliance was stressed. Side effects of potential bleeding were discussed and Mr. Karyna Frances was instructed to call [phone] if there are any signs of abnormal bleeding. Mr. Karyna Frances was instructed to avoid any OTC items containing aspirin or ibuprofen and prior to starting any new OTC products to consult with his physician or pharmacist to ensure no drug interactions are present. Mr. Karyna Frances was instructed to avoid any major changes in his general diet and to avoid alcohol consumption. .      Mr. Karyna Frances verbalized his understanding of all instructions and will call the office with any questions, concerns, or signs of abnormal bleeding or blood clot.

## 2022-07-20 ENCOUNTER — TELEPHONE ANTICOAG (OUTPATIENT)
Dept: CARDIOLOGY CLINIC | Age: 77
End: 2022-07-20

## 2022-07-20 DIAGNOSIS — I48.91 ATRIAL FIBRILLATION, UNSPECIFIED TYPE (HCC): Primary | ICD-10-CM

## 2022-07-20 LAB — INR, EXTERNAL: 2.3

## 2022-08-08 DIAGNOSIS — I48.92 ATRIAL FLUTTER, UNSPECIFIED TYPE (HCC): ICD-10-CM

## 2022-08-08 RX ORDER — WARFARIN SODIUM 5 MG/1
TABLET ORAL
Qty: 120 TABLET | Refills: 0 | Status: SHIPPED | OUTPATIENT
Start: 2022-08-08 | End: 2022-10-25

## 2022-08-09 RX ORDER — DIGOXIN 125 MCG
TABLET ORAL
Qty: 90 TABLET | Refills: 3 | Status: SHIPPED | OUTPATIENT
Start: 2022-08-09

## 2022-08-17 ENCOUNTER — TELEPHONE ANTICOAG (OUTPATIENT)
Dept: CARDIOLOGY CLINIC | Age: 77
End: 2022-08-17

## 2022-08-17 DIAGNOSIS — I48.91 ATRIAL FIBRILLATION, UNSPECIFIED TYPE (HCC): Primary | ICD-10-CM

## 2022-08-17 LAB — INR, EXTERNAL: 2.5

## 2022-08-17 NOTE — PATIENT INSTRUCTIONS
Mr. Mesha Nava is here today for anticoagulation monitoring for the diagnosis of Atrial Fibrillation. His INR goal is 2.0-3.0 and his current Coumadin dose is alternating 7.5 mg and 10 mg every 2 days. Today's findings include an INR of 2.5     Considering Mr. Daigle's past history, todays findings, and per the coumadin policy/protocol, Mr. Dennys Viveros was instructed to take Coumadin as follows,  resume current dose of coumadin. He was also instructed to come back in 1 weeks for an INR check. A full discussion of the nature of anticoagulants has been carried out. A full discussion of the need for frequent and regular monitoring, precise dosage adjustment and compliance was stressed. Side effects of potential bleeding were discussed and Mr. Dennys Viveros was instructed to call [phone] if there are any signs of abnormal bleeding. Mr. Dennys Viveros was instructed to avoid any OTC items containing aspirin or ibuprofen and prior to starting any new OTC products to consult with his physician or pharmacist to ensure no drug interactions are present. Mr. Dennys Viveros was instructed to avoid any major changes in his general diet and to avoid alcohol consumption. .      Mr. Dennys Viveros verbalized his understanding of all instructions and will call the office with any questions, concerns, or signs of abnormal bleeding or blood clot.

## 2022-08-24 ENCOUNTER — TELEPHONE ANTICOAG (OUTPATIENT)
Dept: CARDIOLOGY CLINIC | Age: 77
End: 2022-08-24

## 2022-08-24 DIAGNOSIS — I48.91 ATRIAL FIBRILLATION, UNSPECIFIED TYPE (HCC): Primary | ICD-10-CM

## 2022-08-24 LAB — INR, EXTERNAL: 2.2

## 2022-08-24 NOTE — PATIENT INSTRUCTIONS
Mr. Nathaly Mcclure is here today for anticoagulation monitoring for the diagnosis of Atrial Fibrillation. His INR goal is 2.0-3.0 and his current Coumadin dose is alternating 7.5 mg and 10 mg every 2 days. Today's findings include an INR of 2.2     Considering Mr. Daigle's past history, todays findings, and per the coumadin policy/protocol, Mr. Winston Sweet was instructed to take Coumadin as follows,  resume same dose. He was also instructed to come back in 2 weeks for an INR check. A full discussion of the nature of anticoagulants has been carried out. A full discussion of the need for frequent and regular monitoring, precise dosage adjustment and compliance was stressed. Side effects of potential bleeding were discussed and Mr. Winston Sweet was instructed to call [phone] if there are any signs of abnormal bleeding. Mr. Winston Sweet was instructed to avoid any OTC items containing aspirin or ibuprofen and prior to starting any new OTC products to consult with his physician or pharmacist to ensure no drug interactions are present. Mr. Winston Sweet was instructed to avoid any major changes in his general diet and to avoid alcohol consumption. .      Mr. Winston Sweet verbalized his understanding of all instructions and will call the office with any questions, concerns, or signs of abnormal bleeding or blood clot.

## 2022-09-07 ENCOUNTER — TELEPHONE ANTICOAG (OUTPATIENT)
Dept: CARDIOLOGY CLINIC | Age: 77
End: 2022-09-07

## 2022-09-07 DIAGNOSIS — I48.91 ATRIAL FIBRILLATION, UNSPECIFIED TYPE (HCC): Primary | ICD-10-CM

## 2022-09-07 LAB — INR, EXTERNAL: 2.4

## 2022-09-07 NOTE — PATIENT INSTRUCTIONS
Mr. Joe Rodriguez is here today for anticoagulation monitoring for the diagnosis of Atrial Fibrillation. His INR goal is 2.0-3.0 and his current Coumadin dose is Alternating 10 mg and 7.5 mg every 2 days. Today's findings include an INR of 2.4     Considering Mr. Daigle's past history, todays findings, and per the coumadin policy/protocol, Mr. Pate was instructed to take Coumadin as follows,  resume same dose of coumadin. He was also instructed to come back in 1 weeks for an INR check. A full discussion of the nature of anticoagulants has been carried out. A full discussion of the need for frequent and regular monitoring, precise dosage adjustment and compliance was stressed. Side effects of potential bleeding were discussed and Mr. Pate was instructed to call [phone] if there are any signs of abnormal bleeding. Mr. Pate was instructed to avoid any OTC items containing aspirin or ibuprofen and prior to starting any new OTC products to consult with his physician or pharmacist to ensure no drug interactions are present. Mr. Pate was instructed to avoid any major changes in his general diet and to avoid alcohol consumption. .      Mr. Pate verbalized his understanding of all instructions and will call the office with any questions, concerns, or signs of abnormal bleeding or blood clot.

## 2022-09-14 ENCOUNTER — TELEPHONE ANTICOAG (OUTPATIENT)
Dept: CARDIOLOGY CLINIC | Age: 77
End: 2022-09-14

## 2022-09-14 DIAGNOSIS — I48.91 ATRIAL FIBRILLATION, UNSPECIFIED TYPE (HCC): Primary | ICD-10-CM

## 2022-09-14 LAB — INR, EXTERNAL: 2.1

## 2022-09-14 NOTE — PATIENT INSTRUCTIONS
Mr. Mesha Nava is here today for anticoagulation monitoring for the diagnosis of Atrial Fibrillation. His INR goal is 2.0-3.0 and his current Coumadin dose is alternating 7.5 mg and 10 mg every 2 days. Today's findings include an INR of 2.1     Considering Mr. Daigle's past history, todays findings, and per the coumadin policy/protocol, Mr. Dennys Viveros was instructed to take Coumadin as follows,  resume same dose of couamdin. He was also instructed to come back in 1 weeks for an INR check. A full discussion of the nature of anticoagulants has been carried out. A full discussion of the need for frequent and regular monitoring, precise dosage adjustment and compliance was stressed. Side effects of potential bleeding were discussed and Mr. Dennys Viveros was instructed to call [phone] if there are any signs of abnormal bleeding. Mr. Dennys Viveros was instructed to avoid any OTC items containing aspirin or ibuprofen and prior to starting any new OTC products to consult with his physician or pharmacist to ensure no drug interactions are present. Mr. Dennys Viveros was instructed to avoid any major changes in his general diet and to avoid alcohol consumption. .      Mr. Dennys Viveros verbalized his understanding of all instructions and will call the office with any questions, concerns, or signs of abnormal bleeding or blood clot.

## 2022-09-21 ENCOUNTER — TELEPHONE ANTICOAG (OUTPATIENT)
Dept: CARDIOLOGY CLINIC | Age: 77
End: 2022-09-21

## 2022-09-21 DIAGNOSIS — I48.91 ATRIAL FIBRILLATION, UNSPECIFIED TYPE (HCC): Primary | ICD-10-CM

## 2022-09-21 LAB — INR, EXTERNAL: 2.3

## 2022-09-21 NOTE — PATIENT INSTRUCTIONS
Mr. Romeo Salas is here today for anticoagulation monitoring for the diagnosis of Atrial Fibrillation. His INR goal is 2.0-3.0 and his current Coumadin dose is alternating 10 mg and 7.5 mg every 2 days. Today's findings include an INR of 2.3    Considering Mr. Daigle's past history, todays findings, and per the coumadin policy/protocol, Mr. Ty Wynn was instructed to take Coumadin as follows,  resume same dose of coumadin. He was also instructed to come back in 1 weeks for an INR check. A full discussion of the nature of anticoagulants has been carried out. A full discussion of the need for frequent and regular monitoring, precise dosage adjustment and compliance was stressed. Side effects of potential bleeding were discussed and Mr. Ty Wynn was instructed to call [phone] if there are any signs of abnormal bleeding. Mr. Ty Wynn was instructed to avoid any OTC items containing aspirin or ibuprofen and prior to starting any new OTC products to consult with his physician or pharmacist to ensure no drug interactions are present. Mr. Ty Wynn was instructed to avoid any major changes in his general diet and to avoid alcohol consumption. .      Mr. Ty Wynn verbalized his understanding of all instructions and will call the office with any questions, concerns, or signs of abnormal bleeding or blood clot.

## 2022-09-28 ENCOUNTER — TELEPHONE ANTICOAG (OUTPATIENT)
Dept: CARDIOLOGY CLINIC | Age: 77
End: 2022-09-28

## 2022-09-28 DIAGNOSIS — I48.91 ATRIAL FIBRILLATION, UNSPECIFIED TYPE (HCC): Primary | ICD-10-CM

## 2022-09-28 LAB — INR, EXTERNAL: 2.1

## 2022-09-28 NOTE — PATIENT INSTRUCTIONS
Mr. Marcel Chong is here today for anticoagulation monitoring for the diagnosis of Atrial Fibrillation. His INR goal is 2.0-3.0 and his current Coumadin dose is alternating 7.5 mg and 10 mg every 2 days. Today's findings include an INR of 2.1     Considering Mr. Daigle's past history, todays findings, and per the coumadin policy/protocol, Mr. Og Sandhu was instructed to take Coumadin as follows,  resume same dose of coumadin. He was also instructed to come back in 1 weeks for an INR check. A full discussion of the nature of anticoagulants has been carried out. A full discussion of the need for frequent and regular monitoring, precise dosage adjustment and compliance was stressed. Side effects of potential bleeding were discussed and Mr. Og Sandhu was instructed to call [phone] if there are any signs of abnormal bleeding. Mr. Og Sandhu was instructed to avoid any OTC items containing aspirin or ibuprofen and prior to starting any new OTC products to consult with his physician or pharmacist to ensure no drug interactions are present. Mr. Og Sandhu was instructed to avoid any major changes in his general diet and to avoid alcohol consumption. .      Mr. Og Sandhu verbalized his understanding of all instructions and will call the office with any questions, concerns, or signs of abnormal bleeding or blood clot.

## 2022-10-05 ENCOUNTER — TELEPHONE ANTICOAG (OUTPATIENT)
Dept: CARDIOLOGY CLINIC | Age: 77
End: 2022-10-05

## 2022-10-05 DIAGNOSIS — I48.91 ATRIAL FIBRILLATION, UNSPECIFIED TYPE (HCC): Primary | ICD-10-CM

## 2022-10-05 LAB — INR, EXTERNAL: 2

## 2022-10-05 NOTE — PATIENT INSTRUCTIONS
Mr. Tamiko Ervin is here today for anticoagulation monitoring for the diagnosis of Atrial Fibrillation. His INR goal is 2.0-3.0 and his current Coumadin dose is ALTERNATING 10 AND 7.5 MG EVERY 2 DAYS. Today's findings include an INR of 2.0     Considering Mr. Daigle's past history, todays findings, and per the coumadin policy/protocol, Mr. Karthik Finch was instructed to take Coumadin as follows,  RESUME SAME DOSE OF COUMADIN. He was also instructed to come back in 1 weeks for an INR check. A full discussion of the nature of anticoagulants has been carried out. A full discussion of the need for frequent and regular monitoring, precise dosage adjustment and compliance was stressed. Side effects of potential bleeding were discussed and Mr. Karthik Finch was instructed to call [phone] if there are any signs of abnormal bleeding. Mr. Karthik Finch was instructed to avoid any OTC items containing aspirin or ibuprofen and prior to starting any new OTC products to consult with his physician or pharmacist to ensure no drug interactions are present. Mr. Karthik Finch was instructed to avoid any major changes in his general diet and to avoid alcohol consumption. .      Mr. Karthik Finch verbalized his understanding of all instructions and will call the office with any questions, concerns, or signs of abnormal bleeding or blood clot.

## 2022-10-12 ENCOUNTER — TELEPHONE ANTICOAG (OUTPATIENT)
Dept: CARDIOLOGY CLINIC | Age: 77
End: 2022-10-12

## 2022-10-12 DIAGNOSIS — I48.91 ATRIAL FIBRILLATION, UNSPECIFIED TYPE (HCC): Primary | ICD-10-CM

## 2022-10-12 LAB — INR, EXTERNAL: 2.2

## 2022-10-12 NOTE — PROGRESS NOTES
Mr. Eddie Blanco is here today for anticoagulation monitoring for the diagnosis of Atrial Fibrillation. His INR goal is 2.0-3.0 and his current Coumadin dose is 5 mg. Today's findings include an INR of 2.2     Considering Mr. Daigle's past history, todays findings, and per the coumadin policy/protocol, Mr. Ruperto Ramirez was instructed to take Coumadin as follows,  10 mg, then 7.5 mg repeating every 2 days. He was also instructed to come back in 1 weeks for an INR check. A full discussion of the nature of anticoagulants has been carried out. A full discussion of the need for frequent and regular monitoring, precise dosage adjustment and compliance was stressed. Side effects of potential bleeding were discussed and Mr. Ruperto Ramirez was instructed to call [phone] if there are any signs of abnormal bleeding. Mr. Ruperto Ramirez was instructed to avoid any OTC items containing aspirin or ibuprofen and prior to starting any new OTC products to consult with his physician or pharmacist to ensure no drug interactions are present. Mr. Ruperto Ramirez was instructed to avoid any major changes in his general diet and to avoid alcohol consumption. .      Mr. Ruperto Ramirez verbalized his understanding of all instructions and will call the office with any questions, concerns, or signs of abnormal bleeding or blood clot.

## 2022-10-19 ENCOUNTER — TELEPHONE ANTICOAG (OUTPATIENT)
Dept: CARDIOLOGY CLINIC | Age: 77
End: 2022-10-19

## 2022-10-19 DIAGNOSIS — I48.91 ATRIAL FIBRILLATION, UNSPECIFIED TYPE (HCC): Primary | ICD-10-CM

## 2022-10-19 LAB — INR, EXTERNAL: 1.9

## 2022-10-19 NOTE — PATIENT INSTRUCTIONS
Mr. Elder Gavin is here today for anticoagulation monitoring for the diagnosis of Atrial Fibrillation. His INR goal is 2.0-3.0 and his current Coumadin dose is Alternating 10 mg and 7.5 mg every 2 days. Today's findings include an INR of 1.9     Considering Mr. Daigle's past history, todays findings, and per the coumadin policy/protocol, Mr. Dejah Merino was instructed to take Coumadin as follows,  resume current dose of coumadin. He was also instructed to come back in 1 weeks for an INR check. A full discussion of the nature of anticoagulants has been carried out. A full discussion of the need for frequent and regular monitoring, precise dosage adjustment and compliance was stressed. Side effects of potential bleeding were discussed and Mr. Dejah Merino was instructed to call [phone] if there are any signs of abnormal bleeding. Mr. Dejah Merino was instructed to avoid any OTC items containing aspirin or ibuprofen and prior to starting any new OTC products to consult with his physician or pharmacist to ensure no drug interactions are present. Mr. Dejah Merino was instructed to avoid any major changes in his general diet and to avoid alcohol consumption. .      Mr. Dejah Merino verbalized his understanding of all instructions and will call the office with any questions, concerns, or signs of abnormal bleeding or blood clot.

## 2022-10-25 RX ORDER — WARFARIN SODIUM 5 MG/1
TABLET ORAL
Qty: 120 TABLET | Refills: 0 | Status: SHIPPED | OUTPATIENT
Start: 2022-10-25

## 2022-10-26 ENCOUNTER — TELEPHONE ANTICOAG (OUTPATIENT)
Dept: CARDIOLOGY CLINIC | Age: 77
End: 2022-10-26

## 2022-10-26 DIAGNOSIS — I48.91 ATRIAL FIBRILLATION, UNSPECIFIED TYPE (HCC): Primary | ICD-10-CM

## 2022-10-26 LAB — INR, EXTERNAL: 1.9

## 2022-10-27 NOTE — PATIENT INSTRUCTIONS
Mr. Francis Soria is here today for anticoagulation monitoring for the diagnosis of Atrial Fibrillation. His INR goal is 2.0-3.0 and his current Coumadin dose is alternating 10 mg and 7.5 mg every 2 days. Today's findings include an INR of 1.9     Considering Mr. Daigle's past history, todays findings, and per the coumadin policy/protocol, Mr. Lauren Castillo was instructed to take Coumadin as follows,  resume same dose of coumadin. He was also instructed to come back in 1 weeks for an INR check. A full discussion of the nature of anticoagulants has been carried out. A full discussion of the need for frequent and regular monitoring, precise dosage adjustment and compliance was stressed. Side effects of potential bleeding were discussed and Mr. Lauren Castillo was instructed to call [phone] if there are any signs of abnormal bleeding. Mr. Lauren Castillo was instructed to avoid any OTC items containing aspirin or ibuprofen and prior to starting any new OTC products to consult with his physician or pharmacist to ensure no drug interactions are present. Mr. Lauren Castillo was instructed to avoid any major changes in his general diet and to avoid alcohol consumption. .      Mr. Lauren Castillo verbalized his understanding of all instructions and will call the office with any questions, concerns, or signs of abnormal bleeding or blood clot.

## 2022-11-02 ENCOUNTER — TELEPHONE ANTICOAG (OUTPATIENT)
Dept: CARDIOLOGY CLINIC | Age: 77
End: 2022-11-02

## 2022-11-02 DIAGNOSIS — I48.91 ATRIAL FIBRILLATION, UNSPECIFIED TYPE (HCC): Primary | ICD-10-CM

## 2022-11-02 LAB — INR, EXTERNAL: 2

## 2022-11-02 NOTE — PROGRESS NOTES
Mr. Bill Sarkar is here today for anticoagulation monitoring for the diagnosis of Atrial Fibrillation. His INR goal is 2.0-3.0 and his current Coumadin dose is 10 alt 7.5. Today's findings include an INR of 2.0     Considering Mr. Daigle's past history, todays findings, and per the coumadin policy/protocol, Mr. Cony Davison was instructed to take Coumadin as follows,  same dose. He was also instructed to come back in 1 weeks for an INR check. A full discussion of the nature of anticoagulants has been carried out. A full discussion of the need for frequent and regular monitoring, precise dosage adjustment and compliance was stressed. Side effects of potential bleeding were discussed and Mr. Cony Davison was instructed to call [phone] if there are any signs of abnormal bleeding. Mr. Cony Davison was instructed to avoid any OTC items containing aspirin or ibuprofen and prior to starting any new OTC products to consult with his physician or pharmacist to ensure no drug interactions are present. Mr. Cony Davison was instructed to avoid any major changes in his general diet and to avoid alcohol consumption. .      Mr. Cony Davison was left detailed message of all instructions and will call the office with any questions, concerns, or signs of abnormal bleeding or blood clot.

## 2022-11-09 ENCOUNTER — TELEPHONE ANTICOAG (OUTPATIENT)
Dept: CARDIOLOGY CLINIC | Age: 77
End: 2022-11-09

## 2022-11-09 DIAGNOSIS — I48.91 ATRIAL FIBRILLATION, UNSPECIFIED TYPE (HCC): Primary | ICD-10-CM

## 2022-11-09 LAB — INR, EXTERNAL: 2.2

## 2022-11-09 NOTE — PROGRESS NOTES
Mr. Ed Parra is here today for anticoagulation monitoring for the diagnosis of Atrial Fibrillation. His INR goal is 2.0-3.0 and his current Coumadin dose is 5 mg. Today's findings include an INR of 2.2     Considering Mr. Daigle's past history, todays findings, and per the coumadin policy/protocol, Mr. Jenniffer Turcios was instructed to take Coumadin as follows,  10 mg, then 7.5 mg repeating every 2 days. He was also instructed to come back in 1 weeks for an INR check. A full discussion of the nature of anticoagulants has been carried out. A full discussion of the need for frequent and regular monitoring, precise dosage adjustment and compliance was stressed. Side effects of potential bleeding were discussed and Mr. Jenniffer Turcios was instructed to call [phone] if there are any signs of abnormal bleeding. Mr. Jenniffer Turcios was instructed to avoid any OTC items containing aspirin or ibuprofen and prior to starting any new OTC products to consult with his physician or pharmacist to ensure no drug interactions are present. Mr. Jenniffer Turcios was instructed to avoid any major changes in his general diet and to avoid alcohol consumption. .      Mr. Jenniffer Turcios verbalized his understanding of all instructions and will call the office with any questions, concerns, or signs of abnormal bleeding or blood clot.

## 2022-11-16 ENCOUNTER — TELEPHONE ANTICOAG (OUTPATIENT)
Dept: CARDIOLOGY CLINIC | Age: 77
End: 2022-11-16

## 2022-11-16 DIAGNOSIS — I48.91 ATRIAL FIBRILLATION, UNSPECIFIED TYPE (HCC): Primary | ICD-10-CM

## 2022-11-16 LAB — INR, EXTERNAL: 2

## 2022-11-16 NOTE — PATIENT INSTRUCTIONS
Mr. Francis Soria is here today for anticoagulation monitoring for the diagnosis of Atrial Fibrillation. His INR goal is 2.0-3.0 and his current Coumadin dose is 10 mg alt 7.5 mg daily. Today's findings include an INR of 2.0       Considering Mr. Daigle's past history, todays findings, and per the coumadin policy/protocol, Mr. Lauren Castillo was instructed to take Coumadin as follows,  continue taking 10 mg alt 7.5 mg daily. He was also instructed to come back in 1 weeks for an INR check. A full discussion of the nature of anticoagulants has been carried out. A full discussion of the need for frequent and regular monitoring, precise dosage adjustment and compliance was stressed. Side effects of potential bleeding were discussed and Mr. Lauren Castillo was instructed to call [phone] if there are any signs of abnormal bleeding. Mr. Lauren Castillo was instructed to avoid any OTC items containing aspirin or ibuprofen and prior to starting any new OTC products to consult with his physician or pharmacist to ensure no drug interactions are present. Mr. Lauren Castillo was instructed to avoid any major changes in his general diet and to avoid alcohol consumption. .      Mr. Lauren Castillo verbalized his understanding of all instructions and will call the office with any questions, concerns, or signs of abnormal bleeding or blood clot.

## 2022-11-23 ENCOUNTER — TELEPHONE ANTICOAG (OUTPATIENT)
Dept: CARDIOLOGY CLINIC | Age: 77
End: 2022-11-23

## 2022-11-23 DIAGNOSIS — I48.91 ATRIAL FIBRILLATION, UNSPECIFIED TYPE (HCC): Primary | ICD-10-CM

## 2022-11-23 LAB — INR, EXTERNAL: 2.1

## 2022-11-23 NOTE — PATIENT INSTRUCTIONS
Mr. Torri Greenbegr is here today for anticoagulation monitoring for the diagnosis of Atrial Fibrillation. His INR goal is 2.0-3.0 and his current Coumadin dose is Alternating 7.5 mg and 10 mg every 2 days. Today's findings include an INR of 2.1     Considering Mr. Daigle's past history, todays findings, and per the coumadin policy/protocol, Mr. Arely Tobar was instructed to take Coumadin as follows,  resume same dose of coumadin. He was also instructed to come back in 1 weeks for an INR check. A full discussion of the nature of anticoagulants has been carried out. A full discussion of the need for frequent and regular monitoring, precise dosage adjustment and compliance was stressed. Side effects of potential bleeding were discussed and Mr. Arely Tobar was instructed to call [phone] if there are any signs of abnormal bleeding. Mr. Arely Tobar was instructed to avoid any OTC items containing aspirin or ibuprofen and prior to starting any new OTC products to consult with his physician or pharmacist to ensure no drug interactions are present. Mr. Arely Tobar was instructed to avoid any major changes in his general diet and to avoid alcohol consumption. .      Mr. Arely Tobar verbalized his understanding of all instructions and will call the office with any questions, concerns, or signs of abnormal bleeding or blood clot.

## 2022-11-30 ENCOUNTER — TELEPHONE ANTICOAG (OUTPATIENT)
Dept: CARDIOLOGY CLINIC | Age: 77
End: 2022-11-30

## 2022-11-30 DIAGNOSIS — I48.91 ATRIAL FIBRILLATION, UNSPECIFIED TYPE (HCC): Primary | ICD-10-CM

## 2022-11-30 LAB — INR, EXTERNAL: 1.8

## 2022-11-30 NOTE — PROGRESS NOTES
Mr. Greta Farah is here today for anticoagulation monitoring for the diagnosis of Atrial Fibrillation. His INR goal is 2.0-3.0 and his current Coumadin dose is 7.5 mg alt 10 mg. Today's findings include an INR of 1.8     Considering Mr. Daigle's past history, todays findings, and per the coumadin policy/protocol, Mr. Rehan Saldaña was instructed to take Coumadin as follows,  10 mg every day . He was also instructed to come back in 1 weeks for an INR check. A full discussion of the nature of anticoagulants has been carried out. A full discussion of the need for frequent and regular monitoring, precise dosage adjustment and compliance was stressed. Side effects of potential bleeding were discussed and Mr. Rehan Saldaña was instructed to call [phone] if there are any signs of abnormal bleeding. Mr. Rehan Saldaña was instructed to avoid any OTC items containing aspirin or ibuprofen and prior to starting any new OTC products to consult with his physician or pharmacist to ensure no drug interactions are present. Mr. Rehan Saldaña was instructed to avoid any major changes in his general diet and to avoid alcohol consumption. .      Mr. Rehan Saldaña was left detailed message of all instructions and will call the office with any questions, concerns, or signs of abnormal bleeding or blood clot.

## 2022-12-07 ENCOUNTER — TELEPHONE ANTICOAG (OUTPATIENT)
Dept: CARDIOLOGY CLINIC | Age: 77
End: 2022-12-07

## 2022-12-07 DIAGNOSIS — I48.91 ATRIAL FIBRILLATION, UNSPECIFIED TYPE (HCC): Primary | ICD-10-CM

## 2022-12-07 LAB — INR, EXTERNAL: 2.1

## 2022-12-07 NOTE — PROGRESS NOTES
Mr. Bill Sarkar is here today for anticoagulation monitoring for the diagnosis of Atrial Fibrillation. His INR goal is 2.0-3.0 and his current Coumadin dose is 10 mg every day. Today's findings include an INR of 2.1     Considering Mr. Daigle's past history, todays findings, and per the coumadin policy/protocol, Mr. Cony Davison was instructed to take Coumadin as follows,  same dose. He was also instructed to come back in 1 weeks for an INR check. A full discussion of the nature of anticoagulants has been carried out. A full discussion of the need for frequent and regular monitoring, precise dosage adjustment and compliance was stressed. Side effects of potential bleeding were discussed and Mr. Cony Davison was instructed to call [phone] if there are any signs of abnormal bleeding. Mr. Cony Davison was instructed to avoid any OTC items containing aspirin or ibuprofen and prior to starting any new OTC products to consult with his physician or pharmacist to ensure no drug interactions are present. Mr. Cony Davison was instructed to avoid any major changes in his general diet and to avoid alcohol consumption. .      Mr. Cony Davison verbalized his understanding of all instructions and will call the office with any questions, concerns, or signs of abnormal bleeding or blood clot.

## 2022-12-14 ENCOUNTER — TELEPHONE ANTICOAG (OUTPATIENT)
Dept: CARDIOLOGY CLINIC | Age: 77
End: 2022-12-14

## 2022-12-14 DIAGNOSIS — I48.91 ATRIAL FIBRILLATION, UNSPECIFIED TYPE (HCC): Primary | ICD-10-CM

## 2022-12-14 LAB — INR, EXTERNAL: 2.5

## 2022-12-14 NOTE — PATIENT INSTRUCTIONS
Mr. Torrey Blanca is here today for anticoagulation monitoring for the diagnosis of Atrial Fibrillation. His INR goal is 2.0-3.0 and his current Coumadin dose is 10 mg daily. Today's findings include an INR of 2.5. Considering Mr. Daigle's past history, todays findings, and per the coumadin policy/protocol, Mr. Gopal Lizarraga was instructed to take Coumadin as follows,  continue taking 10 mg and alternating 7.5 mg every 2 days. He was also instructed to come back in 1 weeks for an INR check. A full discussion of the nature of anticoagulants has been carried out. A full discussion of the need for frequent and regular monitoring, precise dosage adjustment and compliance was stressed. Side effects of potential bleeding were discussed and Mr. Gopal Lizarraga was instructed to call [phone] if there are any signs of abnormal bleeding. Mr. Gopal Lizarraga was instructed to avoid any OTC items containing aspirin or ibuprofen and prior to starting any new OTC products to consult with his physician or pharmacist to ensure no drug interactions are present. Mr. Gopal Lizarraga was instructed to avoid any major changes in his general diet and to avoid alcohol consumption. .      Mr. Gopal Lizarraga verbalized his understanding of all instructions and will call the office with any questions, concerns, or signs of abnormal bleeding or blood clot.

## 2022-12-21 ENCOUNTER — TELEPHONE ANTICOAG (OUTPATIENT)
Dept: CARDIOLOGY CLINIC | Age: 77
End: 2022-12-21

## 2022-12-21 DIAGNOSIS — I48.91 ATRIAL FIBRILLATION, UNSPECIFIED TYPE (HCC): Primary | ICD-10-CM

## 2022-12-21 LAB — INR, EXTERNAL: 2.4

## 2022-12-21 NOTE — PATIENT INSTRUCTIONS
Mr. Leandro Calle is here today for anticoagulation monitoring for the diagnosis of Atrial Fibrillation. His INR goal is 2.0-3.0 and his current Coumadin dose is 7.5 mg and 10 mg alternating every 2 days. Today's findings include an INR of 2.4     Considering Mr. Daigle's past history, todays findings, and per the coumadin policy/protocol, Mr. Dakota Wong was instructed to take Coumadin as follows,  resume same dose of coumadin. He was also instructed to come back in 1 weeks for an INR check. A full discussion of the nature of anticoagulants has been carried out. A full discussion of the need for frequent and regular monitoring, precise dosage adjustment and compliance was stressed. Side effects of potential bleeding were discussed and Mr. Dakota Wong was instructed to call [phone] if there are any signs of abnormal bleeding. Mr. Dakota Wong was instructed to avoid any OTC items containing aspirin or ibuprofen and prior to starting any new OTC products to consult with his physician or pharmacist to ensure no drug interactions are present. Mr. Dakota Wong was instructed to avoid any major changes in his general diet and to avoid alcohol consumption. .      Mr. Dakota Wong verbalized his understanding of all instructions and will call the office with any questions, concerns, or signs of abnormal bleeding or blood clot.

## 2022-12-28 ENCOUNTER — TELEPHONE ANTICOAG (OUTPATIENT)
Dept: CARDIOLOGY CLINIC | Age: 77
End: 2022-12-28

## 2022-12-28 DIAGNOSIS — I48.91 ATRIAL FIBRILLATION, UNSPECIFIED TYPE (HCC): Primary | ICD-10-CM

## 2022-12-28 LAB — INR, EXTERNAL: 2.9

## 2022-12-28 NOTE — PATIENT INSTRUCTIONS
Mr. Greta Farah is here today for anticoagulation monitoring for the diagnosis of Atrial Fibrillation. His INR goal is 2.0-3.0 and his current Coumadin dose is Alternating 7.5 mg and 10 mg every 2 days. Today's findings include an INR of 2.9     Considering Mr. Daigle's past history, todays findings, and per the coumadin policy/protocol, Mr. Lulu Adler was instructed to take Coumadin as follows,  resume same dose of coumadin. He was also instructed to come back in 1 weeks for an INR check. A full discussion of the nature of anticoagulants has been carried out. A full discussion of the need for frequent and regular monitoring, precise dosage adjustment and compliance was stressed. Side effects of potential bleeding were discussed and Mr. Lulu Adler was instructed to call [phone] if there are any signs of abnormal bleeding. Mr. Lulu Adler was instructed to avoid any OTC items containing aspirin or ibuprofen and prior to starting any new OTC products to consult with his physician or pharmacist to ensure no drug interactions are present. Mr. Lulu Adler was instructed to avoid any major changes in his general diet and to avoid alcohol consumption. .      Mr. Lulu Adler verbalized his understanding of all instructions and will call the office with any questions, concerns, or signs of abnormal bleeding or blood clot.

## 2022-12-29 RX ORDER — WARFARIN SODIUM 5 MG/1
TABLET ORAL
Qty: 120 TABLET | Refills: 0 | Status: SHIPPED | OUTPATIENT
Start: 2022-12-29

## 2023-01-04 ENCOUNTER — TELEPHONE ANTICOAG (OUTPATIENT)
Dept: CARDIOLOGY CLINIC | Age: 78
End: 2023-01-04

## 2023-01-04 DIAGNOSIS — I48.91 ATRIAL FIBRILLATION, UNSPECIFIED TYPE (HCC): Primary | ICD-10-CM

## 2023-01-04 LAB — INR, EXTERNAL: 2.2

## 2023-01-04 NOTE — PATIENT INSTRUCTIONS
Mr. Parisa Melchor is here today for anticoagulation monitoring for the diagnosis of Atrial Fibrillation. His INR goal is 2.0-3.0 and his current Coumadin dose is alternating 7.5 mg and 10 mg every 2 days. Today's findings include an INR of 2.2     Considering Mr. Daigle's past history, todays findings, and per the coumadin policy/protocol, Mr. Verenice Wick was instructed to take Coumadin as follows,  resume same dose of couamdin. He was also instructed to come back in 1 weeks for an INR check. A full discussion of the nature of anticoagulants has been carried out. A full discussion of the need for frequent and regular monitoring, precise dosage adjustment and compliance was stressed. Side effects of potential bleeding were discussed and Mr. Verenice Wick was instructed to call [phone] if there are any signs of abnormal bleeding. Mr. Verenice Wick was instructed to avoid any OTC items containing aspirin or ibuprofen and prior to starting any new OTC products to consult with his physician or pharmacist to ensure no drug interactions are present. Mr. Verenice Wick was instructed to avoid any major changes in his general diet and to avoid alcohol consumption. .      Mr. Verenice Wick verbalized his understanding of all instructions and will call the office with any questions, concerns, or signs of abnormal bleeding or blood clot.

## 2023-01-09 ENCOUNTER — OFFICE VISIT (OUTPATIENT)
Dept: CARDIOLOGY CLINIC | Age: 78
End: 2023-01-09
Payer: MEDICARE

## 2023-01-09 VITALS
BODY MASS INDEX: 22.66 KG/M2 | HEIGHT: 69 IN | OXYGEN SATURATION: 99 % | DIASTOLIC BLOOD PRESSURE: 75 MMHG | SYSTOLIC BLOOD PRESSURE: 135 MMHG | WEIGHT: 153 LBS | HEART RATE: 87 BPM

## 2023-01-09 DIAGNOSIS — I48.92 ATRIAL FLUTTER, UNSPECIFIED TYPE (HCC): Primary | ICD-10-CM

## 2023-01-09 DIAGNOSIS — I34.0 MODERATE MITRAL REGURGITATION: ICD-10-CM

## 2023-01-09 DIAGNOSIS — I36.1 NONRHEUMATIC TRICUSPID VALVE REGURGITATION: ICD-10-CM

## 2023-01-09 DIAGNOSIS — I11.9 BENIGN HYPERTENSIVE HEART DISEASE WITHOUT HEART FAILURE: ICD-10-CM

## 2023-01-09 DIAGNOSIS — I34.0 NONRHEUMATIC MITRAL VALVE REGURGITATION: ICD-10-CM

## 2023-01-09 DIAGNOSIS — I35.0 NONRHEUMATIC AORTIC VALVE STENOSIS: ICD-10-CM

## 2023-01-09 DIAGNOSIS — Z79.01 CURRENT USE OF LONG TERM ANTICOAGULATION: ICD-10-CM

## 2023-01-09 PROCEDURE — 1101F PT FALLS ASSESS-DOCD LE1/YR: CPT | Performed by: INTERNAL MEDICINE

## 2023-01-09 PROCEDURE — G8427 DOCREV CUR MEDS BY ELIG CLIN: HCPCS | Performed by: INTERNAL MEDICINE

## 2023-01-09 PROCEDURE — 3017F COLORECTAL CA SCREEN DOC REV: CPT | Performed by: INTERNAL MEDICINE

## 2023-01-09 PROCEDURE — G8420 CALC BMI NORM PARAMETERS: HCPCS | Performed by: INTERNAL MEDICINE

## 2023-01-09 PROCEDURE — 1123F ACP DISCUSS/DSCN MKR DOCD: CPT | Performed by: INTERNAL MEDICINE

## 2023-01-09 PROCEDURE — G8432 DEP SCR NOT DOC, RNG: HCPCS | Performed by: INTERNAL MEDICINE

## 2023-01-09 PROCEDURE — G8536 NO DOC ELDER MAL SCRN: HCPCS | Performed by: INTERNAL MEDICINE

## 2023-01-09 PROCEDURE — 99214 OFFICE O/P EST MOD 30 MIN: CPT | Performed by: INTERNAL MEDICINE

## 2023-01-09 NOTE — PROGRESS NOTES
1. Have you been to the ER, urgent care clinic since your last visit? Hospitalized since your last visit? No    2. Have you seen or consulted any other health care providers outside of the [address] [place] [address] since your last visit? Include any pap smears or colon screening.  No

## 2023-01-09 NOTE — PROGRESS NOTES
HISTORY OF PRESENT ILLNESS  Devon Senior is a 78 y.o. male. Follow-up  Associated symptoms include shortness of breath. Palpitations   The history is provided by the Patient. This is a chronic problem. The current episode started more than 1 week ago. The problem has not changed since onset. The problem occurs daily. Associated symptoms include shortness of breath. Pertinent negatives include no diaphoresis, no fever, no claudication, no near-syncope, no orthopnea, no PND, no syncope, no nausea, no vomiting, no leg pain, no dizziness, no weakness, no cough, no hemoptysis and no sputum production. Risk factors include dyslipidemia, hypertension and male gender. His past medical history is significant for atrial fibrillation. His past medical history does not include hyperthyroidism. Shortness of Breath  The history is provided by the Patient. This is a chronic problem. The problem occurs intermittently. The current episode started more than 1 week ago. The problem has not changed since onset. Pertinent negatives include no fever, no ear pain, no neck pain, no cough, no sputum production, no hemoptysis, no wheezing, no PND, no orthopnea, no syncope, no vomiting, no rash, no leg pain, no leg swelling and no claudication. He has had Prior hospitalizations. Associated medical issues do not include chronic lung disease, CAD or heart failure. Review of Systems   Constitutional:  Negative for chills, diaphoresis, fever and weight loss. HENT:  Negative for ear pain and hearing loss. Eyes:  Negative for blurred vision. Respiratory:  Positive for shortness of breath. Negative for cough, hemoptysis, sputum production, wheezing and stridor. Cardiovascular:  Positive for palpitations. Negative for orthopnea, claudication, leg swelling, syncope, PND and near-syncope. Gastrointestinal:  Negative for heartburn, nausea and vomiting. Musculoskeletal:  Negative for myalgias and neck pain.    Skin:  Negative for rash.   Neurological:  Negative for dizziness, tingling, tremors, focal weakness, loss of consciousness and weakness. Psychiatric/Behavioral:  Negative for depression and suicidal ideas. Family History   Problem Relation Age of Onset    Hypertension Mother     Heart Attack Father     Pacemaker Father        Past Medical History:   Diagnosis Date    AF (atrial fibrillation) ([address] 9/3/09    Atrial flutter (HCC)     BPH (benign prostatic hypertrophy) 9/3/09    Carotid duplex 05    No stenosis bilaterally     Diabetes mellitus, type 2 (HCC)     Echocardiogram 10/27/2011    EF 60-65%. Gr 1 DDfx. RVSP 30 mmHg. Mild LAE. Mild MR. Diastolic doming of mitral valve. Mild IVCE. Elevated PSA     Hypercholesterolemia     dislipidemia    Hypertension     Hypothyroidism     on replacement therapy    Long term (current) use of anticoagulants 3/16/2011    Lower extremity arterial duplex 09    No evidence of arterial insufficiency bilaterally at rest    Paroxysmal atrial fibrillation (HCC)     Personal history of urinary calculi 9/3/09    Stress thallium 08    Very small scarring in basal inferior wall w/min ischemia likely artifact; EF 68%; rapid AF during exercise; EKG portion negative which was terminated prematurely because of the persistent rapid AF       Past Surgical History:   Procedure Laterality Date    HX HERNIA REPAIR      , left; , right    HX LITHOTRIPSY  10/2004    Removed kidney stones    HX OTHER SURGICAL  2015    Scar tissue removed from bowels    HX TONSILLECTOMY      age 12    ND [address] 61    Colon surgical repair following puncture during removal of polyps       Social History     Tobacco Use    Smoking status: Former     Packs/day: 1.50     Years: 35.00     Pack years: 52.50     Types: Cigarettes     Quit date: 10/11/1980     Years since quittin.2    Smokeless tobacco: Never   Substance Use Topics    Alcohol use:  No Allergies   Allergen Reactions    Iodinated Contrast Media Unknown (comments)     States no allergy to iodine    Other Medication Unknown (comments)     Anesthesia (nausea)            Visit Vitals  /75 (BP 1 Location: Left upper arm, BP Patient Position: Sitting, BP Cuff Size: Adult)   Pulse 87   Ht 5' 9\" (1.753 m)   Wt 69.4 kg (153 lb)   SpO2 99%   BMI 22.59 kg/m²     Physical Exam  Constitutional:       General: He is not in acute distress. Appearance: He is well-developed. He is not diaphoretic. HENT:      Head: Atraumatic. Mouth/Throat:      Pharynx: No oropharyngeal exudate. Eyes:      General: No scleral icterus. Conjunctiva/sclera: Conjunctivae normal.   Neck:      Vascular: No JVD. Cardiovascular:      Rate and Rhythm: Normal rate. Rhythm irregular. Heart sounds: Murmur (3/6 holosystolic murmur best heard at mitral area with no radiation) heard. Crescendo decrescendo systolic murmur is present with a grade of 2/6. No gallop. Pulmonary:      Effort: Pulmonary effort is normal.      Breath sounds: Normal breath sounds. No stridor. No wheezing or rales. Abdominal:      Palpations: Abdomen is soft. Tenderness: There is no abdominal tenderness. There is no rebound. Musculoskeletal:         General: No tenderness. Normal range of motion. Cervical back: Neck supple. Skin:     General: Skin is warm. Neurological:      Mental Status: He is alert and oriented to person, place, and time. Motor: No abnormal muscle tone. Psychiatric:         Behavior: Behavior normal.     ekg atrial fibrillaton with controlled ventricular response. 04/09/19   ECHO ADULT COMPLETE 04/11/2019 4/11/2019    Narrative · Estimated left ventricular ejection fraction is 61 - 65%. No regional   wall motion abnormality noted. Moderate (grade 2) left ventricular   diastolic dysfunction. · Left atrial cavity size is moderately dilated.   · Right ventricular cavity size is mildly dilated. · Right atrial cavity size is moderately dilated. · Aortic valve leaflet calcification present with reduced excursion. Mild   aortic valve stenosis is present. Mild aortic valve regurgitation is   present. · Mitral valve thickening. Mild to moderate mitral valve regurgitation. · Mild to moderate tricuspid valve regurgitation is present. There is no   evidence of pulmonary hypertension. · Mild pulmonic valve regurgitation is present. · Mild aortic root (3.9 cm) and ascending aorta (4.2 cm) dilatation. · Mildly elevated central venous pressure (5-10 mmHg); IVC diameter is   less than 21 mm and collapses less than 50% with respiration. Signed by: Joyce Wallace MD     10/12/20   ECHO ADULT COMPLETE 10/15/2020 10/15/2020    Narrative · LV: Estimated LVEF is 55 - 60%. Normal cavity size, wall thickness and   systolic function (ejection fraction normal). Wall motion: normal. Severe   (grade 3) left ventricular diastolic dysfunction. · LA: Severely dilated left atrium. Left Atrium volume index is 62.92   mL/m2. · RA: Moderately dilated right atrium. · AV: Aortic valve leaflet calcification present. Aortic valve mean   gradient is 8.2 mmHg. Aortic valve area is 1.8 cm2. Mild aortic valve   stenosis is present. · MV: Mitral valve thickening. Mitral annular calcification. Mild to   moderate mitral valve regurgitation is present. · TV: Moderate tricuspid valve regurgitation is present. · PV: Mild pulmonic valve regurgitation is present. · AO: Mild aortic root and ascending aorta dilatation. Aortic root   diameter = 4.0 cm. Ascending aorta diameter = 4.2 cm. · PA: Pulmonary arterial systolic pressure is 27 mmHg. Signed by: Joyce Wallace MD   Interpretation Summary 4/2022         Left Ventricle: Normal left ventricular systolic function with a visually estimated EF of 55 - 60%. Left ventricle size is normal. Normal wall thickness. Normal wall motion.  Diastolic dysfunction present with normal LV EF. Right Ventricle: Right ventricle is mildly dilated. Aortic Valve: Mild regurgitation. Mild stenosis of the aortic valve. AV mean gradient is 12 mmHg. AV area by continuity VTI is 1.6 cm2. Mitral Valve: Moderately thickened leaflet. Mild to moderate regurgitation. Tricuspid Valve: Moderate to severe regurgitation. Pulmonic Valve: Mild regurgitation. Left Atrium: Left atrium is severely dilated. LA Vol Index A/L is 52 mL/m2. Right Atrium: Right atrium is dilated. Pulmonary Arteries: Mild pulmonary hypertension present. The estimated PASP is 44 mmHg. Aorta: Normal sized aortic root. Mildly dilated ascending aorta. Ao Ascending diameter is 4.1 cm. I Have personally reviewed recent relevant labs available and discussed with patient  7/2022-digoxin level  8/2022-CMP, lipid hemoglobin A1c    ASSESSMENT and PLAN    ICD-10-CM ICD-9-CM    1. chronic atrial fibrillation  I48.92 427.32     Stable rate controlled continue treatment      2. Moderate mitral regurgitation  I34.0 424.0     Stable monitor      3. Benign hypertensive heart disease without heart failure  I11.9 402.10     Stable continue current treatment      4. Current use of long term anticoagulation  Z79.01 V58.61     Continue for A. fib. 5. Nonrheumatic aortic valve stenosis  I35.0 424.1     Mild asymptomatic monitor      6. Nonrheumatic tricuspid valve regurgitation  I36.1 424.2     Moderate to severe monitor      7. Nonrheumatic mitral valve regurgitation  I34.0 424.0     Mild to moderate monitor        Follow-up and Dispositions    Return in about 6 months (around 7/9/2023). Patient on lopressor and digoxin for rate control. Continue coumadin for anticoagulation- INR today 2.1. Recent digoxin level wnl. Has mild to moderate mitral regurgitation with enlarged LA chamber size and mildly dilated aorta. Will repeat echo to assess LV function/ VHD.   Reports well controlled bp at home.  Continue current meds  7/2022  Stable cardiac status continue current medical management. 1/2023  Cardiac status stable. Rate controlled continue current treatment. INR monitored.   Continue medical management

## 2023-01-11 ENCOUNTER — TELEPHONE ANTICOAG (OUTPATIENT)
Dept: CARDIOLOGY CLINIC | Age: 78
End: 2023-01-11

## 2023-01-11 DIAGNOSIS — I48.91 ATRIAL FIBRILLATION, UNSPECIFIED TYPE (HCC): Primary | ICD-10-CM

## 2023-01-11 LAB — INR, EXTERNAL: 2.4

## 2023-01-11 NOTE — PROGRESS NOTES
Mr. Mackenzie Cordova is here today for anticoagulation monitoring for the diagnosis of Atrial Fibrillation. His INR goal is 2.0-3.0 and his current Coumadin dose is 5 mg. Today's findings include an INR of 2.4     Considering Mr. Daigle's past history, todays findings, and per the coumadin policy/protocol, Mr. Laura Lyn was instructed to take Coumadin as follows,  10 mg, then 7.5 mg repeating every 2 days. He was also instructed to come back in 1 weeks for an INR check. A full discussion of the nature of anticoagulants has been carried out. A full discussion of the need for frequent and regular monitoring, precise dosage adjustment and compliance was stressed. Side effects of potential bleeding were discussed and Mr. Laura Lyn was instructed to call [phone] if there are any signs of abnormal bleeding. Mr. Laura Lyn was instructed to avoid any OTC items containing aspirin or ibuprofen and prior to starting any new OTC products to consult with his physician or pharmacist to ensure no drug interactions are present. Mr. Laura Lyn was instructed to avoid any major changes in his general diet and to avoid alcohol consumption. .      Mr. Laura Lyn verbalized his understanding of all instructions and will call the office with any questions, concerns, or signs of abnormal bleeding or blood clot.

## 2023-01-18 ENCOUNTER — TELEPHONE ANTICOAG (OUTPATIENT)
Dept: CARDIOLOGY CLINIC | Age: 78
End: 2023-01-18

## 2023-01-18 ENCOUNTER — TELEPHONE (OUTPATIENT)
Dept: CARDIOLOGY CLINIC | Age: 78
End: 2023-01-18

## 2023-01-18 DIAGNOSIS — I48.91 ATRIAL FIBRILLATION, UNSPECIFIED TYPE (HCC): Primary | ICD-10-CM

## 2023-01-18 LAB — INR, EXTERNAL: 2.1

## 2023-01-18 NOTE — PATIENT INSTRUCTIONS
Mr. Jordin Truong is here today for anticoagulation monitoring for the diagnosis of Atrial Fibrillation. His INR goal is 2.0-3.0 and his current Coumadin dose is alternating 7.5 mg and 10 mg every 2 days . Today's findings include an INR of 2.1     Considering Mr. Daigle's past history, todays findings, and per the coumadin policy/protocol, Mr. Kaylyn Fulton was instructed to take Coumadin as follows,  resume same dose of coumadin. He was also instructed to come back in 1 weeks for an INR check. A full discussion of the nature of anticoagulants has been carried out. A full discussion of the need for frequent and regular monitoring, precise dosage adjustment and compliance was stressed. Side effects of potential bleeding were discussed and Mr. Kaylyn Fulton was instructed to call [phone] if there are any signs of abnormal bleeding. Mr. Kaylyn Fulton was instructed to avoid any OTC items containing aspirin or ibuprofen and prior to starting any new OTC products to consult with his physician or pharmacist to ensure no drug interactions are present. Mr. Kaylyn Fulton was instructed to avoid any major changes in his general diet and to avoid alcohol consumption. .      Mr. Kaylyn Fulton verbalized his understanding of all instructions and will call the office with any questions, concerns, or signs of abnormal bleeding or blood clot.

## 2023-01-25 ENCOUNTER — TELEPHONE ANTICOAG (OUTPATIENT)
Dept: CARDIOLOGY CLINIC | Age: 78
End: 2023-01-25

## 2023-01-25 DIAGNOSIS — I48.91 ATRIAL FIBRILLATION, UNSPECIFIED TYPE (HCC): Primary | ICD-10-CM

## 2023-01-25 LAB — INR, EXTERNAL: 2.3

## 2023-01-25 NOTE — PATIENT INSTRUCTIONS
Mr. Nathaly Mcclure is here today for anticoagulation monitoring for the diagnosis of Atrial Fibrillation. His INR goal is 2.0-3.0 and his current Coumadin dose is 10 mg alt 7.5 mg. Today's findings include an INR of 2.3     Considering Mr. Daigle's past history, todays findings, and per the coumadin policy/protocol, Mr. Winston Sweet was instructed to take Coumadin as follows,  continue taking 10 mg alt 7.5 mg daily. He was also instructed to come back in 1 weeks for an INR check. A full discussion of the nature of anticoagulants has been carried out. A full discussion of the need for frequent and regular monitoring, precise dosage adjustment and compliance was stressed. Side effects of potential bleeding were discussed and Mr. Winston Sweet was instructed to call [phone] if there are any signs of abnormal bleeding. Mr. Winston Sweet was instructed to avoid any OTC items containing aspirin or ibuprofen and prior to starting any new OTC products to consult with his physician or pharmacist to ensure no drug interactions are present. Mr. Winston Sweet was instructed to avoid any major changes in his general diet and to avoid alcohol consumption. .      Mr. Winston Sweet verbalized his understanding of all instructions and will call the office with any questions, concerns, or signs of abnormal bleeding or blood clot.

## 2023-02-01 ENCOUNTER — TELEPHONE ANTICOAG (OUTPATIENT)
Dept: CARDIOLOGY CLINIC | Age: 78
End: 2023-02-01

## 2023-02-01 DIAGNOSIS — I48.91 ATRIAL FIBRILLATION, UNSPECIFIED TYPE (HCC): Primary | ICD-10-CM

## 2023-02-01 LAB — INR, EXTERNAL: 2.4

## 2023-02-01 NOTE — PATIENT INSTRUCTIONS
Mr. Braeden Ramirez is here today for anticoagulation monitoring for the diagnosis of Atrial Fibrillation. His INR goal is 2.0-3.0 and his current Coumadin dose is 10 mg alt 7.5 mg daily. Today's findings include an INR of 2.4     Considering Mr. Daigle's past history, todays findings, and per the coumadin policy/protocol, Mr. Ty Beth was instructed to take Coumadin as follows,  continue taking 10 mg alt 7.5 mg daily. He was also instructed to come back in 1 weeks for an INR check. A full discussion of the nature of anticoagulants has been carried out. A full discussion of the need for frequent and regular monitoring, precise dosage adjustment and compliance was stressed. Side effects of potential bleeding were discussed and Mr. Ty Beth was instructed to call [phone] if there are any signs of abnormal bleeding. Mr. Ty Beth was instructed to avoid any OTC items containing aspirin or ibuprofen and prior to starting any new OTC products to consult with his physician or pharmacist to ensure no drug interactions are present. Mr. Ty Beth was instructed to avoid any major changes in his general diet and to avoid alcohol consumption. .      Mr. Ty Beth verbalized his understanding of all instructions and will call the office with any questions, concerns, or signs of abnormal bleeding or blood clot.

## 2023-02-08 ENCOUNTER — TELEPHONE ANTICOAG (OUTPATIENT)
Dept: CARDIOLOGY CLINIC | Age: 78
End: 2023-02-08

## 2023-02-08 DIAGNOSIS — I48.91 ATRIAL FIBRILLATION, UNSPECIFIED TYPE (HCC): Primary | ICD-10-CM

## 2023-02-08 LAB — INR, EXTERNAL: 1.9

## 2023-02-08 NOTE — PATIENT INSTRUCTIONS
Mr. Angel Potter is here today for anticoagulation monitoring for the diagnosis of Atrial Fibrillation. His INR goal is 2.0-3.0 and his current Coumadin dose is 10 mg alt 7.5 mg daily. Today's findings include an INR of 1.9     Considering Mr. Daigle's past history, todays findings, and per the coumadin policy/protocol, Mr. Saman Solo was instructed to take Coumadin as follows,  continue taking 10 mg alt 7.5 mg daily. He was also instructed to come back in 1 weeks for an INR check. A full discussion of the nature of anticoagulants has been carried out. A full discussion of the need for frequent and regular monitoring, precise dosage adjustment and compliance was stressed. Side effects of potential bleeding were discussed and Mr. Saman Solo was instructed to call [phone] if there are any signs of abnormal bleeding. Mr. Saman Solo was instructed to avoid any OTC items containing aspirin or ibuprofen and prior to starting any new OTC products to consult with his physician or pharmacist to ensure no drug interactions are present. Mr. Saman Solo was instructed to avoid any major changes in his general diet and to avoid alcohol consumption. .      Mr. Saman Solo verbalized his understanding of all instructions and will call the office with any questions, concerns, or signs of abnormal bleeding or blood clot.

## 2023-02-15 ENCOUNTER — ANTI-COAG VISIT (OUTPATIENT)
Age: 78
End: 2023-02-15

## 2023-02-15 DIAGNOSIS — I48.91 AF (ATRIAL FIBRILLATION) (HCC): Primary | ICD-10-CM

## 2023-02-15 LAB — INR BLD: 2.8

## 2023-02-15 NOTE — PROGRESS NOTES
Mr. Ean Burris is here today for anticoagulation monitoring for the diagnosis of Atrial Fibrillation. His INR goal is 2.0-3.0 and his current Coumadin dose is 7.5 mg and 10 mg alternating every 2 days.     Today's findings include an INR of 2.8    Considering Mr. Burris's past history, todays findings, and per the coumadin policy/protocol, Mr. Burris was instructed to take Coumadin as follows, resume same dose of coumadin. He was also instructed to come back in 1 weeks for an INR check.    A full discussion of the nature of anticoagulants has been carried out. A full discussion of the need for frequent and regular monitoring, precise dosage adjustment and compliance was stressed. Side effects of potential bleeding were discussed and Mr. Burris was instructed to call [phone] if there are any signs of abnormal bleeding. Mr. Burris was instructed to avoid any OTC items containing aspirin or ibuprofen and prior to starting any new OTC products to consult with his physician or pharmacist to ensure no drug interactions are present. Mr. Burris was instructed to avoid any major changes in his general diet and to avoid alcohol consumption. .    Mr. Burris verbalized his understanding of all instructions and will call the office with any questions, concerns, or signs of abnormal bleeding or blood clot.

## 2023-02-22 ENCOUNTER — ANTI-COAG VISIT (OUTPATIENT)
Age: 78
End: 2023-02-22

## 2023-02-22 DIAGNOSIS — I48.91 AF (ATRIAL FIBRILLATION) (HCC): Primary | ICD-10-CM

## 2023-02-22 LAB — INR BLD: 2.5

## 2023-02-22 NOTE — PROGRESS NOTES
Mr. Danny Swift is here today for anticoagulation monitoring for the diagnosis of atrial fibrillation. His INR goal is 2.0-3.0 and his current Coumadin dose is Alternating 7.5 mg and 10 mg every 2 days. Today's findings include an INR of 2.5     Considering Mr. Burris's past history, todays findings, and per the coumadin policy/protocol, Mr. Anson Pisano was instructed to take Coumadin as follows,  resume same dose of couamdin. He was also instructed to come back in 1 weeks for an INR check. A full discussion of the nature of anticoagulants has been carried out. A full discussion of the need for frequent and regular monitoring, precise dosage adjustment and compliance was stressed. Side effects of potential bleeding were discussed and Mr. Anson Pisano was instructed to call [phone] if there are any signs of abnormal bleeding. Mr. Anson Pisano was instructed to avoid any OTC items containing aspirin or ibuprofen and prior to starting any new OTC products to consult with his physician or pharmacist to ensure no drug interactions are present. Mr. Anson Pisano was instructed to avoid any major changes in his general diet and to avoid alcohol consumption. .      Mr. Anson Pisano verbalized his understanding of all instructions and will call the office with any questions, concerns, or signs of abnormal bleeding or blood clot.

## 2023-03-01 ENCOUNTER — ANTI-COAG VISIT (OUTPATIENT)
Age: 78
End: 2023-03-01

## 2023-03-01 DIAGNOSIS — I48.91 AF (ATRIAL FIBRILLATION) (HCC): Primary | ICD-10-CM

## 2023-03-01 LAB — INR BLD: 2.5

## 2023-03-01 NOTE — PATIENT INSTRUCTIONS
Mr. Bennett Goyal is here today for anticoagulation monitoring for the diagnosis of atrial fibrillation. His INR goal is 2.0-3.0 and his current Coumadin dose is 7.5 mg alt 5 mg daily. Today's findings include an INR of 2.5     Considering Mr. Burris's past history, todays findings, and per the coumadin policy/protocol, Mr. Samantha Garcia was instructed to take Coumadin as follows,  continue taking 7.5 mg alt 5 mg daily. He was also instructed to come back in 1 weeks for an INR check. A full discussion of the nature of anticoagulants has been carried out. A full discussion of the need for frequent and regular monitoring, precise dosage adjustment and compliance was stressed. Side effects of potential bleeding were discussed and Mr. Samantha Garcia was instructed to call [phone] if there are any signs of abnormal bleeding. Mr. Samantha Garcia was instructed to avoid any OTC items containing aspirin or ibuprofen and prior to starting any new OTC products to consult with his physician or pharmacist to ensure no drug interactions are present. Mr. Samantha Garcia was instructed to avoid any major changes in his general diet and to avoid alcohol consumption. .      Mr. Samantha Garcia verbalized his understanding of all instructions and will call the office with any questions, concerns, or signs of abnormal bleeding or blood clot.

## 2023-03-02 RX ORDER — WARFARIN SODIUM 5 MG/1
TABLET ORAL
Qty: 120 TABLET | Refills: 0 | Status: SHIPPED | OUTPATIENT
Start: 2023-03-02

## 2023-03-02 RX ORDER — METOPROLOL SUCCINATE 25 MG/1
TABLET, EXTENDED RELEASE ORAL
Qty: 180 TABLET | Refills: 0 | Status: SHIPPED | OUTPATIENT
Start: 2023-03-02

## 2023-03-08 ENCOUNTER — ANTI-COAG VISIT (OUTPATIENT)
Age: 78
End: 2023-03-08

## 2023-03-08 DIAGNOSIS — I48.91 AF (ATRIAL FIBRILLATION) (HCC): Primary | ICD-10-CM

## 2023-03-08 LAB — INR BLD: 2.5

## 2023-03-08 NOTE — PROGRESS NOTES
Mr. Matthew Mittal is here today for anticoagulation monitoring for the diagnosis of atrial fibrillation. His INR goal is 2.0-3.0 and his current Coumadin dose is  Alternating 7.5 mg and 10 mg every 2 days. Today's findings include an INR of 2.5     Considering Mr. Burris's past history, todays findings, and per the coumadin policy/protocol, Mr. Iris Sánchez was instructed to take Coumadin as follows,  resume current dose coumadin. He was also instructed to come back in 1 weeks for an INR check. A full discussion of the nature of anticoagulants has been carried out. A full discussion of the need for frequent and regular monitoring, precise dosage adjustment and compliance was stressed. Side effects of potential bleeding were discussed and Mr. Iris Sánchez was instructed to call [phone] if there are any signs of abnormal bleeding. Mr. Iris Sánchez was instructed to avoid any OTC items containing aspirin or ibuprofen and prior to starting any new OTC products to consult with his physician or pharmacist to ensure no drug interactions are present. Mr. Iris Sánchez was instructed to avoid any major changes in his general diet and to avoid alcohol consumption. .      Mr. Iris Sánchez verbalized his understanding of all instructions and will call the office with any questions, concerns, or signs of abnormal bleeding or blood clot.

## 2023-03-15 ENCOUNTER — ANTI-COAG VISIT (OUTPATIENT)
Age: 78
End: 2023-03-15

## 2023-03-15 DIAGNOSIS — I48.91 AF (ATRIAL FIBRILLATION) (HCC): Primary | ICD-10-CM

## 2023-03-15 LAB — INR BLD: 2.9

## 2023-03-15 NOTE — PATIENT INSTRUCTIONS
Mr. Camila Barriga is here today for anticoagulation monitoring for the diagnosis of atrial fibrillation. His INR goal is 2.0-3.0 and his current Coumadin dose is 7.5 mg alt 10 mg . Today's findings include an INR of 2.9     Considering Mr. Burris's past history, todays findings, and per the coumadin policy/protocol, Mr. Yasemin Jett was instructed to take Coumadin as follows,  continue taking 7.5 mg alt 10 mg daily. He was also instructed to come back in 1 weeks for an INR check. A full discussion of the nature of anticoagulants has been carried out. A full discussion of the need for frequent and regular monitoring, precise dosage adjustment and compliance was stressed. Side effects of potential bleeding were discussed and Mr. Yasemin Jett was instructed to call [phone] if there are any signs of abnormal bleeding. Mr. Yasemin Jett was instructed to avoid any OTC items containing aspirin or ibuprofen and prior to starting any new OTC products to consult with his physician or pharmacist to ensure no drug interactions are present. Mr. Yasemin Jett was instructed to avoid any major changes in his general diet and to avoid alcohol consumption. .      Mr. Yasemin Jett verbalized his understanding of all instructions and will call the office with any questions, concerns, or signs of abnormal bleeding or blood clot.

## 2023-03-22 ENCOUNTER — ANTI-COAG VISIT (OUTPATIENT)
Age: 78
End: 2023-03-22

## 2023-03-22 DIAGNOSIS — I48.91 AF (ATRIAL FIBRILLATION) (HCC): Primary | ICD-10-CM

## 2023-03-22 LAB — INR BLD: 2.9

## 2023-03-22 NOTE — PROGRESS NOTES
Mr. Rosa Church is here today for anticoagulation monitoring for the diagnosis of atrial fibrillation. His INR goal is 2.0-3.0 and his current Coumadin dose is Alternating 7.5 mg and 10 mg every 2 days. Today's findings include an INR of 2.9     Considering Mr. Burris's past history, todays findings, and per the coumadin policy/protocol, Mr. Jennie Reynaga was instructed to take Coumadin as follows,  resume current dose of coumadin. He was also instructed to come back in 1 weeks for an INR check. A full discussion of the nature of anticoagulants has been carried out. A full discussion of the need for frequent and regular monitoring, precise dosage adjustment and compliance was stressed. Side effects of potential bleeding were discussed and Mr. Jennie Reynaga was instructed to call [phone] if there are any signs of abnormal bleeding. Mr. Jennie Reynaga was instructed to avoid any OTC items containing aspirin or ibuprofen and prior to starting any new OTC products to consult with his physician or pharmacist to ensure no drug interactions are present. Mr. Jennie Reynaga was instructed to avoid any major changes in his general diet and to avoid alcohol consumption. .      Mr. Jennie Reynaga verbalized his understanding of all instructions and will call the office with any questions, concerns, or signs of abnormal bleeding or blood clot.

## 2023-03-29 ENCOUNTER — ANTI-COAG VISIT (OUTPATIENT)
Age: 78
End: 2023-03-29

## 2023-03-29 DIAGNOSIS — I48.91 AF (ATRIAL FIBRILLATION) (HCC): Primary | ICD-10-CM

## 2023-03-29 LAB — INR BLD: 2.5

## 2023-03-29 NOTE — PROGRESS NOTES
Mr. Esvin Burnham is here today for anticoagulation monitoring for the diagnosis of atrial fibrillation. His INR goal is 2.0-3.0 and his current Coumadin dose is Alternating 7.5 mg and 10 mg every 2 days. Today's findings include an INR of 2.5     Considering Mr. Burris's past history, todays findings, and per the coumadin policy/protocol, Mr. Nory Marte was instructed to take Coumadin as follows,  resume same dose of coumadin. He was also instructed to come back in 1 weeks for an INR check. A full discussion of the nature of anticoagulants has been carried out. A full discussion of the need for frequent and regular monitoring, precise dosage adjustment and compliance was stressed. Side effects of potential bleeding were discussed and Mr. Nory Marte was instructed to call [phone] if there are any signs of abnormal bleeding. Mr. Nory Marte was instructed to avoid any OTC items containing aspirin or ibuprofen and prior to starting any new OTC products to consult with his physician or pharmacist to ensure no drug interactions are present. Mr. Nory Marte was instructed to avoid any major changes in his general diet and to avoid alcohol consumption. .      Mr. Nory Marte verbalized his understanding of all instructions and will call the office with any questions, concerns, or signs of abnormal bleeding or blood clot.

## 2023-04-03 PROBLEM — E11.9 DIABETES MELLITUS, TYPE 2 (HCC): Status: RESOLVED | Noted: 2021-08-03 | Resolved: 2021-08-03

## 2023-04-05 ENCOUNTER — ANTI-COAG VISIT (OUTPATIENT)
Age: 78
End: 2023-04-05

## 2023-04-05 DIAGNOSIS — I48.91 AF (ATRIAL FIBRILLATION) (HCC): Primary | ICD-10-CM

## 2023-04-05 LAB — INR BLD: 2.4

## 2023-04-05 NOTE — PROGRESS NOTES
Mr. George Martinez is here today for anticoagulation monitoring for the diagnosis of atrial fibrillation. His INR goal is 2.0-3.0 and his current Coumadin dose is Alternating 7.5 mg and 10 mg every 2 days. Today's findings include an INR of 2.4     Considering Mr. Burris's past history, todays findings, and per the coumadin policy/protocol, Mr. Kiet Tyler was instructed to take Coumadin as follows,  resume current dose of coumadin. He was also instructed to come back in 1 weeks for an INR check. A full discussion of the nature of anticoagulants has been carried out. A full discussion of the need for frequent and regular monitoring, precise dosage adjustment and compliance was stressed. Side effects of potential bleeding were discussed and Mr. Kiet Tyler was instructed to call [phone] if there are any signs of abnormal bleeding. Mr. Kiet Tyler was instructed to avoid any OTC items containing aspirin or ibuprofen and prior to starting any new OTC products to consult with his physician or pharmacist to ensure no drug interactions are present. Mr. Kiet Tyler was instructed to avoid any major changes in his general diet and to avoid alcohol consumption. .      Mr. Kiet Tyler verbalized his understanding of all instructions and will call the office with any questions, concerns, or signs of abnormal bleeding or blood clot.

## 2023-04-19 ENCOUNTER — ANTI-COAG VISIT (OUTPATIENT)
Age: 78
End: 2023-04-19

## 2023-04-19 DIAGNOSIS — I48.91 AF (ATRIAL FIBRILLATION) (HCC): Primary | ICD-10-CM

## 2023-04-19 LAB — INR BLD: 2.7

## 2023-04-19 NOTE — PATIENT INSTRUCTIONS
Mr. Santos Mckeon is here today for anticoagulation monitoring for the diagnosis of atrial fibrillation. His INR goal is 2.0-3.0 and his current Coumadin dose is 7.5 mg alt 10 mg daily. Today's findings include an INR of 2.7     Considering Mr. Burris's past history, todays findings, and per the coumadin policy/protocol, Mr. Ghanshyam Dutta was instructed to take Coumadin as follows,  continue taking 7.5 mg alt 10 mg daily. He was also instructed to come back in 1 weeks for an INR check. A full discussion of the nature of anticoagulants has been carried out. A full discussion of the need for frequent and regular monitoring, precise dosage adjustment and compliance was stressed. Side effects of potential bleeding were discussed and Mr. Ghanshyam Dutta was instructed to call [phone] if there are any signs of abnormal bleeding. Mr. Ghanshyam Dutta was instructed to avoid any OTC items containing aspirin or ibuprofen and prior to starting any new OTC products to consult with his physician or pharmacist to ensure no drug interactions are present. Mr. Ghanshyam Dutta was instructed to avoid any major changes in his general diet and to avoid alcohol consumption. .      Mr. Ghanshyam Dutta verbalized his understanding of all instructions and will call the office with any questions, concerns, or signs of abnormal bleeding or blood clot.

## 2023-04-26 ENCOUNTER — ANTI-COAG VISIT (OUTPATIENT)
Age: 78
End: 2023-04-26

## 2023-04-26 DIAGNOSIS — I48.91 AF (ATRIAL FIBRILLATION) (HCC): Primary | ICD-10-CM

## 2023-04-26 LAB — INR BLD: 2.2

## 2023-04-26 NOTE — PROGRESS NOTES
Mr. Ingrid Choudhary is here today for anticoagulation monitoring for the diagnosis of atrial fibrillation. His INR goal is 2.0-3.0 and his current Coumadin dose is 7.5 mg alt 10 mg daily. Today's findings include an INR of 2.2     Considering Mr. Burris's past history, todays findings, and per the coumadin policy/protocol, Mr. Yasmany Osei was instructed to take Coumadin as follows,  continue taking 7.5 mg alt 10 mg daily. He was also instructed to come back in 1 weeks for an INR check. A full discussion of the nature of anticoagulants has been carried out. A full discussion of the need for frequent and regular monitoring, precise dosage adjustment and compliance was stressed. Side effects of potential bleeding were discussed and Mr. Yasmany Osei was instructed to call [phone] if there are any signs of abnormal bleeding. Mr. Yasmany Osei was instructed to avoid any OTC items containing aspirin or ibuprofen and prior to starting any new OTC products to consult with his physician or pharmacist to ensure no drug interactions are present. Mr. Yasmany Osei was instructed to avoid any major changes in his general diet and to avoid alcohol consumption. .      Mr. Yasmany Osei verbalized his understanding of all instructions and will call the office with any questions, concerns, or signs of abnormal bleeding or blood clot.

## 2023-05-03 LAB — INR BLD: 2.3

## 2023-05-05 ENCOUNTER — ANTI-COAG VISIT (OUTPATIENT)
Age: 78
End: 2023-05-05

## 2023-05-05 DIAGNOSIS — I48.91 AF (ATRIAL FIBRILLATION) (HCC): Primary | ICD-10-CM

## 2023-05-05 NOTE — PROGRESS NOTES
Mr. Donell Gandhi is here today for anticoagulation monitoring for the diagnosis of atrial fibrillation. His INR goal is 2.0-3.0 and his current Coumadin dose is Alternating 7.5 mg and 10 mg Every 2 days. Today's findings include an INR of 2.3     Considering Mr. Burris's past history, todays findings, and per the coumadin policy/protocol, Mr. Damian Martell was instructed to take Coumadin as follows,  resume current dose. He was also instructed to come back in 1 weeks for an INR check. A full discussion of the nature of anticoagulants has been carried out. A full discussion of the need for frequent and regular monitoring, precise dosage adjustment and compliance was stressed. Side effects of potential bleeding were discussed and Mr. Damian Martell was instructed to call [phone] if there are any signs of abnormal bleeding. Mr. Damian Martell was instructed to avoid any OTC items containing aspirin or ibuprofen and prior to starting any new OTC products to consult with his physician or pharmacist to ensure no drug interactions are present. Mr. Damian Martell was instructed to avoid any major changes in his general diet and to avoid alcohol consumption. .      Mr. Damian Martell verbalized his understanding of all instructions and will call the office with any questions, concerns, or signs of abnormal bleeding or blood clot.

## 2023-05-10 ENCOUNTER — ANTI-COAG VISIT (OUTPATIENT)
Age: 78
End: 2023-05-10

## 2023-05-10 DIAGNOSIS — I48.91 AF (ATRIAL FIBRILLATION) (HCC): Primary | ICD-10-CM

## 2023-05-10 LAB — INR BLD: 2.6

## 2023-05-10 NOTE — PROGRESS NOTES
Mr. Santos Mckeon is here today for anticoagulation monitoring for the diagnosis of atrial fibrillation. His INR goal is 2.0-3.0 and his current Coumadin dose is Alternating 7.5 mg and 10 mg every 2 days. Today's findings include an INR of 2.6     Considering Mr. Burris's past history, todays findings, and per the coumadin policy/protocol, Mr. Ghanshyam Dutta was instructed to take Coumadin as follows,  resume same dose of coumadin. He was also instructed to come back in 1 weeks for an INR check. A full discussion of the nature of anticoagulants has been carried out. A full discussion of the need for frequent and regular monitoring, precise dosage adjustment and compliance was stressed. Side effects of potential bleeding were discussed and Mr. Ghanshyam Dutta was instructed to call [phone] if there are any signs of abnormal bleeding. Mr. Ghanshyam Dutta was instructed to avoid any OTC items containing aspirin or ibuprofen and prior to starting any new OTC products to consult with his physician or pharmacist to ensure no drug interactions are present. Mr. Ghanshyam Dutta was instructed to avoid any major changes in his general diet and to avoid alcohol consumption. .      Mr. Ghanshyam Dutta verbalized his understanding of all instructions and will call the office with any questions, concerns, or signs of abnormal bleeding or blood clot.

## 2023-05-17 ENCOUNTER — ANTI-COAG VISIT (OUTPATIENT)
Age: 78
End: 2023-05-17

## 2023-05-17 DIAGNOSIS — I48.91 AF (ATRIAL FIBRILLATION) (HCC): Primary | ICD-10-CM

## 2023-05-17 LAB — INR BLD: 2.3

## 2023-05-17 NOTE — PROGRESS NOTES
Mr. Briana Terry is here today for anticoagulation monitoring for the diagnosis of atrial fibrillation. His INR goal is 2.0-3.0 and his current Coumadin dose is alternating 7.5 mg and 10 mg every 2 days. Today's findings include an INR of 2.3     Considering Mr. Burris's past history, todays findings, and per the coumadin policy/protocol, Mr. Jennifer Kimbrough was instructed to take Coumadin as follows,  resume current dose of coumadin. He was also instructed to come back in 1 weeks for an INR check. A full discussion of the nature of anticoagulants has been carried out. A full discussion of the need for frequent and regular monitoring, precise dosage adjustment and compliance was stressed. Side effects of potential bleeding were discussed and Mr. Jennifer Kimbrough was instructed to call [phone] if there are any signs of abnormal bleeding. Mr. Jennifer Kimbrough was instructed to avoid any OTC items containing aspirin or ibuprofen and prior to starting any new OTC products to consult with his physician or pharmacist to ensure no drug interactions are present. Mr. Jennifer Kimbrough was instructed to avoid any major changes in his general diet and to avoid alcohol consumption. .      Mr. Jennifer Kimbrough verbalized his understanding of all instructions and will call the office with any questions, concerns, or signs of abnormal bleeding or blood clot.

## 2023-05-18 RX ORDER — WARFARIN SODIUM 5 MG/1
TABLET ORAL
Qty: 120 TABLET | Refills: 0 | Status: SHIPPED | OUTPATIENT
Start: 2023-05-18

## 2023-05-24 ENCOUNTER — ANTI-COAG VISIT (OUTPATIENT)
Age: 78
End: 2023-05-24

## 2023-05-24 DIAGNOSIS — I48.91 AF (ATRIAL FIBRILLATION) (HCC): Primary | ICD-10-CM

## 2023-05-24 LAB — INR BLD: 2.5

## 2023-05-24 NOTE — PROGRESS NOTES
Mr. Anabela Sheppard is here today for anticoagulation monitoring for the diagnosis of atrial fibrillation. His INR goal is 2.0-3.0 and his current Coumadin dose is Alternating 7.5 mg and 10 mg every 2 days. Today's findings include an INR of 2.5     Considering Mr. Burris's past history, todays findings, and per the coumadin policy/protocol, Mr. Dali Parisi was instructed to take Coumadin as follows,  resume same dose of couamdin. He was also instructed to come back in 1 weeks for an INR check. A full discussion of the nature of anticoagulants has been carried out. A full discussion of the need for frequent and regular monitoring, precise dosage adjustment and compliance was stressed. Side effects of potential bleeding were discussed and Mr. Dali Parisi was instructed to call [phone] if there are any signs of abnormal bleeding. Mr. Dali Parisi was instructed to avoid any OTC items containing aspirin or ibuprofen and prior to starting any new OTC products to consult with his physician or pharmacist to ensure no drug interactions are present. Mr. Dali Parisi was instructed to avoid any major changes in his general diet and to avoid alcohol consumption. .      Mr. Dali Parisi verbalized his understanding of all instructions and will call the office with any questions, concerns, or signs of abnormal bleeding or blood clot.

## 2023-05-31 ENCOUNTER — ANTI-COAG VISIT (OUTPATIENT)
Age: 78
End: 2023-05-31

## 2023-05-31 DIAGNOSIS — I48.91 AF (ATRIAL FIBRILLATION) (HCC): Primary | ICD-10-CM

## 2023-05-31 LAB — INR BLD: 2.2

## 2023-05-31 NOTE — PROGRESS NOTES
Mr. Saira Whitlock is here today for anticoagulation monitoring for the diagnosis of atrial fibrillation. His INR goal is 2.0-3.0 and his current Coumadin dose is Alternating 7.5 mg and 10 mg every 2 days. Today's findings include an INR of 2.2     Considering Mr. Burris's past history, todays findings, and per the coumadin policy/protocol, Mr. Dann Shetty was instructed to take Coumadin as follows,  resume current dose of couamdin. He was also instructed to come back in 1 weeks for an INR check. A full discussion of the nature of anticoagulants has been carried out. A full discussion of the need for frequent and regular monitoring, precise dosage adjustment and compliance was stressed. Side effects of potential bleeding were discussed and Mr. Dnan Shetty was instructed to call [phone] if there are any signs of abnormal bleeding. Mr. Dann Shetty was instructed to avoid any OTC items containing aspirin or ibuprofen and prior to starting any new OTC products to consult with his physician or pharmacist to ensure no drug interactions are present. Mr. Dann Shetty was instructed to avoid any major changes in his general diet and to avoid alcohol consumption. .      Mr. Dann Shetty verbalized his understanding of all instructions and will call the office with any questions, concerns, or signs of abnormal bleeding or blood clot.

## 2023-06-05 RX ORDER — METOPROLOL SUCCINATE 25 MG/1
TABLET, EXTENDED RELEASE ORAL
Qty: 180 TABLET | Refills: 0 | Status: SHIPPED | OUTPATIENT
Start: 2023-06-05

## 2023-06-07 ENCOUNTER — ANTI-COAG VISIT (OUTPATIENT)
Age: 78
End: 2023-06-07

## 2023-06-07 DIAGNOSIS — I48.91 AF (ATRIAL FIBRILLATION) (HCC): Primary | ICD-10-CM

## 2023-06-07 LAB — INR BLD: 2.2

## 2023-06-07 NOTE — PROGRESS NOTES
Mr. Queenie Carver is here today for anticoagulation monitoring for the diagnosis of atrial fibrillation. His INR goal is 2.0-3.0 and his current Coumadin dose is Alternating 7.5 mg and then 10 mg every 2 days. Today's findings include an INR of 2.2     Considering Mr. Burris's past history, todays findings, and per the coumadin policy/protocol, Mr. Vesta Maria was instructed to take Coumadin as follows,  resume current dose of coumadin. He was also instructed to come back in 1 weeks for an INR check. A full discussion of the nature of anticoagulants has been carried out. A full discussion of the need for frequent and regular monitoring, precise dosage adjustment and compliance was stressed. Side effects of potential bleeding were discussed and Mr. Vesta Maria was instructed to call [phone] if there are any signs of abnormal bleeding. Mr. Vesta Maria was instructed to avoid any OTC items containing aspirin or ibuprofen and prior to starting any new OTC products to consult with his physician or pharmacist to ensure no drug interactions are present. Mr. Vesta Maria was instructed to avoid any major changes in his general diet and to avoid alcohol consumption. .      Mr. Vesta Maria verbalized his understanding of all instructions and will call the office with any questions, concerns, or signs of abnormal bleeding or blood clot.

## 2023-06-21 ENCOUNTER — ANTI-COAG VISIT (OUTPATIENT)
Age: 78
End: 2023-06-21

## 2023-06-21 DIAGNOSIS — I48.91 AF (ATRIAL FIBRILLATION) (HCC): Primary | ICD-10-CM

## 2023-06-21 LAB — INR BLD: 2.4

## 2023-06-21 NOTE — PROGRESS NOTES
Mr. Conrad Templeton is an established anticoagulation Pt. Left message regarding coumadin instructions as indicated on patient chart--    Mr. Jessy Main is here today for anticoagulation monitoring for the diagnosis of atrial fibrillation. His INR goal is 2.0-3.0 and his current Coumadin dose is 7.5 mg then 10 mg repeating every two days. Today's findings include an INR of 2.4     Considering Mr. Burris's past history, todays findings, and per the coumadin policy/protocol, Mr. Conrad Templeton was instructed to take Coumadin as follows,  7.5 mg then 10 mg repeating every two days. He was also instructed to come back in 1 weeks (06/28/2023) for an INR check. In my message, I included the need for frequent and regular monitoring, precise dosage adjustment and compliance. Mr. Conrad Templeton was instructed to call [phone] if there are any signs of abnormal bleeding, blood clot, or changes to medications including OTC items.

## 2023-06-28 ENCOUNTER — ANTI-COAG VISIT (OUTPATIENT)
Age: 78
End: 2023-06-28

## 2023-06-28 DIAGNOSIS — I48.91 AF (ATRIAL FIBRILLATION) (HCC): Primary | ICD-10-CM

## 2023-06-28 LAB — INR BLD: 2.5

## 2023-07-05 ENCOUNTER — ANTI-COAG VISIT (OUTPATIENT)
Age: 78
End: 2023-07-05

## 2023-07-05 DIAGNOSIS — I48.91 AF (ATRIAL FIBRILLATION) (HCC): Primary | ICD-10-CM

## 2023-07-05 LAB — INR BLD: 2.2

## 2023-07-05 NOTE — PROGRESS NOTES
Mr. Pravin Don is here today for anticoagulation monitoring for the diagnosis of atrial fibrillation. His INR goal is 2.0-3.0 and his current Coumadin dose is Alternating 7.5 mg then 10 mg every 2 days. Today's findings include an INR of 2.2     Considering Mr. Burris's past history, todays findings, and per the coumadin policy/protocol, Mr. Zan Villegas was instructed to take Coumadin as follows,  resume same dose of coumadin. He was also instructed to come back in 1 weeks for an INR check. A full discussion of the nature of anticoagulants has been carried out. A full discussion of the need for frequent and regular monitoring, precise dosage adjustment and compliance was stressed. Side effects of potential bleeding were discussed and Mr. Zan Villegas was instructed to call [phone] if there are any signs of abnormal bleeding. Mr. Zan Villegas was instructed to avoid any OTC items containing aspirin or ibuprofen and prior to starting any new OTC products to consult with his physician or pharmacist to ensure no drug interactions are present. Mr. Zan Villegas was instructed to avoid any major changes in his general diet and to avoid alcohol consumption. .      Mr. Zan Villegas verbalized his understanding of all instructions and will call the office with any questions, concerns, or signs of abnormal bleeding or blood clot.

## 2023-07-11 ENCOUNTER — OFFICE VISIT (OUTPATIENT)
Age: 78
End: 2023-07-11

## 2023-07-11 VITALS
DIASTOLIC BLOOD PRESSURE: 78 MMHG | HEART RATE: 81 BPM | BODY MASS INDEX: 22.66 KG/M2 | SYSTOLIC BLOOD PRESSURE: 124 MMHG | HEIGHT: 69 IN | WEIGHT: 153 LBS | OXYGEN SATURATION: 98 %

## 2023-07-11 DIAGNOSIS — Z79.01 LONG TERM (CURRENT) USE OF ANTICOAGULANTS: ICD-10-CM

## 2023-07-11 DIAGNOSIS — I11.9 HYPERTENSIVE HEART DISEASE WITHOUT HEART FAILURE: ICD-10-CM

## 2023-07-11 DIAGNOSIS — I48.21 PERMANENT ATRIAL FIBRILLATION (HCC): Primary | ICD-10-CM

## 2023-07-11 DIAGNOSIS — I34.0 NONRHEUMATIC MITRAL (VALVE) INSUFFICIENCY: ICD-10-CM

## 2023-07-11 DIAGNOSIS — I35.0 NONRHEUMATIC AORTIC (VALVE) STENOSIS: ICD-10-CM

## 2023-07-11 DIAGNOSIS — I48.92 ATRIAL FLUTTER, UNSPECIFIED TYPE (HCC): ICD-10-CM

## 2023-07-11 LAB — DIGOXIN LEVEL: 0.5 NG/ML (ref 0.8–2)

## 2023-07-11 ASSESSMENT — PATIENT HEALTH QUESTIONNAIRE - PHQ9
2. FEELING DOWN, DEPRESSED OR HOPELESS: 0
SUM OF ALL RESPONSES TO PHQ9 QUESTIONS 1 & 2: 0
SUM OF ALL RESPONSES TO PHQ QUESTIONS 1-9: 0
1. LITTLE INTEREST OR PLEASURE IN DOING THINGS: 0
SUM OF ALL RESPONSES TO PHQ QUESTIONS 1-9: 0
DEPRESSION UNABLE TO ASSESS: FUNCTIONAL CAPACITY MOTIVATION LIMITS ACCURACY
SUM OF ALL RESPONSES TO PHQ QUESTIONS 1-9: 0
SUM OF ALL RESPONSES TO PHQ QUESTIONS 1-9: 0

## 2023-07-11 NOTE — PROGRESS NOTES
HISTORY OF PRESENT ILLNESS  Teto Mixon is a 78 y.o. male. Follow-up  Associated symptoms include shortness of breath. Palpitations   The history is provided by the Patient. This is a chronic problem. The current episode started more than 1 week ago. The problem has not changed since onset. The problem occurs daily. Associated symptoms include shortness of breath. Pertinent negatives include no diaphoresis, no fever, no claudication, no near-syncope, no orthopnea, no PND, no syncope, no nausea, no vomiting, no leg pain, no dizziness, no weakness, no cough, no hemoptysis and no sputum production. Risk factors include dyslipidemia, hypertension and male gender. His past medical history is significant for atrial fibrillation. His past medical history does not include hyperthyroidism. Shortness of Breath  The history is provided by the Patient. This is a chronic problem. The problem occurs intermittently. The current episode started more than 1 week ago. The problem has not changed since onset. Pertinent negatives include no fever, no ear pain, no neck pain, no cough, no sputum production, no hemoptysis, no wheezing, no PND, no orthopnea, no syncope, no vomiting, no rash, no leg pain, no leg swelling and no claudication. He has had Prior hospitalizations. Associated medical issues do not include chronic lung disease, CAD or heart failure. Review of Systems   Constitutional:  Negative for chills, diaphoresis, fever and weight loss. HENT:  Negative for ear pain and hearing loss. Eyes:  Negative for blurred vision. Respiratory:  Positive for shortness of breath. Negative for cough, hemoptysis, sputum production, wheezing and stridor. Cardiovascular:  Positive for palpitations. Negative for orthopnea, claudication, leg swelling, syncope, PND and near-syncope. Gastrointestinal:  Negative for heartburn, nausea and vomiting. Musculoskeletal:  Negative for myalgias and neck pain.    Skin:  Negative for

## 2023-07-11 NOTE — PROGRESS NOTES
1. Have you been to the ER, urgent care clinic since your last visit? Hospitalized since your last visit? Yes, [facility]    2. Have you seen or consulted any other health care providers outside of the [address] [place] [address] since your last visit? Include any pap smears or colon screening.       No

## 2023-07-12 ENCOUNTER — ANTI-COAG VISIT (OUTPATIENT)
Age: 78
End: 2023-07-12

## 2023-07-12 DIAGNOSIS — I48.91 AF (ATRIAL FIBRILLATION) (HCC): Primary | ICD-10-CM

## 2023-07-12 LAB — INR BLD: 2.3

## 2023-07-12 NOTE — PROGRESS NOTES
Mr. Bre Black is here today for anticoagulation monitoring for the diagnosis of atrial fibrillation. His INR goal is 2.0-3.0 and his current Coumadin dose is Alternating 10 mg and 7.5 mg every 2 days. Today's findings include an INR of 2.3     Considering Mr. Burris's past history, todays findings, and per the coumadin policy/protocol, Mr. Tico Silverio was instructed to take Coumadin as follows,  resume current dose of coumadin. He was also instructed to come back in 1 weeks for an INR check. A full discussion of the nature of anticoagulants has been carried out. A full discussion of the need for frequent and regular monitoring, precise dosage adjustment and compliance was stressed. Side effects of potential bleeding were discussed and Mr. Tico Silverio was instructed to call [phone] if there are any signs of abnormal bleeding. Mr. Tico Silverio was instructed to avoid any OTC items containing aspirin or ibuprofen and prior to starting any new OTC products to consult with his physician or pharmacist to ensure no drug interactions are present. Mr. Tico Silverio was instructed to avoid any major changes in his general diet and to avoid alcohol consumption. .      Mr. Tico Silverio verbalized his understanding of all instructions and will call the office with any questions, concerns, or signs of abnormal bleeding or blood clot.

## 2023-07-19 ENCOUNTER — ANTI-COAG VISIT (OUTPATIENT)
Age: 78
End: 2023-07-19

## 2023-07-19 DIAGNOSIS — I48.91 AF (ATRIAL FIBRILLATION) (HCC): Primary | ICD-10-CM

## 2023-07-19 LAB — INR BLD: 2.5

## 2023-07-19 NOTE — PROGRESS NOTES
Mr. Noreen Downey is here today for anticoagulation monitoring for the diagnosis of atrial fibrillation. His INR goal is 2.0-3.0 and his current Coumadin dose is Alternating 7.5 mg and 10 mg every 2 days. Today's findings include an INR of 2.5     Considering Mr. Burris's past history, todays findings, and per the coumadin policy/protocol, Mr. Philip Mcnulty was instructed to take Coumadin as follows,  resume current dose of coumadin. He was also instructed to come back in 1 weeks for an INR check. A full discussion of the nature of anticoagulants has been carried out. A full discussion of the need for frequent and regular monitoring, precise dosage adjustment and compliance was stressed. Side effects of potential bleeding were discussed and Mr. Philip Mcnulty was instructed to call [phone] if there are any signs of abnormal bleeding. Mr. Philip Mcnulty was instructed to avoid any OTC items containing aspirin or ibuprofen and prior to starting any new OTC products to consult with his physician or pharmacist to ensure no drug interactions are present. Mr. Philip Mcnulty was instructed to avoid any major changes in his general diet and to avoid alcohol consumption. .      Mr. Philip Mcnulty verbalized his understanding of all instructions and will call the office with any questions, concerns, or signs of abnormal bleeding or blood clot.

## 2023-07-26 ENCOUNTER — ANTI-COAG VISIT (OUTPATIENT)
Age: 78
End: 2023-07-26

## 2023-07-26 DIAGNOSIS — I48.91 AF (ATRIAL FIBRILLATION) (HCC): Primary | ICD-10-CM

## 2023-07-26 LAB — INR BLD: 3

## 2023-07-26 NOTE — PROGRESS NOTES
Mr. Pravin Don is here today for anticoagulation monitoring for the diagnosis of atrial fibrillation. His INR goal is 2.0-3.0 and his current Coumadin dose is Alternating  7.5 mg and 10 mg every 2 days. Today's findings include an INR of 3.0     Considering Mr. Burris's past history, todays findings, and per the coumadin policy/protocol, Mr. Zan Villegas was instructed to take Coumadin as follows,  resume current dose of coumadin. He was also instructed to come back in 1 weeks for an INR check. A full discussion of the nature of anticoagulants has been carried out. A full discussion of the need for frequent and regular monitoring, precise dosage adjustment and compliance was stressed. Side effects of potential bleeding were discussed and Mr. Zan Villegas was instructed to call [phone] if there are any signs of abnormal bleeding. Mr. Zan Villegas was instructed to avoid any OTC items containing aspirin or ibuprofen and prior to starting any new OTC products to consult with his physician or pharmacist to ensure no drug interactions are present. Mr. Zan Villegas was instructed to avoid any major changes in his general diet and to avoid alcohol consumption. .      Mr. Zan Villegas verbalized his understanding of all instructions and will call the office with any questions, concerns, or signs of abnormal bleeding or blood clot.

## 2023-08-02 ENCOUNTER — ANTI-COAG VISIT (OUTPATIENT)
Age: 78
End: 2023-08-02

## 2023-08-02 DIAGNOSIS — I48.91 AF (ATRIAL FIBRILLATION) (HCC): Primary | ICD-10-CM

## 2023-08-02 LAB — INR BLD: 2.6

## 2023-08-02 NOTE — PROGRESS NOTES
Mr. Vincenzo Kolb is here today for anticoagulation monitoring for the diagnosis of atrial fibrillation. His INR goal is 2.0-3.0 and his current Coumadin dose is Alternating 7.5 mg and 10 mg every 2 days    Today's findings include an INR of 2.6     Considering Mr. Burris's past history, todays findings, and per the coumadin policy/protocol, Mr. Valarie Camarillo was instructed to take Coumadin as follows,  resume current dose of coumadin. He was also instructed to come back in 1 weeks for an INR check. A full discussion of the nature of anticoagulants has been carried out. A full discussion of the need for frequent and regular monitoring, precise dosage adjustment and compliance was stressed. Side effects of potential bleeding were discussed and Mr. Valarie Camarillo was instructed to call [phone] if there are any signs of abnormal bleeding. Mr. Valarie Camarillo was instructed to avoid any OTC items containing aspirin or ibuprofen and prior to starting any new OTC products to consult with his physician or pharmacist to ensure no drug interactions are present. Mr. Valarie Camarillo was instructed to avoid any major changes in his general diet and to avoid alcohol consumption. .      Mr. Valarie Camarillo verbalized his understanding of all instructions and will call the office with any questions, concerns, or signs of abnormal bleeding or blood clot.

## 2023-08-09 ENCOUNTER — ANTI-COAG VISIT (OUTPATIENT)
Age: 78
End: 2023-08-09

## 2023-08-09 DIAGNOSIS — I48.91 AF (ATRIAL FIBRILLATION) (HCC): Primary | ICD-10-CM

## 2023-08-09 LAB — INR BLD: 2.6

## 2023-08-09 NOTE — PROGRESS NOTES
Mr. Julia Patel is here today for anticoagulation monitoring for the diagnosis of atrial fibrillation. His INR goal is 2.0-3.0 and his current Coumadin dose is 7.5 mg then 10 mg, repeating every two days. Today's findings include an INR of 2.6. Considering Mr. Burris's past history, todays findings, and per the coumadin policy/protocol, Mr. Christel Gleason was instructed to take Coumadin as follows, 7.5 mg then 10 mg, repeating every two days. Mehran Rasheed He was also instructed to come back in 1 weeks (08/16/2023) for an INR check. A full discussion of the nature of anticoagulants has been carried out. A full discussion of the need for frequent and regular monitoring, precise dosage adjustment and compliance was stressed. Side effects of potential bleeding were discussed and Mr. Christel Gleason was instructed to call [phone] if there are any signs of abnormal bleeding. Mr. Christel Gleason was instructed to avoid any OTC items containing aspirin or ibuprofen and prior to starting any new OTC products to consult with his physician or pharmacist to ensure no drug interactions are present. Mr. Christel Gleason was instructed to avoid any major changes in his general diet and to avoid alcohol consumption. .      Mr. Christel Gleason verbalized his understanding of all instructions and will call the office with any questions, concerns, or signs of abnormal bleeding or blood clot.

## 2023-08-16 ENCOUNTER — ANTI-COAG VISIT (OUTPATIENT)
Age: 78
End: 2023-08-16

## 2023-08-16 DIAGNOSIS — I48.91 AF (ATRIAL FIBRILLATION) (HCC): Primary | ICD-10-CM

## 2023-08-16 LAB — INR BLD: 2.3

## 2023-08-16 NOTE — PROGRESS NOTES
Mr. Yury Esparza is here today for anticoagulation monitoring for the diagnosis of atrial fibrillation. His INR goal is 2.0-3.0 and his current Coumadin dose is 7.5 mg alt 10 mg daily. Today's findings include an INR of 2.3     Considering Mr. Burris's past history, todays findings, and per the coumadin policy/protocol, Mr. Lauri Louie was instructed to take Coumadin as follows,  continue taking 7.5 mg alt 10 mg daily. He was also instructed to come back in 1 weeks for an INR check. A full discussion of the nature of anticoagulants has been carried out. A full discussion of the need for frequent and regular monitoring, precise dosage adjustment and compliance was stressed. Side effects of potential bleeding were discussed and Mr. Lauri Louie was instructed to call [phone] if there are any signs of abnormal bleeding. Mr. Lauri Louie was instructed to avoid any OTC items containing aspirin or ibuprofen and prior to starting any new OTC products to consult with his physician or pharmacist to ensure no drug interactions are present. Mr. Lauri Louie was instructed to avoid any major changes in his general diet and to avoid alcohol consumption. .      Mr. Lauri Louie verbalized his understanding of all instructions and will call the office with any questions, concerns, or signs of abnormal bleeding or blood clot.

## 2023-08-23 ENCOUNTER — ANTI-COAG VISIT (OUTPATIENT)
Age: 78
End: 2023-08-23

## 2023-08-23 DIAGNOSIS — I48.91 AF (ATRIAL FIBRILLATION) (HCC): Primary | ICD-10-CM

## 2023-08-23 LAB — INR BLD: 2.6

## 2023-08-24 NOTE — PATIENT INSTRUCTIONS
Mr. Nicolas Lutz is here today for anticoagulation monitoring for the diagnosis of atrial fibrillation. His INR goal is 2.0-3.0 and his current Coumadin dose is 7.5 mg alt 10 mg . Today's findings include an INR of 2.6     Considering Mr. Burris's past history, todays findings, and per the coumadin policy/protocol, Mr. Joaquin Fernandez was instructed to take Coumadin as follows,  7.5 mg alth 10 mg. He was also instructed to come back in 2 weeks for an INR check. A full discussion of the nature of anticoagulants has been carried out. A full discussion of the need for frequent and regular monitoring, precise dosage adjustment and compliance was stressed. Side effects of potential bleeding were discussed and Mr. Joaquin Fernandez was instructed to call [phone] if there are any signs of abnormal bleeding. Mr. Joaquin Fernandez was instructed to avoid any OTC items containing aspirin or ibuprofen and prior to starting any new OTC products to consult with his physician or pharmacist to ensure no drug interactions are present. Mr. Joaquin Fernandez was instructed to avoid any major changes in his general diet and to avoid alcohol consumption. .      Mr. Joaquin Fernandez verbalized his understanding of all instructions and will call the office with any questions, concerns, or signs of abnormal bleeding or blood clot.

## 2023-08-30 LAB — INR BLD: 2.5

## 2023-08-31 ENCOUNTER — ANTI-COAG VISIT (OUTPATIENT)
Age: 78
End: 2023-08-31

## 2023-08-31 DIAGNOSIS — I48.91 AF (ATRIAL FIBRILLATION) (HCC): Primary | ICD-10-CM

## 2023-08-31 NOTE — PROGRESS NOTES
Mr. Nyasia Jackson is here today for anticoagulation monitoring for the diagnosis of atrial fibrillation. His INR goal is  and his current Coumadin dose is Alternating 7.5 mg and 10 mg every 2 days. Today's findings include an INR of 2.5    Considering Mr. Burris's past history, todays findings, and per the coumadin policy/protocol, Mr. Marisel Zayas was instructed to take Coumadin as follows,  resume current dose of coumadin. He was also instructed to come back in 1 weeks for an INR check. A full discussion of the nature of anticoagulants has been carried out. A full discussion of the need for frequent and regular monitoring, precise dosage adjustment and compliance was stressed. Side effects of potential bleeding were discussed and Mr. Marisel Zayas was instructed to call [phone] if there are any signs of abnormal bleeding. Mr. Marisel Zayas was instructed to avoid any OTC items containing aspirin or ibuprofen and prior to starting any new OTC products to consult with his physician or pharmacist to ensure no drug interactions are present. Mr. Marisel Zayas was instructed to avoid any major changes in his general diet and to avoid alcohol consumption. .      Mr. Marisel Zayas verbalized his understanding of all instructions and will call the office with any questions, concerns, or signs of abnormal bleeding or blood clot.

## 2023-09-06 ENCOUNTER — ANTI-COAG VISIT (OUTPATIENT)
Age: 78
End: 2023-09-06

## 2023-09-06 DIAGNOSIS — I48.91 AF (ATRIAL FIBRILLATION) (HCC): Primary | ICD-10-CM

## 2023-09-06 LAB — INR BLD: 2.5

## 2023-09-06 NOTE — PROGRESS NOTES
Mr. Jos Fermin is here today for anticoagulation monitoring for the diagnosis of atrial fibrillation. His INR goal is 2.0-3.0 and his current Coumadin dose is alternating 7.5 mg and 10 mg every 2 days. Today's findings include an INR of 2.5     Considering Mr. Burris's past history, todays findings, and per the coumadin policy/protocol, Mr. Chaya Cortez was instructed to take Coumadin as follows,  resume current dose of coumadin. He was also instructed to come back in 1 weeks for an INR check. A full discussion of the nature of anticoagulants has been carried out. A full discussion of the need for frequent and regular monitoring, precise dosage adjustment and compliance was stressed. Side effects of potential bleeding were discussed and Mr. Chaya Cortez was instructed to call [phone] if there are any signs of abnormal bleeding. Mr. Chaya Cortez was instructed to avoid any OTC items containing aspirin or ibuprofen and prior to starting any new OTC products to consult with his physician or pharmacist to ensure no drug interactions are present. Mr. Chaya Cortez was instructed to avoid any major changes in his general diet and to avoid alcohol consumption. .      Mr. Chaya Cortez verbalized his understanding of all instructions and will call the office with any questions, concerns, or signs of abnormal bleeding or blood clot.

## 2023-09-11 RX ORDER — METOPROLOL SUCCINATE 25 MG/1
25 TABLET, EXTENDED RELEASE ORAL 2 TIMES DAILY
Qty: 180 TABLET | Refills: 0 | Status: SHIPPED | OUTPATIENT
Start: 2023-09-11

## 2023-09-13 ENCOUNTER — ANTI-COAG VISIT (OUTPATIENT)
Age: 78
End: 2023-09-13

## 2023-09-13 DIAGNOSIS — I48.91 AF (ATRIAL FIBRILLATION) (HCC): Primary | ICD-10-CM

## 2023-09-13 LAB — INR BLD: 2.8

## 2023-09-13 NOTE — PROGRESS NOTES
Mr. Aretha Stratton is here today for anticoagulation monitoring for the diagnosis of atrial fibrillation. His INR goal is 2.0-3.0 and his current Coumadin dose is 7.5 mg and 10 mg. Today's findings include an INR of 2.8     Considering Mr. Burris's past history, todays findings, and per the coumadin policy/protocol, Mr. Soheila Joseph was instructed to take Coumadin as follows,  7. 5 mg, then 10 mg repeating every 2 days. He was also instructed to come back in 1 weeks for an INR check. A full discussion of the nature of anticoagulants has been carried out. A full discussion of the need for frequent and regular monitoring, precise dosage adjustment and compliance was stressed. Side effects of potential bleeding were discussed and Mr. Soheila Joseph was instructed to call [phone] if there are any signs of abnormal bleeding. Mr. Soheila Joseph was instructed to avoid any OTC items containing aspirin or ibuprofen and prior to starting any new OTC products to consult with his physician or pharmacist to ensure no drug interactions are present. Mr. Soheila Joseph was instructed to avoid any major changes in his general diet and to avoid alcohol consumption. .      Mr. Soheila Joseph verbalized his understanding of all instructions and will call the office with any questions, concerns, or signs of abnormal bleeding or blood clot.

## 2023-09-20 ENCOUNTER — ANTI-COAG VISIT (OUTPATIENT)
Age: 78
End: 2023-09-20

## 2023-09-20 DIAGNOSIS — I48.91 AF (ATRIAL FIBRILLATION) (HCC): Primary | ICD-10-CM

## 2023-09-20 LAB — INR BLD: 2.6

## 2023-09-20 NOTE — PROGRESS NOTES
Mr. Serene Adkins is here today for anticoagulation monitoring for the diagnosis of atrial fibrillation. His INR goal is 2.0-3.0 and his current Coumadin dose is Alternating 10 mg and 7.5 mg every 2 days. Today's findings include an INR of 2.6     Considering Mr. Burris's past history, todays findings, and per the coumadin policy/protocol, Mr. Mario Boateng was instructed to take Coumadin as follows,  resume current dose of coumadin. He was also instructed to come back in 1 weeks for an INR check. A full discussion of the nature of anticoagulants has been carried out. A full discussion of the need for frequent and regular monitoring, precise dosage adjustment and compliance was stressed. Side effects of potential bleeding were discussed and Mr. Mario Boateng was instructed to call [phone] if there are any signs of abnormal bleeding. Mr. Mario Boateng was instructed to avoid any OTC items containing aspirin or ibuprofen and prior to starting any new OTC products to consult with his physician or pharmacist to ensure no drug interactions are present. Mr. Mario Boateng was instructed to avoid any major changes in his general diet and to avoid alcohol consumption. .      Mr. Mario Boateng verbalized his understanding of all instructions and will call the office with any questions, concerns, or signs of abnormal bleeding or blood clot.

## 2023-09-27 ENCOUNTER — ANTI-COAG VISIT (OUTPATIENT)
Age: 78
End: 2023-09-27

## 2023-09-27 DIAGNOSIS — I48.91 AF (ATRIAL FIBRILLATION) (HCC): Primary | ICD-10-CM

## 2023-09-27 LAB — INR BLD: 2.3

## 2023-09-27 NOTE — PROGRESS NOTES
Mr. Nina Martinez is here today for anticoagulation monitoring for the diagnosis of atrial fibrillation. His INR goal is 2.0-3.0 and his current Coumadin dose is 7.5 mg and 10 mg every 2 days. Today's findings include an INR of 2.3     Considering Mr. Burris's past history, todays findings, and per the coumadin policy/protocol, Mr. Martín Aguiar was instructed to take Coumadin as follows,  resume same dose of coumadin. He was also instructed to come back in 1 weeks for an INR check. A full discussion of the nature of anticoagulants has been carried out. A full discussion of the need for frequent and regular monitoring, precise dosage adjustment and compliance was stressed. Side effects of potential bleeding were discussed and Mr. Martín Aguiar was instructed to call [phone] if there are any signs of abnormal bleeding. Mr. Martín Aguiar was instructed to avoid any OTC items containing aspirin or ibuprofen and prior to starting any new OTC products to consult with his physician or pharmacist to ensure no drug interactions are present. Mr. Martín Aguiar was instructed to avoid any major changes in his general diet and to avoid alcohol consumption. .      Mr. Martín Aguiar verbalized his understanding of all instructions and will call the office with any questions, concerns, or signs of abnormal bleeding or blood clot. no

## 2023-10-04 ENCOUNTER — ANTI-COAG VISIT (OUTPATIENT)
Age: 78
End: 2023-10-04

## 2023-10-04 DIAGNOSIS — I48.91 AF (ATRIAL FIBRILLATION) (HCC): Primary | ICD-10-CM

## 2023-10-04 LAB — INR BLD: 2.4

## 2023-10-04 RX ORDER — WARFARIN SODIUM 5 MG/1
TABLET ORAL
Qty: 120 TABLET | Refills: 0 | Status: SHIPPED | OUTPATIENT
Start: 2023-10-04

## 2023-10-04 NOTE — PROGRESS NOTES
Mr. Monik Valenzuela is here today for anticoagulation monitoring for the diagnosis of atrial fibrillation. His INR goal is 2.0-3.0 and his current Coumadin dose is ALTERNATING 7.5 MG AND 10 MG EVERY 2 DAYS. Today's findings include an INR of 2.4     Considering Mr. Burris's past history, todays findings, and per the coumadin policy/protocol, Mr. Tim Nair was instructed to take Coumadin as follows,  RESUME CURRENT DOSE OF COUMADIN. He was also instructed to come back in 1 weeks for an INR check. A full discussion of the nature of anticoagulants has been carried out. A full discussion of the need for frequent and regular monitoring, precise dosage adjustment and compliance was stressed. Side effects of potential bleeding were discussed and Mr. Tim Nair was instructed to call [phone] if there are any signs of abnormal bleeding. Mr. Tim Nair was instructed to avoid any OTC items containing aspirin or ibuprofen and prior to starting any new OTC products to consult with his physician or pharmacist to ensure no drug interactions are present. Mr. Tim Nair was instructed to avoid any major changes in his general diet and to avoid alcohol consumption. .      Mr. Tim Nair verbalized his understanding of all instructions and will call the office with any questions, concerns, or signs of abnormal bleeding or blood clot.

## 2023-10-11 ENCOUNTER — ANTI-COAG VISIT (OUTPATIENT)
Age: 78
End: 2023-10-11

## 2023-10-11 DIAGNOSIS — I48.91 AF (ATRIAL FIBRILLATION) (HCC): Primary | ICD-10-CM

## 2023-10-11 LAB — INR BLD: 2.7

## 2023-10-11 NOTE — PROGRESS NOTES
Mr. Iris Morales is here today for anticoagulation monitoring for the diagnosis of atrial fibrillation. His INR goal is 2.0-3.0 and his current Coumadin dose is Alternating 7.5 mg and 10 mg every 2 days. Today's findings include an INR of  2.7    Considering Mr. Burris's past history, todays findings, and per the coumadin policy/protocol, Mr. Bronwyn Cheema was instructed to take Coumadin as follows,  resume current dose of coumadin. He was also instructed to come back in 1 weeks for an INR check. A full discussion of the nature of anticoagulants has been carried out. A full discussion of the need for frequent and regular monitoring, precise dosage adjustment and compliance was stressed. Side effects of potential bleeding were discussed and Mr. Bronwyn Cheema was instructed to call [phone] if there are any signs of abnormal bleeding. Mr. Bronwyn Cheema was instructed to avoid any OTC items containing aspirin or ibuprofen and prior to starting any new OTC products to consult with his physician or pharmacist to ensure no drug interactions are present. Mr. Bronwyn Cheema was instructed to avoid any major changes in his general diet and to avoid alcohol consumption. .      Mr. Bronwyn Cheema verbalized his understanding of all instructions and will call the office with any questions, concerns, or signs of abnormal bleeding or blood clot.

## 2023-10-18 ENCOUNTER — ANTI-COAG VISIT (OUTPATIENT)
Age: 78
End: 2023-10-18

## 2023-10-18 DIAGNOSIS — I48.91 AF (ATRIAL FIBRILLATION) (HCC): Primary | ICD-10-CM

## 2023-10-18 LAB — INR BLD: 2.7

## 2023-10-18 NOTE — PROGRESS NOTES
Mr. Louie Tafoya is here today for anticoagulation monitoring for the diagnosis of atrial fibrillation. His INR goal is 2.0-3.0 and his current Coumadin dose is alternating 7.5 mg and 10 mg every 2 days. Today's findings include an INR of 2.7     Considering Mr. Burris's past history, todays findings, and per the coumadin policy/protocol, Mr. Ann Plaza was instructed to take Coumadin as follows,  resume same dose of coumadin. He was also instructed to come back in 1 weeks for an INR check. A full discussion of the nature of anticoagulants has been carried out. A full discussion of the need for frequent and regular monitoring, precise dosage adjustment and compliance was stressed. Side effects of potential bleeding were discussed and Mr. Ann Plaza was instructed to call [phone] if there are any signs of abnormal bleeding. Mr. Ann Plaza was instructed to avoid any OTC items containing aspirin or ibuprofen and prior to starting any new OTC products to consult with his physician or pharmacist to ensure no drug interactions are present. Mr. Ann Plaza was instructed to avoid any major changes in his general diet and to avoid alcohol consumption. .      Mr. Ann Plaza verbalized his understanding of all instructions and will call the office with any questions, concerns, or signs of abnormal bleeding or blood clot.

## 2023-10-25 ENCOUNTER — ANTI-COAG VISIT (OUTPATIENT)
Age: 78
End: 2023-10-25

## 2023-10-25 DIAGNOSIS — I48.91 AF (ATRIAL FIBRILLATION) (HCC): Primary | ICD-10-CM

## 2023-10-25 LAB — INR BLD: 2.5

## 2023-10-25 NOTE — PROGRESS NOTES
Mr. Ander Palma is here today for anticoagulation monitoring for the diagnosis of atrial fibrillation. His INR goal is 2.0-3.0 and his current Coumadin dose is Alternating 7.5 mg and 10 mg every 2 days    Today's findings include an INR of 2.5     Considering Mr. Burris's past history, todays findings, and per the coumadin policy/protocol, Mr. Julien Michelle was instructed to take Coumadin as follows,  resume same dose of coumadin. He was also instructed to come back in 1 weeks for an INR check. A full discussion of the nature of anticoagulants has been carried out. A full discussion of the need for frequent and regular monitoring, precise dosage adjustment and compliance was stressed. Side effects of potential bleeding were discussed and Mr. Julien Michelle was instructed to call [phone] if there are any signs of abnormal bleeding. Mr. Julien Michelle was instructed to avoid any OTC items containing aspirin or ibuprofen and prior to starting any new OTC products to consult with his physician or pharmacist to ensure no drug interactions are present. Mr. Julien Michelle was instructed to avoid any major changes in his general diet and to avoid alcohol consumption. .      Mr. Julien Michelle verbalized his understanding of all instructions and will call the office with any questions, concerns, or signs of abnormal bleeding or blood clot.

## 2023-11-01 ENCOUNTER — ANTI-COAG VISIT (OUTPATIENT)
Age: 78
End: 2023-11-01

## 2023-11-01 DIAGNOSIS — I48.91 AF (ATRIAL FIBRILLATION) (HCC): Primary | ICD-10-CM

## 2023-11-01 LAB — INR BLD: 2.3

## 2023-11-01 NOTE — PROGRESS NOTES
Mr. Golden Rogel is here today for anticoagulation monitoring for the diagnosis of atrial fibrillation. His INR goal is 2.0-3.0 and his current Coumadin dose is 7.5 mg then 10 mg every 2 days. Today's findings include an INR of 2.3     Considering Mr. Burris's past history, todays findings, and per the coumadin policy/protocol, Mr. Truman Stevenson was instructed to take Coumadin as follows,  resume current dose of coumadin. He was also instructed to come back in 1 weeks for an INR check. A full discussion of the nature of anticoagulants has been carried out. A full discussion of the need for frequent and regular monitoring, precise dosage adjustment and compliance was stressed. Side effects of potential bleeding were discussed and Mr. Truman Stevenson was instructed to call [phone] if there are any signs of abnormal bleeding. Mr. Truman Stevenson was instructed to avoid any OTC items containing aspirin or ibuprofen and prior to starting any new OTC products to consult with his physician or pharmacist to ensure no drug interactions are present. Mr. Truman Stevenson was instructed to avoid any major changes in his general diet and to avoid alcohol consumption. .      Mr. Truman Stevenson verbalized his understanding of all instructions and will call the office with any questions, concerns, or signs of abnormal bleeding or blood clot.

## 2023-11-06 DIAGNOSIS — I48.20 CHRONIC ATRIAL FIBRILLATION, UNSPECIFIED (HCC): ICD-10-CM

## 2023-11-06 DIAGNOSIS — I48.91 AF (ATRIAL FIBRILLATION) (HCC): Primary | ICD-10-CM

## 2023-11-06 RX ORDER — DIGOXIN 125 MCG
TABLET ORAL
Qty: 90 TABLET | Refills: 1 | Status: SHIPPED | OUTPATIENT
Start: 2023-11-06

## 2023-11-06 NOTE — TELEPHONE ENCOUNTER
Requested Prescriptions     Pending Prescriptions Disp Refills    digoxin (LANOXIN) 125 MCG tablet 90 tablet 2     Sig: TAKE 1 TABLET ONCE DAILY FOR HEART

## 2023-11-08 ENCOUNTER — ANTI-COAG VISIT (OUTPATIENT)
Age: 78
End: 2023-11-08

## 2023-11-08 DIAGNOSIS — I48.91 AF (ATRIAL FIBRILLATION) (HCC): Primary | ICD-10-CM

## 2023-11-08 LAB — INR BLD: 2.6

## 2023-11-08 NOTE — PROGRESS NOTES
Mr. Vicenta Thorpe is here today for anticoagulation monitoring for the diagnosis of atrial fibrillation. His INR goal is 2.0-3.0 and his current Coumadin dose is 7.5 mg and 10 mg every 2 days. Today's findings include an INR of 2.6     Considering Mr. Burris's past history, todays findings, and per the coumadin policy/protocol, Mr. Mina Chen was instructed to take Coumadin as follows,  resume same dose of coumadin  He was also instructed to come back in 1 weeks for an INR check. A full discussion of the nature of anticoagulants has been carried out. A full discussion of the need for frequent and regular monitoring, precise dosage adjustment and compliance was stressed. Side effects of potential bleeding were discussed and Mr. Mina Chen was instructed to call [phone] if there are any signs of abnormal bleeding. Mr. Mina Chen was instructed to avoid any OTC items containing aspirin or ibuprofen and prior to starting any new OTC products to consult with his physician or pharmacist to ensure no drug interactions are present. Mr. Mina Chen was instructed to avoid any major changes in his general diet and to avoid alcohol consumption. .      Mr. Mina Chen verbalized his understanding of all instructions and will call the office with any questions, concerns, or signs of abnormal bleeding or blood clot.

## 2023-11-15 ENCOUNTER — ANTI-COAG VISIT (OUTPATIENT)
Age: 78
End: 2023-11-15

## 2023-11-15 DIAGNOSIS — I48.91 AF (ATRIAL FIBRILLATION) (HCC): Primary | ICD-10-CM

## 2023-11-15 LAB — INR BLD: 2.6

## 2023-11-15 NOTE — PROGRESS NOTES
Mr. Serene Adkins is here today for anticoagulation monitoring for the diagnosis of atrial fibrillation. His INR goal is 2.0-3.0 and his current Coumadin dose is Alternating 7.5 mg and 10 mg every 2 days. Today's findings include an INR of 2.6     Considering Mr. Burris's past history, todays findings, and per the coumadin policy/protocol, Mr. Mario Boateng was instructed to take Coumadin as follows,  resume current dose of coumadin. He was also instructed to come back in 1 weeks for an INR check. A full discussion of the nature of anticoagulants has been carried out. A full discussion of the need for frequent and regular monitoring, precise dosage adjustment and compliance was stressed. Side effects of potential bleeding were discussed and Mr. Mario Boateng was instructed to call [phone] if there are any signs of abnormal bleeding. Mr. Mario Boateng was instructed to avoid any OTC items containing aspirin or ibuprofen and prior to starting any new OTC products to consult with his physician or pharmacist to ensure no drug interactions are present. Mr. Mario Boateng was instructed to avoid any major changes in his general diet and to avoid alcohol consumption. .      Mr. Mario Boateng verbalized his understanding of all instructions and will call the office with any questions, concerns, or signs of abnormal bleeding or blood clot.

## 2023-11-22 ENCOUNTER — ANTI-COAG VISIT (OUTPATIENT)
Age: 78
End: 2023-11-22

## 2023-11-22 DIAGNOSIS — I48.91 AF (ATRIAL FIBRILLATION) (HCC): Primary | ICD-10-CM

## 2023-11-22 LAB — INR BLD: 3

## 2023-11-22 NOTE — PROGRESS NOTES
Mr. Blayne Lopez is here today for anticoagulation monitoring for the diagnosis of atrial fibrillation. His INR goal is 2.0-3.0 and his current Coumadin dose is alternating 7.5 mg and 10 mg every 2 days. Today's findings include an INR of 3.0     Considering Mr. Burris's past history, todays findings, and per the coumadin policy/protocol, Mr. Yi Gutierrez was instructed to take Coumadin as follows,  resume same dose of coumadin. He was also instructed to come back in 1 weeks for an INR check. A full discussion of the nature of anticoagulants has been carried out. A full discussion of the need for frequent and regular monitoring, precise dosage adjustment and compliance was stressed. Side effects of potential bleeding were discussed and Mr. Yi Gutierrez was instructed to call [phone] if there are any signs of abnormal bleeding. Mr. Yi Gutierrez was instructed to avoid any OTC items containing aspirin or ibuprofen and prior to starting any new OTC products to consult with his physician or pharmacist to ensure no drug interactions are present. Mr. Yi Gutierrez was instructed to avoid any major changes in his general diet and to avoid alcohol consumption. .      Mr. Yi Gutierrez verbalized his understanding of all instructions and will call the office with any questions, concerns, or signs of abnormal bleeding or blood clot.

## 2023-11-29 ENCOUNTER — ANTI-COAG VISIT (OUTPATIENT)
Age: 78
End: 2023-11-29

## 2023-11-29 DIAGNOSIS — I48.91 AF (ATRIAL FIBRILLATION) (HCC): Primary | ICD-10-CM

## 2023-11-29 LAB — INR BLD: 2.1

## 2023-11-29 NOTE — PROGRESS NOTES
Mr. Julia Patel is here today for anticoagulation monitoring for the diagnosis of atrial fibrillation. His INR goal is 2.0-3.0 and his current Coumadin dose is alternating 7.5 mg and 10 mg every 2 days. .     Today's findings include an INR of 2.1     Considering Mr. Burris's past history, todays findings, and per the coumadin policy/protocol, Mr. Christel Gleason was instructed to take Coumadin as follows,  resume current dose of coumadin. He was also instructed to come back in 1 weeks for an INR check. A full discussion of the nature of anticoagulants has been carried out. A full discussion of the need for frequent and regular monitoring, precise dosage adjustment and compliance was stressed. Side effects of potential bleeding were discussed and Mr. Christel Gleason was instructed to call [phone] if there are any signs of abnormal bleeding. Mr. Christel Gleason was instructed to avoid any OTC items containing aspirin or ibuprofen and prior to starting any new OTC products to consult with his physician or pharmacist to ensure no drug interactions are present. Mr. Christel Gleason was instructed to avoid any major changes in his general diet and to avoid alcohol consumption. .      Mr. Christel Gleason verbalized his understanding of all instructions and will call the office with any questions, concerns, or signs of abnormal bleeding or blood clot.

## 2023-12-05 RX ORDER — METOPROLOL SUCCINATE 25 MG/1
25 TABLET, EXTENDED RELEASE ORAL 2 TIMES DAILY
Qty: 180 TABLET | Refills: 0 | Status: SHIPPED | OUTPATIENT
Start: 2023-12-05

## 2023-12-06 ENCOUNTER — ANTI-COAG VISIT (OUTPATIENT)
Age: 78
End: 2023-12-06

## 2023-12-06 DIAGNOSIS — I48.91 AF (ATRIAL FIBRILLATION) (HCC): Primary | ICD-10-CM

## 2023-12-06 LAB — INR BLD: 1.9

## 2023-12-06 NOTE — PROGRESS NOTES
Mr. Zhang Ruvalcaba is here today for anticoagulation monitoring for the diagnosis of atrial fibrillation. His INR goal is 2.0-3.0 and his current Coumadin dose is alternating 10 mg and 7.5 mg every 2 days. Today's findings include an INR of 1.9     Considering Mr. Burris's past history, todays findings, and per the coumadin policy/protocol, Mr. Curt Bernard was instructed to take Coumadin as follows,  take 10 mg tonight then resume same dose of coumadin. He was also instructed to come back in 1 weeks for an INR check. A full discussion of the nature of anticoagulants has been carried out. A full discussion of the need for frequent and regular monitoring, precise dosage adjustment and compliance was stressed. Side effects of potential bleeding were discussed and Mr. Curt Bernard was instructed to call [phone] if there are any signs of abnormal bleeding. Mr. Curt Bernard was instructed to avoid any OTC items containing aspirin or ibuprofen and prior to starting any new OTC products to consult with his physician or pharmacist to ensure no drug interactions are present. Mr. Curt Bernard was instructed to avoid any major changes in his general diet and to avoid alcohol consumption. .      Mr. Curt Bernard verbalized his understanding of all instructions and will call the office with any questions, concerns, or signs of abnormal bleeding or blood clot.

## 2023-12-13 ENCOUNTER — ANTI-COAG VISIT (OUTPATIENT)
Age: 78
End: 2023-12-13

## 2023-12-13 DIAGNOSIS — I48.91 AF (ATRIAL FIBRILLATION) (HCC): Primary | ICD-10-CM

## 2023-12-13 DIAGNOSIS — I48.20 CHRONIC ATRIAL FIBRILLATION, UNSPECIFIED (HCC): ICD-10-CM

## 2023-12-13 LAB — INR BLD: 1.9

## 2023-12-13 RX ORDER — WARFARIN SODIUM 5 MG/1
TABLET ORAL
Qty: 120 TABLET | Refills: 3 | Status: SHIPPED | OUTPATIENT
Start: 2023-12-13

## 2023-12-13 NOTE — PROGRESS NOTES
Mr. Sergio Mejia is here today for anticoagulation monitoring for the diagnosis of atrial fibrillation. His INR goal is 2.0-3.0 and his current Coumadin dose is alternating 7.5 mg and 10 mg every 2 days. Today's findings include an INR of 1.9     Considering Mr. Burris's past history, todays findings, and per the coumadin policy/protocol, Mr. Lisa Jon was instructed to take Coumadin as follows,  take 10 mg daily . He was also instructed to come back in 1 weeks for an INR check. A full discussion of the nature of anticoagulants has been carried out. A full discussion of the need for frequent and regular monitoring, precise dosage adjustment and compliance was stressed. Side effects of potential bleeding were discussed and Mr. Lisa Jon was instructed to call [phone] if there are any signs of abnormal bleeding. Mr. Lisa Jon was instructed to avoid any OTC items containing aspirin or ibuprofen and prior to starting any new OTC products to consult with his physician or pharmacist to ensure no drug interactions are present. Mr. Lisa Jon was instructed to avoid any major changes in his general diet and to avoid alcohol consumption. .      Mr. Lisa Jon verbalized his understanding of all instructions and will call the office with any questions, concerns, or signs of abnormal bleeding or blood clot.

## 2023-12-13 NOTE — TELEPHONE ENCOUNTER
Requested Prescriptions     Pending Prescriptions Disp Refills    warfarin (COUMADIN) 5 MG tablet 120 tablet 3     Sig: Take 2 tablets by mouth daily as prescribed by physician.

## 2023-12-27 ENCOUNTER — ANTI-COAG VISIT (OUTPATIENT)
Age: 78
End: 2023-12-27

## 2023-12-27 DIAGNOSIS — I48.91 AF (ATRIAL FIBRILLATION) (HCC): Primary | ICD-10-CM

## 2023-12-27 LAB — INR BLD: 3.7

## 2023-12-27 NOTE — PROGRESS NOTES
Mr. Nina Martinez is here today for anticoagulation monitoring for the diagnosis of atrial fibrillation. His INR goal is 2.0-3.0 and his current Coumadin dose is 10 mg daily. Today's findings include an INR of 3.7     Considering Mr. Burris's past history, todays findings, and per the coumadin policy/protocol, Mr. Martín Aguiar was instructed to take Coumadin as follows,  hold tonight then resume 10 mg daily. He was also instructed to come back in 1 weeks for an INR check. A full discussion of the nature of anticoagulants has been carried out. A full discussion of the need for frequent and regular monitoring, precise dosage adjustment and compliance was stressed. Side effects of potential bleeding were discussed and Mr. Martín Aguiar was instructed to call [phone] if there are any signs of abnormal bleeding. Mr. Martín Aguiar was instructed to avoid any OTC items containing aspirin or ibuprofen and prior to starting any new OTC products to consult with his physician or pharmacist to ensure no drug interactions are present. Mr. Martín Aguiar was instructed to avoid any major changes in his general diet and to avoid alcohol consumption. .      Mr. Martín Aguiar verbalized his understanding of all instructions and will call the office with any questions, concerns, or signs of abnormal bleeding or blood clot.

## 2024-01-03 ENCOUNTER — ANTI-COAG VISIT (OUTPATIENT)
Age: 79
End: 2024-01-03

## 2024-01-03 DIAGNOSIS — I48.91 AF (ATRIAL FIBRILLATION) (HCC): Primary | ICD-10-CM

## 2024-01-03 LAB — INR BLD: 3.2

## 2024-01-03 NOTE — PROGRESS NOTES
Mr. Ean Burris is here today for anticoagulation monitoring for the diagnosis of atrial fibrillation.  His INR goal is 2.0-3.0 and his current Coumadin dose is held 1 night then 10 mg daily.     Today's findings include an INR of 3.2     Considering Mr. Burris's past history, todays findings, and per the coumadin policy/protocol, Mr. Burris was instructed to take Coumadin as follows,  hold tonight then start back taking 5 mgs on Thur/Sun and 10 mgs all other days.  He was also instructed to come back in 1 weeks for an INR check.    A full discussion of the nature of anticoagulants has been carried out.  A full discussion of the need for frequent and regular monitoring, precise dosage adjustment and compliance was stressed.  Side effects of potential bleeding were discussed and Mr. Burris was instructed to call [phone] if there are any signs of abnormal bleeding.  Mr. Burris was instructed to avoid any OTC items containing aspirin or ibuprofen and prior to starting any new OTC products to consult with his physician or pharmacist to ensure no drug interactions are present.  Mr. Burris was instructed to avoid any major changes in his general diet and to avoid alcohol consumption.  .      Mr. Burris verbalized his understanding of all instructions and will call the office with any questions, concerns, or signs of abnormal bleeding or blood clot.

## 2024-01-09 ENCOUNTER — OFFICE VISIT (OUTPATIENT)
Age: 79
End: 2024-01-09
Payer: MEDICARE

## 2024-01-09 VITALS
BODY MASS INDEX: 21.28 KG/M2 | DIASTOLIC BLOOD PRESSURE: 75 MMHG | WEIGHT: 152 LBS | OXYGEN SATURATION: 98 % | SYSTOLIC BLOOD PRESSURE: 139 MMHG | HEART RATE: 100 BPM | HEIGHT: 71 IN

## 2024-01-09 DIAGNOSIS — Z79.01 LONG TERM (CURRENT) USE OF ANTICOAGULANTS: ICD-10-CM

## 2024-01-09 DIAGNOSIS — I11.9 HYPERTENSIVE HEART DISEASE WITHOUT HEART FAILURE: ICD-10-CM

## 2024-01-09 DIAGNOSIS — I48.20 CHRONIC ATRIAL FIBRILLATION, UNSPECIFIED (HCC): Primary | ICD-10-CM

## 2024-01-09 DIAGNOSIS — I34.0 NONRHEUMATIC MITRAL (VALVE) INSUFFICIENCY: ICD-10-CM

## 2024-01-09 PROCEDURE — 1123F ACP DISCUSS/DSCN MKR DOCD: CPT | Performed by: INTERNAL MEDICINE

## 2024-01-09 PROCEDURE — 99214 OFFICE O/P EST MOD 30 MIN: CPT | Performed by: INTERNAL MEDICINE

## 2024-01-09 PROCEDURE — 1036F TOBACCO NON-USER: CPT | Performed by: INTERNAL MEDICINE

## 2024-01-09 PROCEDURE — G8428 CUR MEDS NOT DOCUMENT: HCPCS | Performed by: INTERNAL MEDICINE

## 2024-01-09 PROCEDURE — G8484 FLU IMMUNIZE NO ADMIN: HCPCS | Performed by: INTERNAL MEDICINE

## 2024-01-09 PROCEDURE — G8420 CALC BMI NORM PARAMETERS: HCPCS | Performed by: INTERNAL MEDICINE

## 2024-01-09 ASSESSMENT — PATIENT HEALTH QUESTIONNAIRE - PHQ9
SUM OF ALL RESPONSES TO PHQ QUESTIONS 1-9: 0
DEPRESSION UNABLE TO ASSESS: FUNCTIONAL CAPACITY MOTIVATION LIMITS ACCURACY
SUM OF ALL RESPONSES TO PHQ QUESTIONS 1-9: 0
1. LITTLE INTEREST OR PLEASURE IN DOING THINGS: 0
SUM OF ALL RESPONSES TO PHQ QUESTIONS 1-9: 0
SUM OF ALL RESPONSES TO PHQ QUESTIONS 1-9: 0
SUM OF ALL RESPONSES TO PHQ9 QUESTIONS 1 & 2: 0
2. FEELING DOWN, DEPRESSED OR HOPELESS: 0

## 2024-01-09 NOTE — PROGRESS NOTES
1. Have you been to the ER, urgent care clinic since your last visit?  Hospitalized since your last visit?     No    2. Have you seen or consulted any other health care providers outside of the [facility] since your last visit?  Include any pap smears or colon screening.      No      
  Cardiovascular:      Rate and Rhythm: Normal rate. Rhythm irregular.      Heart sounds: Murmur (3/6 holosystolic murmur best heard at mitral area with no radiation) heard.   Crescendo decrescendo systolic murmur is present with a grade of 2/6.     No gallop.   Pulmonary:      Effort: Pulmonary effort is normal.      Breath sounds: Normal breath sounds. No stridor. No wheezing or rales.   Abdominal:      Palpations: Abdomen is soft.      Tenderness: There is no abdominal tenderness. There is no rebound.   Musculoskeletal:         General: No tenderness. Normal range of motion.      Cervical back: Neck supple.   Skin:     General: Skin is warm.   Neurological:      Mental Status: He is alert and oriented to person, place, and time.      Motor: No abnormal muscle tone.   Psychiatric:         Behavior: Behavior normal.     ekg atrial fibrillaton with controlled ventricular response.  04/09/19   ECHO ADULT COMPLETE 04/11/2019 4/11/2019    Narrative · Estimated left ventricular ejection fraction is 61 - 65%. No regional   wall motion abnormality noted. Moderate (grade 2) left ventricular   diastolic dysfunction.  · Left atrial cavity size is moderately dilated.  · Right ventricular cavity size is mildly dilated.  · Right atrial cavity size is moderately dilated.  · Aortic valve leaflet calcification present with reduced excursion. Mild   aortic valve stenosis is present. Mild aortic valve regurgitation is   present.  · Mitral valve thickening. Mild to moderate mitral valve regurgitation.  · Mild to moderate tricuspid valve regurgitation is present. There is no   evidence of pulmonary hypertension.  · Mild pulmonic valve regurgitation is present.  · Mild aortic root (3.9 cm) and ascending aorta (4.2 cm) dilatation.  · Mildly elevated central venous pressure (5-10 mmHg); IVC diameter is   less than 21 mm and collapses less than 50% with respiration.        Signed by: George Snow MD     10/12/20   ECHO ADULT

## 2024-01-10 ENCOUNTER — ANTI-COAG VISIT (OUTPATIENT)
Age: 79
End: 2024-01-10

## 2024-01-10 DIAGNOSIS — I48.91 AF (ATRIAL FIBRILLATION) (HCC): Primary | ICD-10-CM

## 2024-01-10 LAB — INR BLD: 2.1

## 2024-01-10 NOTE — PROGRESS NOTES
Mr. Ean Burris is here today for anticoagulation monitoring for the diagnosis of atrial fibrillation.  His INR goal is 2.0-3.0 and his current Coumadin dose is 5 mg every Thursday and Sunday and 10 mg AOD.     Today's findings include an INR of 2.1     Considering Mr. Burris's past history, todays findings, and per the coumadin policy/protocol, Mr. Burris was instructed to take Coumadin as follows,  resume current dose of coumadin.  He was also instructed to come back in 1 weeks for an INR check.    A full discussion of the nature of anticoagulants has been carried out.  A full discussion of the need for frequent and regular monitoring, precise dosage adjustment and compliance was stressed.  Side effects of potential bleeding were discussed and Mr. Burris was instructed to call [phone] if there are any signs of abnormal bleeding.  Mr. Burris was instructed to avoid any OTC items containing aspirin or ibuprofen and prior to starting any new OTC products to consult with his physician or pharmacist to ensure no drug interactions are present.  Mr. Burris was instructed to avoid any major changes in his general diet and to avoid alcohol consumption.  .      Mr. Burris verbalized his understanding of all instructions and will call the office with any questions, concerns, or signs of abnormal bleeding or blood clot.

## 2024-01-17 ENCOUNTER — ANTI-COAG VISIT (OUTPATIENT)
Age: 79
End: 2024-01-17

## 2024-01-17 DIAGNOSIS — I48.91 AF (ATRIAL FIBRILLATION) (HCC): Primary | ICD-10-CM

## 2024-01-17 LAB — INR BLD: 1.8

## 2024-01-17 NOTE — PROGRESS NOTES
Mr. Ean Burris is here today for anticoagulation monitoring for the diagnosis of atrial fibrillation.  His INR goal is 2.0-3.0 and his current Coumadin dose is 5 mg Sunday Thursday 10 mg AOD.     Today's findings include an INR of 1.8     Considering Mr. Burris's past history, todays findings, and per the coumadin policy/protocol, Mr. Burris was instructed to take Coumadin as follows,  resume same dose of coumadin.  He was also instructed to come back in 1 weeks for an INR check.    A full discussion of the nature of anticoagulants has been carried out.  A full discussion of the need for frequent and regular monitoring, precise dosage adjustment and compliance was stressed.  Side effects of potential bleeding were discussed and Mr. Burris was instructed to call [phone] if there are any signs of abnormal bleeding.  Mr. Burris was instructed to avoid any OTC items containing aspirin or ibuprofen and prior to starting any new OTC products to consult with his physician or pharmacist to ensure no drug interactions are present.  Mr. Burris was instructed to avoid any major changes in his general diet and to avoid alcohol consumption.  .      Mr. Burris verbalized his understanding of all instructions and will call the office with any questions, concerns, or signs of abnormal bleeding or blood clot.

## 2024-01-24 ENCOUNTER — ANTI-COAG VISIT (OUTPATIENT)
Age: 79
End: 2024-01-24

## 2024-01-24 DIAGNOSIS — I48.91 AF (ATRIAL FIBRILLATION) (HCC): Primary | ICD-10-CM

## 2024-01-24 LAB — INR BLD: 2.1

## 2024-01-24 NOTE — PROGRESS NOTES
Mr. Ean Burris is here today for anticoagulation monitoring for the diagnosis of atrial fibrillation.  His INR goal is 2.0-3.0 and his current Coumadin dose is 5 mg Sunday and Thursday and 10 mg AOD.     Today's findings include an INR of 2.1     Considering Mr. Burris's past history, todays findings, and per the coumadin policy/protocol, Mr. Burris was instructed to take Coumadin as follows,  resume same dose of coumadin.  He was also instructed to come back in 1 weeks for an INR check.    A full discussion of the nature of anticoagulants has been carried out.  A full discussion of the need for frequent and regular monitoring, precise dosage adjustment and compliance was stressed.  Side effects of potential bleeding were discussed and Mr. Burris was instructed to call [phone] if there are any signs of abnormal bleeding.  Mr. Burris was instructed to avoid any OTC items containing aspirin or ibuprofen and prior to starting any new OTC products to consult with his physician or pharmacist to ensure no drug interactions are present.  Mr. Burris was instructed to avoid any major changes in his general diet and to avoid alcohol consumption.  .      Mr. Burris verbalized his understanding of all instructions and will call the office with any questions, concerns, or signs of abnormal bleeding or blood clot.

## 2024-01-31 ENCOUNTER — ANTI-COAG VISIT (OUTPATIENT)
Age: 79
End: 2024-01-31

## 2024-01-31 DIAGNOSIS — I48.91 AF (ATRIAL FIBRILLATION) (HCC): Primary | ICD-10-CM

## 2024-01-31 LAB — INR BLD: 1.9

## 2024-01-31 NOTE — PROGRESS NOTES
Mr. Ean Burris is here today for anticoagulation monitoring for the diagnosis of atrial fibrillation.  His INR goal is 2.0-3.0 and his current Coumadin dose is 5 mg Thursday and Sunday and 10 mg AOD.     Today's findings include an INR of 1.9     Considering Mr. Burris's past history, todays findings, and per the coumadin policy/protocol, Mr. Burris was instructed to take Coumadin as follows,  take 15 mg tonight then resume current dose of coumadin.  He was also instructed to come back in 1 weeks for an INR check.    A full discussion of the nature of anticoagulants has been carried out.  A full discussion of the need for frequent and regular monitoring, precise dosage adjustment and compliance was stressed.  Side effects of potential bleeding were discussed and Mr. Burris was instructed to call [phone] if there are any signs of abnormal bleeding.  Mr. Burris was instructed to avoid any OTC items containing aspirin or ibuprofen and prior to starting any new OTC products to consult with his physician or pharmacist to ensure no drug interactions are present.  Mr. Burris was instructed to avoid any major changes in his general diet and to avoid alcohol consumption.  .      Mr. Burris verbalized his understanding of all instructions and will call the office with any questions, concerns, or signs of abnormal bleeding or blood clot.

## 2024-02-07 ENCOUNTER — ANTI-COAG VISIT (OUTPATIENT)
Age: 79
End: 2024-02-07

## 2024-02-07 DIAGNOSIS — I48.91 AF (ATRIAL FIBRILLATION) (HCC): Primary | ICD-10-CM

## 2024-02-07 LAB — INR BLD: 1.7

## 2024-02-07 NOTE — PROGRESS NOTES
Mr. Ean Burris is here today for anticoagulation monitoring for the diagnosis of atrial fibrillation.  His INR goal is 2.0-3.0 and his current Coumadin dose is 5 mg every Sunday and Thursday and 10 mg AOD.     Today's findings include an INR of 1.7     Considering Mr. Burris's past history, todays findings, and per the coumadin policy/protocol, Mr. Burris was instructed to take Coumadin as follows,  resume same dose of coumadin.  He was also instructed to come back in 1 weeks for an INR check.    A full discussion of the nature of anticoagulants has been carried out.  A full discussion of the need for frequent and regular monitoring, precise dosage adjustment and compliance was stressed.  Side effects of potential bleeding were discussed and Mr. Burris was instructed to call [phone] if there are any signs of abnormal bleeding.  Mr. Burris was instructed to avoid any OTC items containing aspirin or ibuprofen and prior to starting any new OTC products to consult with his physician or pharmacist to ensure no drug interactions are present.  Mr. Burris was instructed to avoid any major changes in his general diet and to avoid alcohol consumption.  .      Mr. Burris verbalized his understanding of all instructions and will call the office with any questions, concerns, or signs of abnormal bleeding or blood clot.

## 2024-02-14 ENCOUNTER — ANTI-COAG VISIT (OUTPATIENT)
Age: 79
End: 2024-02-14

## 2024-02-14 DIAGNOSIS — I48.91 AF (ATRIAL FIBRILLATION) (HCC): Primary | ICD-10-CM

## 2024-02-14 LAB — INR BLD: 2.3

## 2024-02-14 NOTE — PROGRESS NOTES
Mr. Ean Burris is here today for anticoagulation monitoring for the diagnosis of atrial fibrillation.  His INR goal is 2.0-3.0 and his current Coumadin dose is 10 mg every day except Thursday and Sunday 5 mg.     Today's findings include an INR of 2.3     Considering Mr. Burris's past history, todays findings, and per the coumadin policy/protocol, Mr. Burris was instructed to take Coumadin as follows,  resume current dose of coumadin.  He was also instructed to come back in 1 weeks for an INR check.    A full discussion of the nature of anticoagulants has been carried out.  A full discussion of the need for frequent and regular monitoring, precise dosage adjustment and compliance was stressed.  Side effects of potential bleeding were discussed and Mr. Burris was instructed to call [phone] if there are any signs of abnormal bleeding.  Mr. Burris was instructed to avoid any OTC items containing aspirin or ibuprofen and prior to starting any new OTC products to consult with his physician or pharmacist to ensure no drug interactions are present.  Mr. Burris was instructed to avoid any major changes in his general diet and to avoid alcohol consumption.  .      Mr. Burris verbalized his understanding of all instructions and will call the office with any questions, concerns, or signs of abnormal bleeding or blood clot.

## 2024-02-21 ENCOUNTER — ANTI-COAG VISIT (OUTPATIENT)
Age: 79
End: 2024-02-21

## 2024-02-21 DIAGNOSIS — I48.91 AF (ATRIAL FIBRILLATION) (HCC): Primary | ICD-10-CM

## 2024-02-21 LAB — INR BLD: 2.1

## 2024-02-21 NOTE — PROGRESS NOTES
Mr. Ean Burris is here today for anticoagulation monitoring for the diagnosis of atrial fibrillation.  His INR goal is 2.0-3.0 and his current Coumadin dose is 10 mg every day except for Thursday and Sunday 5 mg.     Today's findings include an INR of 2.1     Considering Mr. Burris's past history, todays findings, and per the coumadin policy/protocol, Mr. Burris was instructed to take Coumadin as follows,  resume current dose of coumadin.  He was also instructed to come back in 1 weeks for an INR check.    A full discussion of the nature of anticoagulants has been carried out.  A full discussion of the need for frequent and regular monitoring, precise dosage adjustment and compliance was stressed.  Side effects of potential bleeding were discussed and Mr. Burris was instructed to call [phone] if there are any signs of abnormal bleeding.  Mr. Burris was instructed to avoid any OTC items containing aspirin or ibuprofen and prior to starting any new OTC products to consult with his physician or pharmacist to ensure no drug interactions are present.  Mr. Burris was instructed to avoid any major changes in his general diet and to avoid alcohol consumption.  .      Mr. Burris verbalized his understanding of all instructions and will call the office with any questions, concerns, or signs of abnormal bleeding or blood clot.

## 2024-02-28 ENCOUNTER — ANTI-COAG VISIT (OUTPATIENT)
Age: 79
End: 2024-02-28

## 2024-02-28 DIAGNOSIS — I48.91 AF (ATRIAL FIBRILLATION) (HCC): Primary | ICD-10-CM

## 2024-02-28 LAB — INR BLD: 2.1

## 2024-02-28 NOTE — PROGRESS NOTES
Mr. Ean Burris is here today for anticoagulation monitoring for the diagnosis of atrial fibrillation.  His INR goal is 2.0-3.0 and his current Coumadin dose is 10 mg everyday except Thursday and Sunday 5 mg.     Today's findings include an INR of 2.1     Considering Mr. Burris's past history, todays findings, and per the coumadin policy/protocol, Mr. Burris was instructed to take Coumadin as follows,  resume current dose of coumadin.  He was also instructed to come back in 1 weeks for an INR check.    A full discussion of the nature of anticoagulants has been carried out.  A full discussion of the need for frequent and regular monitoring, precise dosage adjustment and compliance was stressed.  Side effects of potential bleeding were discussed and Mr. Burris was instructed to call [phone] if there are any signs of abnormal bleeding.  Mr. Burris was instructed to avoid any OTC items containing aspirin or ibuprofen and prior to starting any new OTC products to consult with his physician or pharmacist to ensure no drug interactions are present.  Mr. Burris was instructed to avoid any major changes in his general diet and to avoid alcohol consumption.  .      Mr. Burris verbalized his understanding of all instructions and will call the office with any questions, concerns, or signs of abnormal bleeding or blood clot.

## 2024-03-06 ENCOUNTER — ANTI-COAG VISIT (OUTPATIENT)
Age: 79
End: 2024-03-06

## 2024-03-06 DIAGNOSIS — I48.91 AF (ATRIAL FIBRILLATION) (HCC): Primary | ICD-10-CM

## 2024-03-06 LAB — INR BLD: 2.1

## 2024-03-06 NOTE — PROGRESS NOTES
Mr. Ean Burris is here today for anticoagulation monitoring for the diagnosis of atrial fibrillation.  His INR goal is 2.0-3.0 and his current Coumadin dose is 5 mg every Thursday and Sunday and 10 mg AOD.     Today's findings include an INR of 2.1     Considering Mr. Burris's past history, todays findings, and per the coumadin policy/protocol, Mr. Burris was instructed to take Coumadin as follows,  resume current dose of coumadin.  He was also instructed to come back in 1 weeks for an INR check.    A full discussion of the nature of anticoagulants has been carried out.  A full discussion of the need for frequent and regular monitoring, precise dosage adjustment and compliance was stressed.  Side effects of potential bleeding were discussed and Mr. Burris was instructed to call [phone] if there are any signs of abnormal bleeding.  Mr. Burris was instructed to avoid any OTC items containing aspirin or ibuprofen and prior to starting any new OTC products to consult with his physician or pharmacist to ensure no drug interactions are present.  Mr. Burris was instructed to avoid any major changes in his general diet and to avoid alcohol consumption.  .      Mr. Burris verbalized his understanding of all instructions and will call the office with any questions, concerns, or signs of abnormal bleeding or blood clot.

## 2024-03-13 ENCOUNTER — ANTI-COAG VISIT (OUTPATIENT)
Age: 79
End: 2024-03-13

## 2024-03-13 DIAGNOSIS — I48.91 AF (ATRIAL FIBRILLATION) (HCC): Primary | ICD-10-CM

## 2024-03-13 LAB — INR BLD: 2.3

## 2024-03-13 NOTE — PROGRESS NOTES
Mr. Ean Burris is here today for anticoagulation monitoring for the diagnosis of atrial fibrillation.  His INR goal is 2.0-3.0 and his current Coumadin dose is 7.5 mg & 10 mg .     Today's findings include an INR of 2.3     Considering Mr. Burris's past history, todays findings, and per the coumadin policy/protocol, Mr. Burris was instructed to take Coumadin as follows,  5 mg every Sun, Thu; 10 mg all other days.  He was also instructed to come back in 1 weeks for an INR check.    A full discussion of the nature of anticoagulants has been carried out.  A full discussion of the need for frequent and regular monitoring, precise dosage adjustment and compliance was stressed.  Side effects of potential bleeding were discussed and Mr. Burris was instructed to call [phone] if there are any signs of abnormal bleeding.  Mr. Burris was instructed to avoid any OTC items containing aspirin or ibuprofen and prior to starting any new OTC products to consult with his physician or pharmacist to ensure no drug interactions are present.  Mr. Burris was instructed to avoid any major changes in his general diet and to avoid alcohol consumption.  .      Mr. Burris verbalized his understanding of all instructions and will call the office with any questions, concerns, or signs of abnormal bleeding or blood clot.

## 2024-03-20 ENCOUNTER — ANTI-COAG VISIT (OUTPATIENT)
Age: 79
End: 2024-03-20

## 2024-03-20 DIAGNOSIS — I48.91 AF (ATRIAL FIBRILLATION) (HCC): Primary | ICD-10-CM

## 2024-03-20 LAB — INR BLD: 1.8

## 2024-03-20 NOTE — PROGRESS NOTES
Mr. Ean Burris is here today for anticoagulation monitoring for the diagnosis of atrial fibrillation.  His INR goal is 2.0-3.0 and his current Coumadin dose is 10 mg every day except Thursday and Sunday 5 mg.     Today's findings include an INR of 1.8     Considering Mr. Burris's past history, todays findings, and per the coumadin policy/protocol, Mr. Burris was instructed to take Coumadin as follows,  take 10 mg daily except on Sunday 5 mg.  He was also instructed to come back in 1 weeks for an INR check.    A full discussion of the nature of anticoagulants has been carried out.  A full discussion of the need for frequent and regular monitoring, precise dosage adjustment and compliance was stressed.  Side effects of potential bleeding were discussed and Mr. Burris was instructed to call [phone] if there are any signs of abnormal bleeding.  Mr. Burris was instructed to avoid any OTC items containing aspirin or ibuprofen and prior to starting any new OTC products to consult with his physician or pharmacist to ensure no drug interactions are present.  Mr. Burris was instructed to avoid any major changes in his general diet and to avoid alcohol consumption.  .      Mr. Burris verbalized his understanding of all instructions and will call the office with any questions, concerns, or signs of abnormal bleeding or blood clot.

## 2024-03-27 ENCOUNTER — ANTI-COAG VISIT (OUTPATIENT)
Age: 79
End: 2024-03-27

## 2024-03-27 DIAGNOSIS — I48.91 AF (ATRIAL FIBRILLATION) (HCC): Primary | ICD-10-CM

## 2024-03-27 LAB — INR BLD: 2.2

## 2024-03-27 NOTE — PROGRESS NOTES
Mr. Ean Burris is here today for anticoagulation monitoring for the diagnosis of atrial fibrillation.  His INR goal is 2.0-3.0 and his current Coumadin dose is 5 mg every Thursday and Sunday and 10 mg AOD.     Today's findings include an INR of 2.2     Considering Mr. Burris's past history, todays findings, and per the coumadin policy/protocol, Mr. Burris was instructed to take Coumadin as follows,  resume current dose of coumadin.  He was also instructed to come back in 1 weeks for an INR check.    A full discussion of the nature of anticoagulants has been carried out.  A full discussion of the need for frequent and regular monitoring, precise dosage adjustment and compliance was stressed.  Side effects of potential bleeding were discussed and Mr. Burris was instructed to call [phone] if there are any signs of abnormal bleeding.  Mr. Burris was instructed to avoid any OTC items containing aspirin or ibuprofen and prior to starting any new OTC products to consult with his physician or pharmacist to ensure no drug interactions are present.  Mr. Burris was instructed to avoid any major changes in his general diet and to avoid alcohol consumption.  .      Mr. Burris verbalized his understanding of all instructions and will call the office with any questions, concerns, or signs of abnormal bleeding or blood clot.

## 2024-04-03 ENCOUNTER — ANTI-COAG VISIT (OUTPATIENT)
Age: 79
End: 2024-04-03

## 2024-04-03 DIAGNOSIS — I48.91 AF (ATRIAL FIBRILLATION) (HCC): Primary | ICD-10-CM

## 2024-04-03 LAB — INR BLD: 2.2

## 2024-04-03 NOTE — PROGRESS NOTES
Mr. Ean Burris is here today for anticoagulation monitoring for the diagnosis of atrial fibrillation.  His INR goal is 2.0-3.0 and his current Coumadin dose is 5 mg on Sun/Thur and 10 mg all other days.     Today's findings include an INR of 2.2     Considering Mr. Burris's past history, todays findings, and per the coumadin policy/protocol, Mr. Burris was instructed to take Coumadin as follows,  continue taking 5 mg on Sun/Thur and 10 mgs all other days.  He was also instructed to come back in 1 weeks for an INR check.    A full discussion of the nature of anticoagulants has been carried out.  A full discussion of the need for frequent and regular monitoring, precise dosage adjustment and compliance was stressed.  Side effects of potential bleeding were discussed and Mr. Burris was instructed to call [phone] if there are any signs of abnormal bleeding.  Mr. Burris was instructed to avoid any OTC items containing aspirin or ibuprofen and prior to starting any new OTC products to consult with his physician or pharmacist to ensure no drug interactions are present.  Mr. Burris was instructed to avoid any major changes in his general diet and to avoid alcohol consumption.  .      Mr. Burris verbalized his understanding of all instructions and will call the office with any questions, concerns, or signs of abnormal bleeding or blood clot.

## 2024-04-10 ENCOUNTER — ANTI-COAG VISIT (OUTPATIENT)
Age: 79
End: 2024-04-10

## 2024-04-10 DIAGNOSIS — I48.91 AF (ATRIAL FIBRILLATION) (HCC): Primary | ICD-10-CM

## 2024-04-10 LAB — INR BLD: 1.9

## 2024-04-10 NOTE — PROGRESS NOTES
Mr. Ean Burris is here today for anticoagulation monitoring for the diagnosis of atrial fibrillation.  His INR goal is 2.0-3.0 and his current Coumadin dose is 5 mg every Sunday and Thursday and 10 AOD.     Today's findings include an INR of 1.9     Considering Mr. Burris's past history, todays findings, and per the coumadin policy/protocol, Mr. Burris was instructed to take Coumadin as follows,  resume current dose of coumadin.  He was also instructed to come back in 1 weeks for an INR check.    A full discussion of the nature of anticoagulants has been carried out.  A full discussion of the need for frequent and regular monitoring, precise dosage adjustment and compliance was stressed.  Side effects of potential bleeding were discussed and Mr. Burris was instructed to call [phone] if there are any signs of abnormal bleeding.  Mr. Burris was instructed to avoid any OTC items containing aspirin or ibuprofen and prior to starting any new OTC products to consult with his physician or pharmacist to ensure no drug interactions are present.  Mr. Burris was instructed to avoid any major changes in his general diet and to avoid alcohol consumption.  .      Mr. Burris verbalized his understanding of all instructions and will call the office with any questions, concerns, or signs of abnormal bleeding or blood clot.

## 2024-04-17 ENCOUNTER — ANTI-COAG VISIT (OUTPATIENT)
Age: 79
End: 2024-04-17

## 2024-04-17 DIAGNOSIS — I48.91 AF (ATRIAL FIBRILLATION) (HCC): Primary | ICD-10-CM

## 2024-04-17 LAB — INR BLD: 2.1

## 2024-04-17 NOTE — PROGRESS NOTES
Mr. Ean Burris is here today for anticoagulation monitoring for the diagnosis of atrial fibrillation.  His INR goal is 2.0-3.0 and his current Coumadin dose is 5 mg every Sun/Thur and 10 mgs all other days.     Today's findings include an INR of 2.1      Considering Mr. Burris's past history, todays findings, and per the coumadin policy/protocol, Mr. Burris was instructed to take Coumadin as follows,  continue taking 5 mg every Sun/Thur and 10 mgs all other days.  He was also instructed to come back in 1 weeks for an INR check.    A full discussion of the nature of anticoagulants has been carried out.  A full discussion of the need for frequent and regular monitoring, precise dosage adjustment and compliance was stressed.  Side effects of potential bleeding were discussed and Mr. Burris was instructed to call [phone] if there are any signs of abnormal bleeding.  Mr. Burris was instructed to avoid any OTC items containing aspirin or ibuprofen and prior to starting any new OTC products to consult with his physician or pharmacist to ensure no drug interactions are present.  Mr. Burris was instructed to avoid any major changes in his general diet and to avoid alcohol consumption.  .      Mr. Burris verbalized his understanding of all instructions and will call the office with any questions, concerns, or signs of abnormal bleeding or blood clot.

## 2024-04-24 ENCOUNTER — ANTI-COAG VISIT (OUTPATIENT)
Age: 79
End: 2024-04-24

## 2024-04-24 DIAGNOSIS — I48.91 AF (ATRIAL FIBRILLATION) (HCC): Primary | ICD-10-CM

## 2024-04-24 LAB — INR BLD: 2.1

## 2024-04-24 NOTE — PROGRESS NOTES
Mr. Ean Burris is here today for anticoagulation monitoring for the diagnosis of atrial fibrillation.  His INR goal is 2.0-3.0 and his current Coumadin dose is 5 mg every Sunday and Thursday and 10 mg AOD.     Today's findings include an INR of 2.1     Considering Mr. Burris's past history, todays findings, and per the coumadin policy/protocol, Mr. Burris was instructed to take Coumadin as follows,  resume same dose of coumadin.  He was also instructed to come back in 1 weeks for an INR check.    A full discussion of the nature of anticoagulants has been carried out.  A full discussion of the need for frequent and regular monitoring, precise dosage adjustment and compliance was stressed.  Side effects of potential bleeding were discussed and Mr. Burris was instructed to call [phone] if there are any signs of abnormal bleeding.  Mr. Burirs was instructed to avoid any OTC items containing aspirin or ibuprofen and prior to starting any new OTC products to consult with his physician or pharmacist to ensure no drug interactions are present.  Mr. Burris was instructed to avoid any major changes in his general diet and to avoid alcohol consumption.  .      Mr. Burris verbalized his understanding of all instructions and will call the office with any questions, concerns, or signs of abnormal bleeding or blood clot.

## 2024-05-01 ENCOUNTER — ANTI-COAG VISIT (OUTPATIENT)
Age: 79
End: 2024-05-01

## 2024-05-01 DIAGNOSIS — I48.91 AF (ATRIAL FIBRILLATION) (HCC): Primary | ICD-10-CM

## 2024-05-01 LAB — INR BLD: 2.1

## 2024-05-01 NOTE — PROGRESS NOTES
Mr. Ean Burris is here today for anticoagulation monitoring for the diagnosis of atrial fibrillation.  His INR goal is 2.0-3.0 and his current Coumadin dose is 5 mg Thursday and Sunday and 10 mg AOD.     Today's findings include an INR of 2.1     Considering Mr. Burris's past history, todays findings, and per the coumadin policy/protocol, Mr. Burris was instructed to take Coumadin as follows,  resume same dose of coumadin.  He was also instructed to come back in 1 weeks for an INR check.    A full discussion of the nature of anticoagulants has been carried out.  A full discussion of the need for frequent and regular monitoring, precise dosage adjustment and compliance was stressed.  Side effects of potential bleeding were discussed and Mr. Burris was instructed to call [phone] if there are any signs of abnormal bleeding.  Mr. Burris was instructed to avoid any OTC items containing aspirin or ibuprofen and prior to starting any new OTC products to consult with his physician or pharmacist to ensure no drug interactions are present.  Mr. Burris was instructed to avoid any major changes in his general diet and to avoid alcohol consumption.  .      Mr. Burris verbalized his understanding of all instructions and will call the office with any questions, concerns, or signs of abnormal bleeding or blood clot.

## 2024-05-08 ENCOUNTER — ANTI-COAG VISIT (OUTPATIENT)
Age: 79
End: 2024-05-08

## 2024-05-08 DIAGNOSIS — I48.91 AF (ATRIAL FIBRILLATION) (HCC): Primary | ICD-10-CM

## 2024-05-08 LAB — INR BLD: 1.8

## 2024-05-08 NOTE — PROGRESS NOTES
Mr. Ean Burris is here today for anticoagulation monitoring for the diagnosis of atrial fibrillation.  His INR goal is 2.0-3.0 and his current Coumadin dose is 5 mg every Thursday and Sunday 10 mg AOD.     Today's findings include an INR of 1.8     Considering Mr. Burris's past history, todays findings, and per the coumadin policy/protocol, Mr. Burris was instructed to take Coumadin as follows,  take 15 mg tonight then resume current dose of coumadin.  He was also instructed to come back in 1 weeks for an INR check.    A full discussion of the nature of anticoagulants has been carried out.  A full discussion of the need for frequent and regular monitoring, precise dosage adjustment and compliance was stressed.  Side effects of potential bleeding were discussed and Mr. Burris was instructed to call [phone] if there are any signs of abnormal bleeding.  Mr. Burris was instructed to avoid any OTC items containing aspirin or ibuprofen and prior to starting any new OTC products to consult with his physician or pharmacist to ensure no drug interactions are present.  Mr. Burris was instructed to avoid any major changes in his general diet and to avoid alcohol consumption.  .      Mr. Burris verbalized his understanding of all instructions and will call the office with any questions, concerns, or signs of abnormal bleeding or blood clot.

## 2024-05-15 ENCOUNTER — ANTI-COAG VISIT (OUTPATIENT)
Age: 79
End: 2024-05-15

## 2024-05-15 DIAGNOSIS — I48.91 AF (ATRIAL FIBRILLATION) (HCC): Primary | ICD-10-CM

## 2024-05-15 LAB — INR BLD: 2.5

## 2024-05-15 NOTE — PROGRESS NOTES
Mr. Ean Burris is here today for anticoagulation monitoring for the diagnosis of atrial fibrillation.  His INR goal is 2.0-3.0 and his current Coumadin dose is 10 mg everyday except Thursday and Sunday 5 mg.     Today's findings include an INR of 2.5     Considering Mr. Burris's past history, todays findings, and per the coumadin policy/protocol, Mr. Burris was instructed to take Coumadin as follows,  resume same dose of coumadin.  He was also instructed to come back in 1 weeks for an INR check.    A full discussion of the nature of anticoagulants has been carried out.  A full discussion of the need for frequent and regular monitoring, precise dosage adjustment and compliance was stressed.  Side effects of potential bleeding were discussed and Mr. Burris was instructed to call [phone] if there are any signs of abnormal bleeding.  Mr. Burris was instructed to avoid any OTC items containing aspirin or ibuprofen and prior to starting any new OTC products to consult with his physician or pharmacist to ensure no drug interactions are present.  Mr. Burris was instructed to avoid any major changes in his general diet and to avoid alcohol consumption.  .      Mr. Burris verbalized his understanding of all instructions and will call the office with any questions, concerns, or signs of abnormal bleeding or blood clot.

## 2024-05-22 ENCOUNTER — ANTI-COAG VISIT (OUTPATIENT)
Age: 79
End: 2024-05-22

## 2024-05-22 DIAGNOSIS — I48.91 AF (ATRIAL FIBRILLATION) (HCC): Primary | ICD-10-CM

## 2024-05-22 LAB — INR BLD: 2.1

## 2024-05-22 NOTE — PROGRESS NOTES
Mr. Ean Burris is here today for anticoagulation monitoring for the diagnosis of atrial fibrillation.  His INR goal is 2.0-3.0 and his current Coumadin dose is 10 mg everyday except 5 mg Thursday and Sunday.     Today's findings include an INR of 2.1     Considering Mr. Burris's past history, todays findings, and per the coumadin policy/protocol, Mr. Burris was instructed to take Coumadin as follows,  resume same dose of coumadin.  He was also instructed to come back in 1 weeks for an INR check.    A full discussion of the nature of anticoagulants has been carried out.  A full discussion of the need for frequent and regular monitoring, precise dosage adjustment and compliance was stressed.  Side effects of potential bleeding were discussed and Mr. Burris was instructed to call [phone] if there are any signs of abnormal bleeding.  Mr. Burris was instructed to avoid any OTC items containing aspirin or ibuprofen and prior to starting any new OTC products to consult with his physician or pharmacist to ensure no drug interactions are present.  Mr. Burris was instructed to avoid any major changes in his general diet and to avoid alcohol consumption.  .      Mr. Burris verbalized his understanding of all instructions and will call the office with any questions, concerns, or signs of abnormal bleeding or blood clot.

## 2024-05-29 ENCOUNTER — ANTI-COAG VISIT (OUTPATIENT)
Age: 79
End: 2024-05-29

## 2024-05-29 DIAGNOSIS — I48.91 AF (ATRIAL FIBRILLATION) (HCC): Primary | ICD-10-CM

## 2024-05-29 LAB — INR BLD: 2.3

## 2024-05-29 NOTE — PROGRESS NOTES
Mr. Ean Burris is here today for anticoagulation monitoring for the diagnosis of atrial fibrillation.  His INR goal is 2.0-3.0 and his current Coumadin dose is 10 mg daily except Sunday and Thursday 5 mg.     Today's findings include an INR of 2.3     Considering Mr. Burris's past history, todays findings, and per the coumadin policy/protocol, Mr. Burris was instructed to take Coumadin as follows,  resume current dose of coumadin.  He was also instructed to come back in 1 weeks for an INR check.    A full discussion of the nature of anticoagulants has been carried out.  A full discussion of the need for frequent and regular monitoring, precise dosage adjustment and compliance was stressed.  Side effects of potential bleeding were discussed and Mr. Burris was instructed to call [phone] if there are any signs of abnormal bleeding.  Mr. Burris was instructed to avoid any OTC items containing aspirin or ibuprofen and prior to starting any new OTC products to consult with his physician or pharmacist to ensure no drug interactions are present.  Mr. Burris was instructed to avoid any major changes in his general diet and to avoid alcohol consumption.  .      Mr. Burris verbalized his understanding of all instructions and will call the office with any questions, concerns, or signs of abnormal bleeding or blood clot.

## 2024-06-05 ENCOUNTER — ANTI-COAG VISIT (OUTPATIENT)
Age: 79
End: 2024-06-05

## 2024-06-05 DIAGNOSIS — I48.91 AF (ATRIAL FIBRILLATION) (HCC): Primary | ICD-10-CM

## 2024-06-05 LAB — INR BLD: 2.3

## 2024-06-05 RX ORDER — METOPROLOL SUCCINATE 25 MG/1
25 TABLET, EXTENDED RELEASE ORAL 2 TIMES DAILY
Qty: 180 TABLET | Refills: 0 | Status: SHIPPED | OUTPATIENT
Start: 2024-06-05

## 2024-06-05 NOTE — PROGRESS NOTES
Mr. Ean Burris is here today for anticoagulation monitoring for the diagnosis of atrial fibrillation.  His INR goal is 2.0-3.0 and his current Coumadin dose is 10 mg everyday except 5 mg Thursday and sunday.     Today's findings include an INR of 2.3     Considering Mr. Burris's past history, todays findings, and per the coumadin policy/protocol, Mr. Burris was instructed to take Coumadin as follows,  resume current dose of coumadin.  He was also instructed to come back in 1 weeks for an INR check.    A full discussion of the nature of anticoagulants has been carried out.  A full discussion of the need for frequent and regular monitoring, precise dosage adjustment and compliance was stressed.  Side effects of potential bleeding were discussed and Mr. Burris was instructed to call [phone] if there are any signs of abnormal bleeding.  Mr. Burris was instructed to avoid any OTC items containing aspirin or ibuprofen and prior to starting any new OTC products to consult with his physician or pharmacist to ensure no drug interactions are present.  Mr. Burris was instructed to avoid any major changes in his general diet and to avoid alcohol consumption.  .      Mr. Burris verbalized his understanding of all instructions and will call the office with any questions, concerns, or signs of abnormal bleeding or blood clot.

## 2024-06-12 ENCOUNTER — ANTI-COAG VISIT (OUTPATIENT)
Age: 79
End: 2024-06-12

## 2024-06-12 DIAGNOSIS — I48.91 AF (ATRIAL FIBRILLATION) (HCC): Primary | ICD-10-CM

## 2024-06-12 LAB — INR BLD: 2

## 2024-06-12 NOTE — PROGRESS NOTES
Mr. Ean Burris is here today for anticoagulation monitoring for the diagnosis of atrial fibrillation.  His INR goal is 2.0-3.0 and his current Coumadin dose is 5 mg every Thursday and Sunday and 10 mg AOD.     Today's findings include an INR of 2.0     Considering Mr. Burris's past history, todays findings, and per the coumadin policy/protocol, Mr. Burris was instructed to take Coumadin as follows,  resume current dose of coumadin.  He was also instructed to come back in 1 weeks for an INR check.    A full discussion of the nature of anticoagulants has been carried out.  A full discussion of the need for frequent and regular monitoring, precise dosage adjustment and compliance was stressed.  Side effects of potential bleeding were discussed and Mr. Burris was instructed to call [phone] if there are any signs of abnormal bleeding.  Mr. Burris was instructed to avoid any OTC items containing aspirin or ibuprofen and prior to starting any new OTC products to consult with his physician or pharmacist to ensure no drug interactions are present.  Mr. Burris was instructed to avoid any major changes in his general diet and to avoid alcohol consumption.  .      Mr. Burris verbalized his understanding of all instructions and will call the office with any questions, concerns, or signs of abnormal bleeding or blood clot.

## 2024-06-19 ENCOUNTER — ANTI-COAG VISIT (OUTPATIENT)
Age: 79
End: 2024-06-19

## 2024-06-19 DIAGNOSIS — I48.91 AF (ATRIAL FIBRILLATION) (HCC): Primary | ICD-10-CM

## 2024-06-19 LAB — INR BLD: 2.2

## 2024-06-19 NOTE — PROGRESS NOTES
Mr. Ean Burris is here today for anticoagulation monitoring for the diagnosis of atrial fibrillation.  His INR goal is 2.0-3.0 and his current Coumadin dose is 5 mg Thursday and Sunday and 10 mg AOD.     Today's findings include an INR of 2.2     Considering Mr. Burris's past history, todays findings, and per the coumadin policy/protocol, Mr. Burris was instructed to take Coumadin as follows,  resume current dose of coumadin.  He was also instructed to come back in 1 weeks for an INR check.    A full discussion of the nature of anticoagulants has been carried out.  A full discussion of the need for frequent and regular monitoring, precise dosage adjustment and compliance was stressed.  Side effects of potential bleeding were discussed and Mr. Burris was instructed to call [phone] if there are any signs of abnormal bleeding.  Mr. Burris was instructed to avoid any OTC items containing aspirin or ibuprofen and prior to starting any new OTC products to consult with his physician or pharmacist to ensure no drug interactions are present.  Mr. Burris was instructed to avoid any major changes in his general diet and to avoid alcohol consumption.  .      Mr. Burris verbalized his understanding of all instructions and will call the office with any questions, concerns, or signs of abnormal bleeding or blood clot.

## 2024-06-26 ENCOUNTER — ANTI-COAG VISIT (OUTPATIENT)
Age: 79
End: 2024-06-26

## 2024-06-26 DIAGNOSIS — I48.91 AF (ATRIAL FIBRILLATION) (HCC): Primary | ICD-10-CM

## 2024-06-26 LAB — INR BLD: 2.1

## 2024-07-03 ENCOUNTER — ANTI-COAG VISIT (OUTPATIENT)
Age: 79
End: 2024-07-03

## 2024-07-03 DIAGNOSIS — I48.91 AF (ATRIAL FIBRILLATION) (HCC): Primary | ICD-10-CM

## 2024-07-03 LAB — INR BLD: 2.4

## 2024-07-03 NOTE — PROGRESS NOTES
Mr. Ean Burris is here today for anticoagulation monitoring for the diagnosis of atrial fibrillation.  His INR goal is 2.0-3.0 and his current Coumadin dose is 10 mg everyday except Thursday and Sunday 5 mg .     Today's findings include an INR of 2.4     Considering Mr. Burris's past history, todays findings, and per the coumadin policy/protocol, Mr. Burris was instructed to take Coumadin as follows,  resume same dose of coumadin.  He was also instructed to come back in 1 weeks for an INR check.    A full discussion of the nature of anticoagulants has been carried out.  A full discussion of the need for frequent and regular monitoring, precise dosage adjustment and compliance was stressed.  Side effects of potential bleeding were discussed and Mr. Burris was instructed to call [phone] if there are any signs of abnormal bleeding.  Mr. Burris was instructed to avoid any OTC items containing aspirin or ibuprofen and prior to starting any new OTC products to consult with his physician or pharmacist to ensure no drug interactions are present.  Mr. Burris was instructed to avoid any major changes in his general diet and to avoid alcohol consumption.  .      Mr. Burris verbalized his understanding of all instructions and will call the office with any questions, concerns, or signs of abnormal bleeding or blood clot.

## 2024-07-08 ENCOUNTER — OFFICE VISIT (OUTPATIENT)
Age: 79
End: 2024-07-08
Payer: MEDICARE

## 2024-07-08 VITALS
HEIGHT: 71 IN | DIASTOLIC BLOOD PRESSURE: 80 MMHG | OXYGEN SATURATION: 97 % | HEART RATE: 86 BPM | SYSTOLIC BLOOD PRESSURE: 144 MMHG | BODY MASS INDEX: 20.86 KG/M2 | WEIGHT: 149 LBS

## 2024-07-08 DIAGNOSIS — I48.19 PERSISTENT ATRIAL FIBRILLATION (HCC): Primary | ICD-10-CM

## 2024-07-08 DIAGNOSIS — I11.9 HYPERTENSIVE HEART DISEASE WITHOUT HEART FAILURE: ICD-10-CM

## 2024-07-08 DIAGNOSIS — I48.20 CHRONIC ATRIAL FIBRILLATION, UNSPECIFIED (HCC): ICD-10-CM

## 2024-07-08 DIAGNOSIS — I34.0 NONRHEUMATIC MITRAL (VALVE) INSUFFICIENCY: ICD-10-CM

## 2024-07-08 DIAGNOSIS — I48.11 LONGSTANDING PERSISTENT ATRIAL FIBRILLATION (HCC): ICD-10-CM

## 2024-07-08 DIAGNOSIS — Z79.01 LONG TERM (CURRENT) USE OF ANTICOAGULANTS: ICD-10-CM

## 2024-07-08 LAB — Lab: NORMAL

## 2024-07-08 PROCEDURE — G8420 CALC BMI NORM PARAMETERS: HCPCS | Performed by: NURSE PRACTITIONER

## 2024-07-08 PROCEDURE — 93000 ELECTROCARDIOGRAM COMPLETE: CPT | Performed by: NURSE PRACTITIONER

## 2024-07-08 PROCEDURE — 1036F TOBACCO NON-USER: CPT | Performed by: NURSE PRACTITIONER

## 2024-07-08 PROCEDURE — G8427 DOCREV CUR MEDS BY ELIG CLIN: HCPCS | Performed by: NURSE PRACTITIONER

## 2024-07-08 PROCEDURE — 99214 OFFICE O/P EST MOD 30 MIN: CPT | Performed by: NURSE PRACTITIONER

## 2024-07-08 PROCEDURE — 1123F ACP DISCUSS/DSCN MKR DOCD: CPT | Performed by: NURSE PRACTITIONER

## 2024-07-08 ASSESSMENT — PATIENT HEALTH QUESTIONNAIRE - PHQ9
SUM OF ALL RESPONSES TO PHQ QUESTIONS 1-9: 0
SUM OF ALL RESPONSES TO PHQ9 QUESTIONS 1 & 2: 0
1. LITTLE INTEREST OR PLEASURE IN DOING THINGS: NOT AT ALL
2. FEELING DOWN, DEPRESSED OR HOPELESS: NOT AT ALL
SUM OF ALL RESPONSES TO PHQ QUESTIONS 1-9: 0

## 2024-07-08 NOTE — PROGRESS NOTES
1. Have you been to the ER, urgent care clinic since your last visit?  Hospitalized since your last visit?     Yes [facility]  pulled a muscle    2. Have you seen or consulted any other health care providers outside of the [facility] since your last visit?  Include any pap smears or colon screening.      no

## 2024-07-08 NOTE — PROGRESS NOTES
HISTORY OF PRESENT ILLNESS  Ean Burris is a 77 y.o. male.  Follow-up  Associated symptoms include shortness of breath.   Palpitations   The history is provided by the Patient. This is a chronic problem. The current episode started more than 1 week ago. The problem has not changed since onset.The problem occurs daily. Associated symptoms include shortness of breath. Pertinent negatives include no diaphoresis, no fever, no claudication, no near-syncope, no orthopnea, no PND, no syncope, no nausea, no vomiting, no leg pain, no dizziness, no weakness, no cough, no hemoptysis and no sputum production. Risk factors include dyslipidemia, hypertension and male gender. His past medical history is significant for atrial fibrillation. His past medical history does not include hyperthyroidism.   Shortness of Breath  The history is provided by the Patient. This is a chronic problem. The problem occurs intermittently.The current episode started more than 1 week ago. The problem has not changed since onset.Pertinent negatives include no fever, no ear pain, no neck pain, no cough, no sputum production, no hemoptysis, no wheezing, no PND, no orthopnea, no syncope, no vomiting, no rash, no leg pain, no leg swelling and no claudication. He has had Prior hospitalizations. Associated medical issues do not include chronic lung disease, CAD or heart failure.     Review of Systems   Constitutional:  Negative for chills, diaphoresis, fever and weight loss.   HENT:  Negative for ear pain and hearing loss.    Eyes:  Negative for blurred vision.   Respiratory:  Positive for shortness of breath. Negative for cough, hemoptysis, sputum production, wheezing and stridor.    Cardiovascular:  Positive for palpitations. Negative for orthopnea, claudication, leg swelling, syncope, PND and near-syncope.   Gastrointestinal:  Negative for heartburn, nausea and vomiting.   Musculoskeletal:  Negative for myalgias and neck pain.   Skin:  Negative for

## 2024-07-10 ENCOUNTER — ANTI-COAG VISIT (OUTPATIENT)
Age: 79
End: 2024-07-10

## 2024-07-10 DIAGNOSIS — I48.91 AF (ATRIAL FIBRILLATION) (HCC): Primary | ICD-10-CM

## 2024-07-10 LAB — INR BLD: 2.6

## 2024-07-10 NOTE — PROGRESS NOTES
Mr. Ean Burris is here today for anticoagulation monitoring for the diagnosis of atrial fibrillation.  His INR goal is 2.0-3.0 and his current Coumadin dose is 10 mg daily except Thursday and Sunday 5 mg.       Today's findings include an INR of 2.6     Considering Mr. Burris's past history, todays findings, and per the coumadin policy/protocol, Mr. Burris was instructed to take Coumadin as follows,  resume current dose of coumadin.  He was also instructed to come back in 1 weeks for an INR check.    A full discussion of the nature of anticoagulants has been carried out.  A full discussion of the need for frequent and regular monitoring, precise dosage adjustment and compliance was stressed.  Side effects of potential bleeding were discussed and Mr. Burris was instructed to call [phone] if there are any signs of abnormal bleeding.  Mr. Burris was instructed to avoid any OTC items containing aspirin or ibuprofen and prior to starting any new OTC products to consult with his physician or pharmacist to ensure no drug interactions are present.  Mr. Burris was instructed to avoid any major changes in his general diet and to avoid alcohol consumption.  .      Mr. Burris verbalized his understanding of all instructions and will call the office with any questions, concerns, or signs of abnormal bleeding or blood clot.

## 2024-07-17 ENCOUNTER — ANTI-COAG VISIT (OUTPATIENT)
Age: 79
End: 2024-07-17

## 2024-07-17 DIAGNOSIS — I48.91 AF (ATRIAL FIBRILLATION) (HCC): Primary | ICD-10-CM

## 2024-07-17 LAB — INR BLD: 2.7

## 2024-07-17 NOTE — PROGRESS NOTES
Mr. Ean Burris is here today for anticoagulation monitoring for the diagnosis of atrial fibrillation.  His INR goal is 2.0-3.0 and his current Coumadin dose is  10 mg everyday except Friday and Sunday 5 mg.     Today's findings include an INR of 2.7     Considering Mr. Burris's past history, todays findings, and per the coumadin policy/protocol, Mr. Burris was instructed to take Coumadin as follows,   resume same dose of coumadin.  He was also instructed to come back in 1 weeks for an INR check.    A full discussion of the nature of anticoagulants has been carried out.  A full discussion of the need for frequent and regular monitoring, precise dosage adjustment and compliance was stressed.  Side effects of potential bleeding were discussed and Mr. Burris was instructed to call [phone] if there are any signs of abnormal bleeding.  Mr. Burris was instructed to avoid any OTC items containing aspirin or ibuprofen and prior to starting any new OTC products to consult with his physician or pharmacist to ensure no drug interactions are present.  Mr. Burris was instructed to avoid any major changes in his general diet and to avoid alcohol consumption.  .      Mr. Burris verbalized his understanding of all instructions and will call the office with any questions, concerns, or signs of abnormal bleeding or blood clot.

## 2024-07-24 ENCOUNTER — ANTI-COAG VISIT (OUTPATIENT)
Age: 79
End: 2024-07-24

## 2024-07-24 DIAGNOSIS — I48.91 AF (ATRIAL FIBRILLATION) (HCC): Primary | ICD-10-CM

## 2024-07-24 LAB — INR BLD: 2.3

## 2024-07-24 NOTE — PROGRESS NOTES
Mr. Ean Burris is here today for anticoagulation monitoring for the diagnosis of atrial fibrillation.  His INR goal is 2.0-3.0 and his current Coumadin dose is 10 mg every day except Thursday and Sunday 5 mg .     Today's findings include an INR of 2.3     Considering Mr. Burris's past history, todays findings, and per the coumadin policy/protocol, Mr. Burris was instructed to take Coumadin as follows,  resume current dose of coumadin.  He was also instructed to come back in 1 weeks for an INR check.    A full discussion of the nature of anticoagulants has been carried out.  A full discussion of the need for frequent and regular monitoring, precise dosage adjustment and compliance was stressed.  Side effects of potential bleeding were discussed and Mr. Burris was instructed to call [phone] if there are any signs of abnormal bleeding.  Mr. Burris was instructed to avoid any OTC items containing aspirin or ibuprofen and prior to starting any new OTC products to consult with his physician or pharmacist to ensure no drug interactions are present.  Mr. Burris was instructed to avoid any major changes in his general diet and to avoid alcohol consumption.  .      Mr. Burris verbalized his understanding of all instructions and will call the office with any questions, concerns, or signs of abnormal bleeding or blood clot.

## 2024-07-31 LAB — INR BLD: 1.8

## 2024-08-01 ENCOUNTER — ANTI-COAG VISIT (OUTPATIENT)
Age: 79
End: 2024-08-01

## 2024-08-01 DIAGNOSIS — I48.91 AF (ATRIAL FIBRILLATION) (HCC): Primary | ICD-10-CM

## 2024-08-01 NOTE — PROGRESS NOTES
Mr. Ean Burris is here today for anticoagulation monitoring for the diagnosis of atrial fibrillation.  His INR goal is 2.0-3.0 and his current Coumadin dose is 10 mg everyday except 5 mg Thursday and Sunday.     Today's findings include an INR of 1.8     Considering Mr. Burris's past history, todays findings, and per the coumadin policy/protocol, Mr. Burris was instructed to take Coumadin as follows,  resume same dose of coumadin.  He was also instructed to come back in 1 weeks for an INR check.    A full discussion of the nature of anticoagulants has been carried out.  A full discussion of the need for frequent and regular monitoring, precise dosage adjustment and compliance was stressed.  Side effects of potential bleeding were discussed and Mr. Burris was instructed to call [phone] if there are any signs of abnormal bleeding.  Mr. Burris was instructed to avoid any OTC items containing aspirin or ibuprofen and prior to starting any new OTC products to consult with his physician or pharmacist to ensure no drug interactions are present.  Mr. Burris was instructed to avoid any major changes in his general diet and to avoid alcohol consumption.  .      Mr. Burris verbalized his understanding of all instructions and will call the office with any questions, concerns, or signs of abnormal bleeding or blood clot.

## 2024-08-07 ENCOUNTER — ANTI-COAG VISIT (OUTPATIENT)
Age: 79
End: 2024-08-07

## 2024-08-07 DIAGNOSIS — I48.91 AF (ATRIAL FIBRILLATION) (HCC): Primary | ICD-10-CM

## 2024-08-07 LAB — INR BLD: 2.2

## 2024-08-07 NOTE — PROGRESS NOTES
Mr. Ean Burris is here today for anticoagulation monitoring for the diagnosis of atrial fibrillation.  His INR goal is 2.0-3.0 and his current Coumadin dose is 5 mg every Thursday and Sunday and 10 mg AOD.     Today's findings include an INR of 2.2     Considering Mr. Burris's past history, todays findings, and per the coumadin policy/protocol, Mr. Burris was instructed to take Coumadin as follows,  resume current dose of coumadin.  He was also instructed to come back in 1 weeks for an INR check.    A full discussion of the nature of anticoagulants has been carried out.  A full discussion of the need for frequent and regular monitoring, precise dosage adjustment and compliance was stressed.  Side effects of potential bleeding were discussed and Mr. Burris was instructed to call [phone] if there are any signs of abnormal bleeding.  Mr. Burris was instructed to avoid any OTC items containing aspirin or ibuprofen and prior to starting any new OTC products to consult with his physician or pharmacist to ensure no drug interactions are present.  Mr. Burris was instructed to avoid any major changes in his general diet and to avoid alcohol consumption.  .      Mr. Burris verbalized his understanding of all instructions and will call the office with any questions, concerns, or signs of abnormal bleeding or blood clot.

## 2024-08-14 ENCOUNTER — ANTI-COAG VISIT (OUTPATIENT)
Age: 79
End: 2024-08-14

## 2024-08-14 DIAGNOSIS — I48.91 AF (ATRIAL FIBRILLATION) (HCC): Primary | ICD-10-CM

## 2024-08-14 LAB — INR BLD: 2.4

## 2024-08-21 ENCOUNTER — ANTI-COAG VISIT (OUTPATIENT)
Age: 79
End: 2024-08-21

## 2024-08-21 DIAGNOSIS — I48.91 AF (ATRIAL FIBRILLATION) (HCC): Primary | ICD-10-CM

## 2024-08-21 LAB — INR BLD: 1.9

## 2024-08-21 NOTE — PROGRESS NOTES
Mr. Ean Burris is here today for anticoagulation monitoring for the diagnosis of atrial fibrillation.  His INR goal is 2.0-3.0 and his current Coumadin dose is 10 mg every day and 5mg Sunday and Thursday .     Today's findings include an INR of 1.9     Considering Mr. Burris's past history, todays findings, and per the coumadin policy/protocol, Mr. Burris was instructed to take Coumadin as follows, resume current regimen however tomorrow (8/22) he will take 10 mg instead of 5mg. He was also instructed to come back in 1 weeks for an INR check.    A full discussion of the nature of anticoagulants has been carried out.  A full discussion of the need for frequent and regular monitoring, precise dosage adjustment and compliance was stressed.  Side effects of potential bleeding were discussed and Mr. Burris was instructed to call [phone] if there are any signs of abnormal bleeding.  Mr. Burris was instructed to avoid any OTC items containing aspirin or ibuprofen and prior to starting any new OTC products to consult with his physician or pharmacist to ensure no drug interactions are present.  Mr. Burris was instructed to avoid any major changes in his general diet and to avoid alcohol consumption.  .      Mr. Burris verbalized his understanding of all instructions and will call the office with any questions, concerns, or signs of abnormal bleeding or blood clot.

## 2024-08-28 ENCOUNTER — ANTI-COAG VISIT (OUTPATIENT)
Age: 79
End: 2024-08-28

## 2024-08-28 DIAGNOSIS — I48.91 AF (ATRIAL FIBRILLATION) (HCC): Primary | ICD-10-CM

## 2024-08-28 LAB — INR BLD: 2.4

## 2024-08-29 NOTE — PROGRESS NOTES
Mr. Ean Burris is here today for anticoagulation monitoring for the diagnosis of atrial fibrillation.  His INR goal is 2.0-3.0 and his current Coumadin dose is 7.5 mg  & 10 mg.     Today's findings include an INR of 2.4     Considering Mr. Burris's past history, todays findings, and per the coumadin policy/protocol, Mr. Burris was instructed to take Coumadin as follows,  5 mg every Sun, Thu; 10 mg all other day.  He was also instructed to come back in 1 weeks for an INR check.    A full discussion of the nature of anticoagulants has been carried out.  A full discussion of the need for frequent and regular monitoring, precise dosage adjustment and compliance was stressed.  Side effects of potential bleeding were discussed and Mr. Burris was instructed to call [phone] if there are any signs of abnormal bleeding.  Mr. Burris was instructed to avoid any OTC items containing aspirin or ibuprofen and prior to starting any new OTC products to consult with his physician or pharmacist to ensure no drug interactions are present.  Mr. Burris was instructed to avoid any major changes in his general diet and to avoid alcohol consumption.  .      Mr. Burris verbalized his understanding of all instructions and will call the office with any questions, concerns, or signs of abnormal bleeding or blood clot.

## 2024-09-04 ENCOUNTER — ANTI-COAG VISIT (OUTPATIENT)
Age: 79
End: 2024-09-04

## 2024-09-04 DIAGNOSIS — I48.91 AF (ATRIAL FIBRILLATION) (HCC): Primary | ICD-10-CM

## 2024-09-04 LAB — INR BLD: 2.3

## 2024-09-04 NOTE — PROGRESS NOTES
Mr. Ean Burris is here today for anticoagulation monitoring for the diagnosis of atrial fibrillation.  His INR goal is 2.0-3.0 and his current Coumadin dose is 10 mg daily except Thursday and Sunday 5 mg .     Today's findings include an INR of 2.3     Considering Mr. Burris's past history, todays findings, and per the coumadin policy/protocol, Mr. Burris was instructed to take Coumadin as follows,  resume same dose of coumadin.  He was also instructed to come back in 1 weeks for an INR check.    A full discussion of the nature of anticoagulants has been carried out.  A full discussion of the need for frequent and regular monitoring, precise dosage adjustment and compliance was stressed.  Side effects of potential bleeding were discussed and Mr. Burris was instructed to call [phone] if there are any signs of abnormal bleeding.  Mr. Burris was instructed to avoid any OTC items containing aspirin or ibuprofen and prior to starting any new OTC products to consult with his physician or pharmacist to ensure no drug interactions are present.  Mr. Burris was instructed to avoid any major changes in his general diet and to avoid alcohol consumption.  .      Mr. Burris verbalized his understanding of all instructions and will call the office with any questions, concerns, or signs of abnormal bleeding or blood clot.

## 2024-09-05 RX ORDER — METOPROLOL SUCCINATE 25 MG/1
25 TABLET, EXTENDED RELEASE ORAL 2 TIMES DAILY
Qty: 180 TABLET | Refills: 0 | Status: SHIPPED | OUTPATIENT
Start: 2024-09-05

## 2024-09-11 ENCOUNTER — ANTI-COAG VISIT (OUTPATIENT)
Age: 79
End: 2024-09-11

## 2024-09-11 DIAGNOSIS — I48.91 AF (ATRIAL FIBRILLATION) (HCC): Primary | ICD-10-CM

## 2024-09-11 LAB — INR BLD: 2.3

## 2024-09-18 ENCOUNTER — ANTI-COAG VISIT (OUTPATIENT)
Age: 79
End: 2024-09-18

## 2024-09-18 DIAGNOSIS — I48.91 AF (ATRIAL FIBRILLATION) (HCC): Primary | ICD-10-CM

## 2024-09-18 LAB — INR BLD: 2.3

## 2024-09-19 RX ORDER — WARFARIN SODIUM 5 MG/1
TABLET ORAL
Qty: 120 TABLET | Refills: 0 | Status: SHIPPED | OUTPATIENT
Start: 2024-09-19

## 2024-09-23 RX ORDER — WARFARIN SODIUM 5 MG/1
TABLET ORAL
Qty: 120 TABLET | Refills: 2 | Status: SHIPPED | OUTPATIENT
Start: 2024-09-23

## 2024-09-25 ENCOUNTER — ANTI-COAG VISIT (OUTPATIENT)
Age: 79
End: 2024-09-25

## 2024-09-25 DIAGNOSIS — I48.91 AF (ATRIAL FIBRILLATION) (HCC): Primary | ICD-10-CM

## 2024-09-25 LAB — INR BLD: 2.3

## 2024-10-02 ENCOUNTER — ANTI-COAG VISIT (OUTPATIENT)
Age: 79
End: 2024-10-02

## 2024-10-02 DIAGNOSIS — I48.91 AF (ATRIAL FIBRILLATION) (HCC): Primary | ICD-10-CM

## 2024-10-02 LAB — INR BLD: 3

## 2024-10-09 ENCOUNTER — ANTI-COAG VISIT (OUTPATIENT)
Age: 79
End: 2024-10-09

## 2024-10-09 DIAGNOSIS — I48.91 AF (ATRIAL FIBRILLATION) (HCC): Primary | ICD-10-CM

## 2024-10-09 LAB — INR BLD: 2.2

## 2024-10-09 NOTE — PROGRESS NOTES
Mr. Ean Burris is here today for anticoagulation monitoring for the diagnosis of atrial fibrillation.  His INR goal is 2.0-3.0 and his current Coumadin dose is   Maintenance plan:5 mg every Sun, Thu; 10 mg all other days .     Today's findings include an INR of 2.2     Considering Mr. Burris's past history, todays findings, and per the coumadin policy/protocol, Mr. Burris was instructed to take Coumadin as follows,  resume current medication regimen.  He was also instructed to come back in 1 weeks for an INR check.    A full discussion of the nature of anticoagulants has been carried out.  A full discussion of the need for frequent and regular monitoring, precise dosage adjustment and compliance was stressed.  Side effects of potential bleeding were discussed and Mr. Burris was instructed to call [phone] if there are any signs of abnormal bleeding.  Mr. Burris was instructed to avoid any OTC items containing aspirin or ibuprofen and prior to starting any new OTC products to consult with his physician or pharmacist to ensure no drug interactions are present.  Mr. Burris was instructed to avoid any major changes in his general diet and to avoid alcohol consumption.  .      Mr. Burris verbalized his understanding of all instructions and will call the office with any questions, concerns, or signs of abnormal bleeding or blood clot.

## 2024-10-11 NOTE — PATIENT INSTRUCTIONS
High Blood Pressure: Care Instructions  Your Care Instructions    If your blood pressure is usually above 140/90, you have high blood pressure, or hypertension. That means the top number is 140 or higher or the bottom number is 90 or higher, or both. Despite what a lot of people think, high blood pressure usually doesn't cause headaches or make you feel dizzy or lightheaded. It usually has no symptoms. But it does increase your risk for heart attack, stroke, and kidney or eye damage. The higher your blood pressure, the more your risk increases. Your doctor will give you a goal for your blood pressure. Your goal will be based on your health and your age. An example of a goal is to keep your blood pressure below 140/90. Lifestyle changes, such as eating healthy and being active, are always important to help lower blood pressure. You might also take medicine to reach your blood pressure goal.  Follow-up care is a key part of your treatment and safety. Be sure to make and go to all appointments, and call your doctor if you are having problems. It's also a good idea to know your test results and keep a list of the medicines you take. How can you care for yourself at home? Medical treatment  · If you stop taking your medicine, your blood pressure will go back up. You may take one or more types of medicine to lower your blood pressure. Be safe with medicines. Take your medicine exactly as prescribed. Call your doctor if you think you are having a problem with your medicine. · Talk to your doctor before you start taking aspirin every day. Aspirin can help certain people lower their risk of a heart attack or stroke. But taking aspirin isn't right for everyone, because it can cause serious bleeding. · See your doctor regularly. You may need to see the doctor more often at first or until your blood pressure comes down.   · If you are taking blood pressure medicine, talk to your doctor before you take decongestants Physical Therapy Visit    Visit Type: Daily Treatment Note  Visit: 12  Referring Provider: Francisco Terrell MD  Medical Diagnosis (from order): S83.511S - Rupture of anterior cruciate ligament of right knee, sequela     SUBJECTIVE                                                                                                               Leg was more tired after blood flow restriction. Did use one crutch after. Next day a little better and was able to walk without crutches at home but more sore.       OBJECTIVE                                                                                                                     Range of Motion (ROM)   (degrees unless noted; active unless noted; norms in ( ); negative=lacking to 0, positive=beyond 0)  Knee:   - Flexion (150):       Right:   Passive: 115                      Treatment     Therapeutic Exercise  Active assisted range of motion heel slide using strap to pull to avoid hamstring activation and heel slider  - 2 x 10 with belt    Passive range of motion knee flexion   - 1 x 10, right     Standing hip abduction   - 2 x 10, bilateral   *light hand hold assist     Standing hip extension  - 2 x 10, bilateral   *light hand hold assist     Ambulation 175' with no crutch. Good mechanics. Recommend single crutch outside of home and no crutch in home but single crutch when fatigued.         Neuromuscular Re-Education  Flat ground:  - tandem 2 x 30 seconds, bilateral   - tandem with horizontal head turn x 20, bilateral   - tandem with eyes closed 3 x 30 seconds, bilateral     Airex:  - modified tandem 2 x 30 seconds, bilateral   - modified tandem with horizontal head turns x 20  - modified tandem with vertical head turns x 20    Skilled input: verbal instruction/cues and tactile instruction/cues    Writer verbally educated and received verbal consent for hand placement, positioning of patient, and techniques to be performed today from patient for clothing adjustments for  or anti-inflammatory medicine, such as ibuprofen. Some of these medicines can raise blood pressure. · Learn how to check your blood pressure at home. Lifestyle changes  · Stay at a healthy weight. This is especially important if you put on weight around the waist. Losing even 10 pounds can help you lower your blood pressure. · If your doctor recommends it, get more exercise. Walking is a good choice. Bit by bit, increase the amount you walk every day. Try for at least 30 minutes on most days of the week. You also may want to swim, bike, or do other activities. · Avoid or limit alcohol. Talk to your doctor about whether you can drink any alcohol. · Try to limit how much sodium you eat to less than 2,300 milligrams (mg) a day. Your doctor may ask you to try to eat less than 1,500 mg a day. · Eat plenty of fruits (such as bananas and oranges), vegetables, legumes, whole grains, and low-fat dairy products. · Lower the amount of saturated fat in your diet. Saturated fat is found in animal products such as milk, cheese, and meat. Limiting these foods may help you lose weight and also lower your risk for heart disease. · Do not smoke. Smoking increases your risk for heart attack and stroke. If you need help quitting, talk to your doctor about stop-smoking programs and medicines. These can increase your chances of quitting for good. When should you call for help? Call 911 anytime you think you may need emergency care. This may mean having symptoms that suggest that your blood pressure is causing a serious heart or blood vessel problem. Your blood pressure may be over 180/110. ? For example, call 911 if:  ? · You have symptoms of a heart attack. These may include:  ¨ Chest pain or pressure, or a strange feeling in the chest.  ¨ Sweating. ¨ Shortness of breath. ¨ Nausea or vomiting.   ¨ Pain, pressure, or a strange feeling in the back, neck, jaw, or upper belly or in one or both shoulders or techniques, therapist position for techniques and hand placement and palpation for techniques as described above and how they are pertinent to the patient's plan of care.  Home Exercise Program  Access Code: [email or web address]  URL: [email or web address]  Date: 10/11/2024  Prepared by: Kassandra Hernandez    Exercises  - Supine Quad Set  - 3 x daily - 7 x weekly - 2 sets - 10 reps - 5 hold  - Seated Ankle Pumps  - 10 x daily - 7 x weekly - 1 sets - 10 reps  - Straight Leg Raise  - 1 x daily - 7 x weekly - 2 sets - 10 reps  - Supine Short Arc Quad  - 1 x daily - 7 x weekly - 2 sets - 10 reps - 1-2 hold  - Supine Hip Abduction  - 1 x daily - 7 x weekly - 2 sets - 10 reps  - Tandem Stance  - 1 x daily - 7 x weekly - 3 sets - 20 hold  - Tandem Stance with Head Rotation  - 1 x daily - 7 x weekly - 3 sets - 20 hold  - Tandem Stance with Eyes Closed  - 1 x daily - 7 x weekly - 3 sets - 20 hold      ASSESSMENT                                                                                                            Able to initiate balance and stability exercises with some fatigue but increased use of ankle strategy for recover. Increased challenge with vestibular input. Patient remains limited with end range knee flexion range of motion but continues to progress gradually. Patient will continue to benefit from skilled Physical Therapy in order to address impairments listed prior and progress functional limitations.      Education:   - Results of above outlined education: Verbalizes understanding and Demonstrates understanding    PLAN                                                                                                                           Suggestions for next session as indicated: Progress per plan of care     Progress toward single leg balance, progress knee flexion range of motion, strengthening within protocol limits.       Therapy procedure time and total treatment time can be found documented  arms.  ¨ Lightheadedness or sudden weakness. ¨ A fast or irregular heartbeat. ? · You have symptoms of a stroke. These may include:  ¨ Sudden numbness, tingling, weakness, or loss of movement in your face, arm, or leg, especially on only one side of your body. ¨ Sudden vision changes. ¨ Sudden trouble speaking. ¨ Sudden confusion or trouble understanding simple statements. ¨ Sudden problems with walking or balance. ¨ A sudden, severe headache that is different from past headaches. ? · You have severe back or belly pain. ?Do not wait until your blood pressure comes down on its own. Get help right away. ?Call your doctor now or seek immediate care if:  ? · Your blood pressure is much higher than normal (such as 180/110 or higher), but you don't have symptoms. ? · You think high blood pressure is causing symptoms, such as:  ¨ Severe headache. ¨ Blurry vision. ? Watch closely for changes in your health, and be sure to contact your doctor if:  ? · Your blood pressure measures 140/90 or higher at least 2 times. That means the top number is 140 or higher or the bottom number is 90 or higher, or both. ? · You think you may be having side effects from your blood pressure medicine. ? · Your blood pressure is usually normal, but it goes above normal at least 2 times. Where can you learn more? Go to http://rayray-bonifacio.info/. Enter O492 in the search box to learn more about \"High Blood Pressure: Care Instructions. \"  Current as of: September 21, 2016  Content Version: 11.4  © 4960-6662 [employer]. Care instructions adapted under license by [employer] (which disclaims liability or warranty for this information). If you have questions about a medical condition or this instruction, always ask your healthcare professional. [place] [address] any warranty or liability for your use of this information. on the Time Entry flowsheet

## 2024-10-16 ENCOUNTER — ANTI-COAG VISIT (OUTPATIENT)
Age: 79
End: 2024-10-16

## 2024-10-16 DIAGNOSIS — I48.91 AF (ATRIAL FIBRILLATION) (HCC): Primary | ICD-10-CM

## 2024-10-16 LAB — INR BLD: 2

## 2024-10-23 ENCOUNTER — ANTI-COAG VISIT (OUTPATIENT)
Age: 79
End: 2024-10-23

## 2024-10-23 DIAGNOSIS — I48.91 AF (ATRIAL FIBRILLATION) (HCC): Primary | ICD-10-CM

## 2024-10-23 LAB — INR BLD: 2.4

## 2024-10-23 NOTE — PROGRESS NOTES
Mr. Ean Burris is here today for anticoagulation monitoring for the diagnosis of atrial fibrillation.  His INR goal is 2.0-3.0 and his current Coumadin dose is 10 mg every day except Sunday and Thursday 5 mg.     Today's findings include an INR of 2.4     Considering Mr. Burris's past history, todays findings, and per the coumadin policy/protocol, Mr. Burris was instructed to take Coumadin as follows,  resume current dose of coumadin.  He was also instructed to come back in 1 weeks for an INR check.    A full discussion of the nature of anticoagulants has been carried out.  A full discussion of the need for frequent and regular monitoring, precise dosage adjustment and compliance was stressed.  Side effects of potential bleeding were discussed and Mr. Burris was instructed to call [phone] if there are any signs of abnormal bleeding.  Mr. Burris was instructed to avoid any OTC items containing aspirin or ibuprofen and prior to starting any new OTC products to consult with his physician or pharmacist to ensure no drug interactions are present.  Mr. Burris was instructed to avoid any major changes in his general diet and to avoid alcohol consumption.  .      Mr. Burris verbalized his understanding of all instructions and will call the office with any questions, concerns, or signs of abnormal bleeding or blood clot.

## 2024-10-30 LAB — INR BLD: 2.7

## 2024-10-31 ENCOUNTER — ANTI-COAG VISIT (OUTPATIENT)
Age: 79
End: 2024-10-31

## 2024-10-31 DIAGNOSIS — I48.91 AF (ATRIAL FIBRILLATION) (HCC): Primary | ICD-10-CM

## 2024-10-31 NOTE — PROGRESS NOTES
Mr. Ean Burris is here today for anticoagulation monitoring for the diagnosis of atrial fibrillation.  His INR goal is 2.0-3.0 and his current Coumadin dose is 5 mg every Sun, Thu; 10 mg all other days .     Today's findings include an INR of 2.7     Considering Mr. Burris's past history, todays findings, and per the coumadin policy/protocol, Mr. Burris was instructed to take Coumadin as follows,  resume current medication regimen.  He was also instructed to come back in 1 weeks for an INR check.    A full discussion of the nature of anticoagulants has been carried out.  A full discussion of the need for frequent and regular monitoring, precise dosage adjustment and compliance was stressed.  Side effects of potential bleeding were discussed and Mr. Burris was instructed to call [phone] if there are any signs of abnormal bleeding.  Mr. Burris was instructed to avoid any OTC items containing aspirin or ibuprofen and prior to starting any new OTC products to consult with his physician or pharmacist to ensure no drug interactions are present.  Mr. Burris was instructed to avoid any major changes in his general diet and to avoid alcohol consumption.  .      Mr. Burris verbalized his understanding of all instructions and will call the office with any questions, concerns, or signs of abnormal bleeding or blood clot.

## 2024-11-06 ENCOUNTER — ANTI-COAG VISIT (OUTPATIENT)
Age: 79
End: 2024-11-06

## 2024-11-06 DIAGNOSIS — I48.91 AF (ATRIAL FIBRILLATION) (HCC): Primary | ICD-10-CM

## 2024-11-06 LAB — INR BLD: 2.3

## 2024-11-06 NOTE — PROGRESS NOTES
Mr. Ean Burris is here today for anticoagulation monitoring for the diagnosis of atrial fibrillation.  His INR goal is 2.0-3.0 and his current Coumadin dose is 10 mg everyday except Sunday and Thursday 5 mg .     Today's findings include an INR of 2.3     Considering Mr. Bruris's past history, todays findings, and per the coumadin policy/protocol, Mr. Burris was instructed to take Coumadin as follows,  resume same dose of coumadin.  He was also instructed to come back in 1 weeks for an INR check.    A full discussion of the nature of anticoagulants has been carried out.  A full discussion of the need for frequent and regular monitoring, precise dosage adjustment and compliance was stressed.  Side effects of potential bleeding were discussed and Mr. Burris was instructed to call [phone] if there are any signs of abnormal bleeding.  Mr. Burris was instructed to avoid any OTC items containing aspirin or ibuprofen and prior to starting any new OTC products to consult with his physician or pharmacist to ensure no drug interactions are present.  Mr. Burris was instructed to avoid any major changes in his general diet and to avoid alcohol consumption.  .      Mr. Burris verbalized his understanding of all instructions and will call the office with any questions, concerns, or signs of abnormal bleeding or blood clot.

## 2024-11-13 ENCOUNTER — ANTI-COAG VISIT (OUTPATIENT)
Age: 79
End: 2024-11-13

## 2024-11-13 DIAGNOSIS — I48.91 AF (ATRIAL FIBRILLATION) (HCC): Primary | ICD-10-CM

## 2024-11-13 LAB — INR BLD: 2.4

## 2024-11-13 NOTE — PROGRESS NOTES
Mr. Ean Burris is here today for anticoagulation monitoring for the diagnosis of atrial fibrillation.  His INR goal is 2.0-3.0 and his current Coumadin dose is 10 mg everyday except Sunday and Thursday 5 mg.     Today's findings include an INR of 2.4     Considering Mr. Burris's past history, todays findings, and per the coumadin policy/protocol, Mr. Burris was instructed to take Coumadin as follows,  resume current dose of coumadin.  He was also instructed to come back in 1 weeks for an INR check.    A full discussion of the nature of anticoagulants has been carried out.  A full discussion of the need for frequent and regular monitoring, precise dosage adjustment and compliance was stressed.  Side effects of potential bleeding were discussed and Mr. Burris was instructed to call [phone] if there are any signs of abnormal bleeding.  Mr. Burris was instructed to avoid any OTC items containing aspirin or ibuprofen and prior to starting any new OTC products to consult with his physician or pharmacist to ensure no drug interactions are present.  Mr. Burris was instructed to avoid any major changes in his general diet and to avoid alcohol consumption.  .      Mr. Burris verbalized his understanding of all instructions and will call the office with any questions, concerns, or signs of abnormal bleeding or blood clot.

## 2024-11-20 ENCOUNTER — ANTI-COAG VISIT (OUTPATIENT)
Age: 79
End: 2024-11-20

## 2024-11-20 DIAGNOSIS — I48.91 AF (ATRIAL FIBRILLATION) (HCC): Primary | ICD-10-CM

## 2024-11-20 LAB — INR BLD: 2.8

## 2024-11-20 NOTE — PROGRESS NOTES
Mr. Ean Burris is here today for anticoagulation monitoring for the diagnosis of atrial fibrillation.  His INR goal is 2.0-3.0 and his current Coumadin dose is 10 mg everyday except 5 mg Thursday and Sunday..     Today's findings include an INR of 2.8     Considering Mr. Burris's past history, todays findings, and per the coumadin policy/protocol, Mr. Burris was instructed to take Coumadin as follows,  resume same dose of coumadin.  He was also instructed to come back in 1 weeks for an INR check.    A full discussion of the nature of anticoagulants has been carried out.  A full discussion of the need for frequent and regular monitoring, precise dosage adjustment and compliance was stressed.  Side effects of potential bleeding were discussed and Mr. Burris was instructed to call [phone] if there are any signs of abnormal bleeding.  Mr. Burris was instructed to avoid any OTC items containing aspirin or ibuprofen and prior to starting any new OTC products to consult with his physician or pharmacist to ensure no drug interactions are present.  Mr. Burris was instructed to avoid any major changes in his general diet and to avoid alcohol consumption.  .      Mr. Burris verbalized his understanding of all instructions and will call the office with any questions, concerns, or signs of abnormal bleeding or blood clot.

## 2024-11-22 ENCOUNTER — OFFICE VISIT (OUTPATIENT)
Age: 79
End: 2024-11-22
Payer: MEDICARE

## 2024-11-22 VITALS
OXYGEN SATURATION: 100 % | HEIGHT: 71 IN | DIASTOLIC BLOOD PRESSURE: 81 MMHG | HEART RATE: 90 BPM | SYSTOLIC BLOOD PRESSURE: 137 MMHG | BODY MASS INDEX: 20.55 KG/M2 | WEIGHT: 146.8 LBS

## 2024-11-22 DIAGNOSIS — I48.11 LONGSTANDING PERSISTENT ATRIAL FIBRILLATION (HCC): Primary | ICD-10-CM

## 2024-11-22 DIAGNOSIS — I35.0 NONRHEUMATIC AORTIC (VALVE) STENOSIS: ICD-10-CM

## 2024-11-22 DIAGNOSIS — I34.0 NONRHEUMATIC MITRAL (VALVE) INSUFFICIENCY: ICD-10-CM

## 2024-11-22 DIAGNOSIS — Z79.01 LONG TERM (CURRENT) USE OF ANTICOAGULANTS: ICD-10-CM

## 2024-11-22 DIAGNOSIS — I11.9 HYPERTENSIVE HEART DISEASE WITHOUT HEART FAILURE: ICD-10-CM

## 2024-11-22 PROCEDURE — 1123F ACP DISCUSS/DSCN MKR DOCD: CPT | Performed by: NURSE PRACTITIONER

## 2024-11-22 PROCEDURE — G8427 DOCREV CUR MEDS BY ELIG CLIN: HCPCS | Performed by: NURSE PRACTITIONER

## 2024-11-22 PROCEDURE — 1160F RVW MEDS BY RX/DR IN RCRD: CPT | Performed by: NURSE PRACTITIONER

## 2024-11-22 PROCEDURE — 99214 OFFICE O/P EST MOD 30 MIN: CPT | Performed by: NURSE PRACTITIONER

## 2024-11-22 PROCEDURE — 1036F TOBACCO NON-USER: CPT | Performed by: NURSE PRACTITIONER

## 2024-11-22 PROCEDURE — G8420 CALC BMI NORM PARAMETERS: HCPCS | Performed by: NURSE PRACTITIONER

## 2024-11-22 PROCEDURE — G8484 FLU IMMUNIZE NO ADMIN: HCPCS | Performed by: NURSE PRACTITIONER

## 2024-11-22 PROCEDURE — 1159F MED LIST DOCD IN RCRD: CPT | Performed by: NURSE PRACTITIONER

## 2024-11-22 PROCEDURE — 1126F AMNT PAIN NOTED NONE PRSNT: CPT | Performed by: NURSE PRACTITIONER

## 2024-11-22 ASSESSMENT — PATIENT HEALTH QUESTIONNAIRE - PHQ9
SUM OF ALL RESPONSES TO PHQ QUESTIONS 1-9: 0
1. LITTLE INTEREST OR PLEASURE IN DOING THINGS: NOT AT ALL
SUM OF ALL RESPONSES TO PHQ QUESTIONS 1-9: 0
SUM OF ALL RESPONSES TO PHQ QUESTIONS 1-9: 0
2. FEELING DOWN, DEPRESSED OR HOPELESS: NOT AT ALL
SUM OF ALL RESPONSES TO PHQ QUESTIONS 1-9: 0
SUM OF ALL RESPONSES TO PHQ9 QUESTIONS 1 & 2: 0

## 2024-11-22 NOTE — PROGRESS NOTES
1. Have you been to the ER, urgent care clinic since your last visit?  Hospitalized since your last visit?     no    2. Have you seen or consulted any other health care providers outside of the [facility] since your last visit?  Include any pap smears or colon screening.      Yes pcp   
Summary  Show Result Comparison     Left Ventricle: Normal left ventricular systolic function with a visually estimated EF of 55 - 60%. Left ventricle size is normal. Normal wall thickness. Normal wall motion.    Aortic Valve: Mild regurgitation. Mild stenosis of the aortic valve. AV mean gradient is 17 mmHg. AV peak gradient is 34 mmHg. LVOT:AV VTI Index is 0.27. AV area by continuity VTI is 0.9 cm2.    Mitral Valve: No thickening of the subvalvular apparatus. Moderately thickened leaflets. Mild regurgitation with multiple jets. Mild stenosis noted. MV mean gradient is 5 mmHg. MV area by continuity equation is 1.3 cm2.    Tricuspid Valve: Moderate regurgitation.    Left Atrium: Left atrium is severely dilated. LA Vol Index A/L is 48 mL/m2.    Right Atrium: Right atrium is severely dilated.    Pulmonary Arteries: Mild pulmonary hypertension present. The estimated PASP is 38 mmHg.    Aorta: Mildly dilated aortic root. Ao root diameter is 3.7 cm. Moderately dilated ascending aorta. Ao ascending diameter is 4.3 cm.    Image quality is fair.   Comparison Study Information  Prior Study  There is a prior study available for comparison. Prior study date: 4/9/2022. As compared to the previous study, there are significant changes. Mean gradient across aortic valve was 12 mmHg on the previous study and is now 17 mmHg. MV mean gradient increased.     7/2022-digoxin level  8/2022-CMP, lipid hemoglobin A1c      THERAPEUTIC DRUG MONITORING   [facility]07/11/2023  Component 07/11/2023 07/05/2022 08/31/2021         Digoxin 0.5 Low     0.7 Low     0.6 Low         Assessment      Diagnosis Orders   1. Longstanding persistent atrial fibrillation (HCC)        2. Nonrheumatic mitral (valve) insufficiency        3. Hypertensive heart disease without heart failure        4. Long term (current) use of anticoagulants        5. Nonrheumatic aortic (valve) stenosis          There are no discontinued medications.    No orders of the

## 2024-11-27 ENCOUNTER — ANTI-COAG VISIT (OUTPATIENT)
Age: 79
End: 2024-11-27

## 2024-11-27 DIAGNOSIS — I48.91 AF (ATRIAL FIBRILLATION) (HCC): Primary | ICD-10-CM

## 2024-11-27 LAB — INR BLD: 3.3

## 2024-11-27 NOTE — PROGRESS NOTES
Mr. Ean Burris is here today for anticoagulation monitoring for the diagnosis of atrial fibrillation.  His INR goal is 2.0-3.0 and his current Coumadin dose is 5 mg every Sunday and Thursday and 10 mg AOD.     Today's findings include an INR of 3.3     Considering Mr. Burris's past history, todays findings, and per the coumadin policy/protocol, Mr. Burris was instructed to take Coumadin as follows,  Hold tonight then resume current dose of coumadin.  He was also instructed to come back in 1 weeks for an INR check.    A full discussion of the nature of anticoagulants has been carried out.  A full discussion of the need for frequent and regular monitoring, precise dosage adjustment and compliance was stressed.  Side effects of potential bleeding were discussed and Mr. Burris was instructed to call [phone] if there are any signs of abnormal bleeding.  Mr. Burris was instructed to avoid any OTC items containing aspirin or ibuprofen and prior to starting any new OTC products to consult with his physician or pharmacist to ensure no drug interactions are present.  Mr. Burris was instructed to avoid any major changes in his general diet and to avoid alcohol consumption.  .      Mr. Burris verbalized his understanding of all instructions and will call the office with any questions, concerns, or signs of abnormal bleeding or blood clot.

## 2024-12-04 ENCOUNTER — ANTI-COAG VISIT (OUTPATIENT)
Age: 79
End: 2024-12-04

## 2024-12-04 DIAGNOSIS — I48.91 AF (ATRIAL FIBRILLATION) (HCC): Primary | ICD-10-CM

## 2024-12-04 LAB — INR BLD: 2

## 2024-12-04 NOTE — PROGRESS NOTES
Mr. Ean Burris is here today for anticoagulation monitoring for the diagnosis of atrial fibrillation.  His INR goal is 2.0-3.0 and his current Coumadin dose is 10 mg everyday except Sunday and Thursday.     Today's findings include an INR of 2.0     Considering Mr. Burris's past history, todays findings, and per the coumadin policy/protocol, Mr. Burris was instructed to take Coumadin as follows,  resume current dose of coumadin.  He was also instructed to come back in 1 weeks for an INR check.    A full discussion of the nature of anticoagulants has been carried out.  A full discussion of the need for frequent and regular monitoring, precise dosage adjustment and compliance was stressed.  Side effects of potential bleeding were discussed and Mr. Burris was instructed to call [phone] if there are any signs of abnormal bleeding.  Mr. Burris was instructed to avoid any OTC items containing aspirin or ibuprofen and prior to starting any new OTC products to consult with his physician or pharmacist to ensure no drug interactions are present.  Mr. Burris was instructed to avoid any major changes in his general diet and to avoid alcohol consumption.  .      Mr. Burris verbalized his understanding of all instructions and will call the office with any questions, concerns, or signs of abnormal bleeding or blood clot.

## 2024-12-06 RX ORDER — METOPROLOL SUCCINATE 25 MG/1
25 TABLET, EXTENDED RELEASE ORAL 2 TIMES DAILY
Qty: 180 TABLET | Refills: 0 | Status: SHIPPED | OUTPATIENT
Start: 2024-12-06

## 2024-12-11 ENCOUNTER — ANTI-COAG VISIT (OUTPATIENT)
Age: 79
End: 2024-12-11

## 2024-12-11 DIAGNOSIS — I48.91 AF (ATRIAL FIBRILLATION) (HCC): Primary | ICD-10-CM

## 2024-12-11 LAB — INR BLD: 2.7

## 2024-12-11 NOTE — PROGRESS NOTES
Mr. Ean Burris is here today for anticoagulation monitoring for the diagnosis of atrial fibrillation.  His INR goal is 2.0-3.0 and his current Coumadin dose is 10 mg everyday except Thursday and Sunday 5 mg .     Today's findings include an INR of 2.7     Considering Mr. Burris's past history, todays findings, and per the coumadin policy/protocol, Mr. Burris was instructed to take Coumadin as follows,  resume same dose of coumadin.  He was also instructed to come back in 1 weeks for an INR check.    A full discussion of the nature of anticoagulants has been carried out.  A full discussion of the need for frequent and regular monitoring, precise dosage adjustment and compliance was stressed.  Side effects of potential bleeding were discussed and Mr. Burris was instructed to call [phone] if there are any signs of abnormal bleeding.  Mr. Burris was instructed to avoid any OTC items containing aspirin or ibuprofen and prior to starting any new OTC products to consult with his physician or pharmacist to ensure no drug interactions are present.  Mr. Burris was instructed to avoid any major changes in his general diet and to avoid alcohol consumption.  .      Mr. Burris verbalized his understanding of all instructions and will call the office with any questions, concerns, or signs of abnormal bleeding or blood clot.

## 2024-12-18 ENCOUNTER — ANTI-COAG VISIT (OUTPATIENT)
Age: 79
End: 2024-12-18

## 2024-12-18 DIAGNOSIS — I48.91 AF (ATRIAL FIBRILLATION) (HCC): Primary | ICD-10-CM

## 2024-12-18 LAB — INR BLD: 2.5

## 2024-12-18 NOTE — PROGRESS NOTES
Mr. Ean Burris is here today for anticoagulation monitoring for the diagnosis of atrial fibrillation.  His INR goal is 2.0-3.0 and his current Coumadin dose is 10 mg every day except Sunday and Thursday 5 mg.     Today's findings include an INR of 2.5     Considering Mr. Burris's past history, todays findings, and per the coumadin policy/protocol, Mr. Burris was instructed to take Coumadin as follows,  resume current dose of coumadin.  He was also instructed to come back in 1 weeks for an INR check.    A full discussion of the nature of anticoagulants has been carried out.  A full discussion of the need for frequent and regular monitoring, precise dosage adjustment and compliance was stressed.  Side effects of potential bleeding were discussed and Mr. Burris was instructed to call [phone] if there are any signs of abnormal bleeding.  Mr. Burris was instructed to avoid any OTC items containing aspirin or ibuprofen and prior to starting any new OTC products to consult with his physician or pharmacist to ensure no drug interactions are present.  Mr. Burris was instructed to avoid any major changes in his general diet and to avoid alcohol consumption.  .      Mr. Burris verbalized his understanding of all instructions and will call the office with any questions, concerns, or signs of abnormal bleeding or blood clot.

## 2024-12-26 ENCOUNTER — ANTI-COAG VISIT (OUTPATIENT)
Age: 79
End: 2024-12-26

## 2024-12-26 DIAGNOSIS — I48.91 AF (ATRIAL FIBRILLATION) (HCC): Primary | ICD-10-CM

## 2024-12-26 LAB — INR BLD: 1.8

## 2024-12-26 NOTE — PROGRESS NOTES
Mr. Ean Burris is here today for anticoagulation monitoring for the diagnosis of atrial fibrillation.  His INR goal is 2.0-3.0 and his current Coumadin dose is 5 mg every Sunday and Thursday and 10 mg AOD.     Today's findings include an INR of 1.8     Considering Mr. Burris's past history, todays findings, and per the coumadin policy/protocol, Mr. Burris was instructed to take Coumadin as follows,  10 mg everyday except 5 mg on sunday .  He was also instructed to come back in 1 weeks for an INR check.    A full discussion of the nature of anticoagulants has been carried out.  A full discussion of the need for frequent and regular monitoring, precise dosage adjustment and compliance was stressed.  Side effects of potential bleeding were discussed and Mr. Burris was instructed to call [phone] if there are any signs of abnormal bleeding.  Mr. Burris was instructed to avoid any OTC items containing aspirin or ibuprofen and prior to starting any new OTC products to consult with his physician or pharmacist to ensure no drug interactions are present.  Mr. Burris was instructed to avoid any major changes in his general diet and to avoid alcohol consumption.  .      Mr. Burris verbalized his understanding of all instructions and will call the office with any questions, concerns, or signs of abnormal bleeding or blood clot.

## 2025-01-02 ENCOUNTER — ANTI-COAG VISIT (OUTPATIENT)
Age: 80
End: 2025-01-02

## 2025-01-02 DIAGNOSIS — I48.91 AF (ATRIAL FIBRILLATION) (HCC): Primary | ICD-10-CM

## 2025-01-02 LAB — INR BLD: 2.1

## 2025-01-02 NOTE — PROGRESS NOTES
Mr. Ean Burris is here today for anticoagulation monitoring for the diagnosis of atrial fibrillation.  His INR goal is 2.0-3.0 and his current Coumadin dose is 5 mg every Sun, Thu; 10 mg all other days .     Today's findings include an INR of 2.1     Considering Mr. Burris's past history, todays findings, and per the coumadin policy/protocol, Mr. Burris was instructed to take Coumadin as follows,  5 mg every Sun, Thu; 10 mg all other days .  He was also instructed to come back in 1 weeks for an INR check.    A full discussion of the nature of anticoagulants has been carried out.  A full discussion of the need for frequent and regular monitoring, precise dosage adjustment and compliance was stressed.  Side effects of potential bleeding were discussed and Mr. Burris was instructed to call [phone] if there are any signs of abnormal bleeding.  Mr. Burris was instructed to avoid any OTC items containing aspirin or ibuprofen and prior to starting any new OTC products to consult with his physician or pharmacist to ensure no drug interactions are present.  Mr. Burris was instructed to avoid any major changes in his general diet and to avoid alcohol consumption.  .      Mr. Burris verbalized his understanding of all instructions and will call the office with any questions, concerns, or signs of abnormal bleeding or blood clot.

## 2025-01-09 ENCOUNTER — ANTI-COAG VISIT (OUTPATIENT)
Age: 80
End: 2025-01-09

## 2025-01-09 DIAGNOSIS — I48.91 AF (ATRIAL FIBRILLATION) (HCC): Primary | ICD-10-CM

## 2025-01-09 LAB — INR BLD: 2.1

## 2025-01-09 NOTE — PROGRESS NOTES
Mr. Ean Burris is here today for anticoagulation monitoring for the diagnosis of atrial fibrillation.  His INR goal is 2.0-3.0 and his current Coumadin dose is 5 mg every Sun, Thu; 10 mg all other days .     Today's findings include an INR of 2.1     Considering Mr. Burris's past history, todays findings, and per the coumadin policy/protocol, Mr. Burris was instructed to take Coumadin as follows,  5 mg every Sun, Thu; 10 mg all other days .  He was also instructed to come back in 1 weeks for an INR check.    A full discussion of the nature of anticoagulants has been carried out.  A full discussion of the need for frequent and regular monitoring, precise dosage adjustment and compliance was stressed.  Side effects of potential bleeding were discussed and Mr. Burris was instructed to call [phone] if there are any signs of abnormal bleeding.  Mr. Burris was instructed to avoid any OTC items containing aspirin or ibuprofen and prior to starting any new OTC products to consult with his physician or pharmacist to ensure no drug interactions are present.  Mr. Bruris was instructed to avoid any major changes in his general diet and to avoid alcohol consumption.  .      Mr. Burris verbalized his understanding of all instructions and will call the office with any questions, concerns, or signs of abnormal bleeding or blood clot.

## 2025-01-16 ENCOUNTER — ANTI-COAG VISIT (OUTPATIENT)
Age: 80
End: 2025-01-16

## 2025-01-16 DIAGNOSIS — I48.91 AF (ATRIAL FIBRILLATION) (HCC): Primary | ICD-10-CM

## 2025-01-16 LAB — INR BLD: 2.4

## 2025-01-16 NOTE — PROGRESS NOTES
Mr. Ean Burris is here today for anticoagulation monitoring for the diagnosis of atrial fibrillation.  His INR goal is 2.0-3.0 and his current Coumadin dose is 5 mg every Sun, Thu; 10 mg all other days .     Today's findings include an INR of 2.4     Considering Mr. Burris's past history, todays findings, and per the coumadin policy/protocol, Mr. Burris was instructed to take Coumadin as follows,  5 mg every Sun, Thu; 10 mg all other days .  He was also instructed to come back in 1 weeks for an INR check.    A full discussion of the nature of anticoagulants has been carried out.  A full discussion of the need for frequent and regular monitoring, precise dosage adjustment and compliance was stressed.  Side effects of potential bleeding were discussed and Mr. Burris was instructed to call [phone] if there are any signs of abnormal bleeding.  Mr. Burris was instructed to avoid any OTC items containing aspirin or ibuprofen and prior to starting any new OTC products to consult with his physician or pharmacist to ensure no drug interactions are present.  Mr. Burris was instructed to avoid any major changes in his general diet and to avoid alcohol consumption.  .      Mr. Burris verbalized his understanding of all instructions and will call the office with any questions, concerns, or signs of abnormal bleeding or blood clot.

## 2025-01-23 ENCOUNTER — ANTI-COAG VISIT (OUTPATIENT)
Age: 80
End: 2025-01-23

## 2025-01-23 DIAGNOSIS — I48.91 AF (ATRIAL FIBRILLATION) (HCC): Primary | ICD-10-CM

## 2025-01-23 LAB — INR BLD: 2

## 2025-01-23 NOTE — PROGRESS NOTES
Mr. Ean Burris is here today for anticoagulation monitoring for the diagnosis of atrial fibrillation.  His INR goal is 2.0-3.0 and his current Coumadin dose is 5 mg every Sun, Thu; 10 mg all other days .     Today's findings include an INR of 2     Considering Mr. Burris's past history, todays findings, and per the coumadin policy/protocol, Mr. Burris was instructed to take Coumadin as follows,  5 mg every Sun, Thu; 10 mg all other days .  He was also instructed to come back in 1 weeks for an INR check.    A full discussion of the nature of anticoagulants has been carried out.  A full discussion of the need for frequent and regular monitoring, precise dosage adjustment and compliance was stressed.  Side effects of potential bleeding were discussed and Mr. Burris was instructed to call [phone] if there are any signs of abnormal bleeding.  Mr. Burris was instructed to avoid any OTC items containing aspirin or ibuprofen and prior to starting any new OTC products to consult with his physician or pharmacist to ensure no drug interactions are present.  Mr. Burris was instructed to avoid any major changes in his general diet and to avoid alcohol consumption.  .      Mr. Burris verbalized his understanding of all instructions and will call the office with any questions, concerns, or signs of abnormal bleeding or blood clot.

## 2025-01-30 ENCOUNTER — ANTI-COAG VISIT (OUTPATIENT)
Age: 80
End: 2025-01-30

## 2025-01-30 DIAGNOSIS — I48.91 AF (ATRIAL FIBRILLATION) (HCC): Primary | ICD-10-CM

## 2025-01-30 LAB — INR BLD: 2.2

## 2025-01-30 NOTE — PROGRESS NOTES
Mr. Ean Burris is here today for anticoagulation monitoring for the diagnosis of atrial fibrillation.  His INR goal is 2.0-3.0 and his current Coumadin dose is 10 mg everyday except 5 mg Thursday and Sunday.     Today's findings include an INR of 2.2     Considering Mr. Burris's past history, todays findings, and per the coumadin policy/protocol, Mr. Burris was instructed to take Coumadin as follows,  resume current dose of coumadin.  He was also instructed to come back in 1 weeks for an INR check.    A full discussion of the nature of anticoagulants has been carried out.  A full discussion of the need for frequent and regular monitoring, precise dosage adjustment and compliance was stressed.  Side effects of potential bleeding were discussed and Mr. Burris was instructed to call [phone] if there are any signs of abnormal bleeding.  Mr. Burris was instructed to avoid any OTC items containing aspirin or ibuprofen and prior to starting any new OTC products to consult with his physician or pharmacist to ensure no drug interactions are present.  Mr. Burris was instructed to avoid any major changes in his general diet and to avoid alcohol consumption.  .      Mr. Burris verbalized his understanding of all instructions and will call the office with any questions, concerns, or signs of abnormal bleeding or blood clot.

## 2025-02-06 ENCOUNTER — ANTI-COAG VISIT (OUTPATIENT)
Age: 80
End: 2025-02-06

## 2025-02-06 DIAGNOSIS — I48.91 AF (ATRIAL FIBRILLATION) (HCC): Primary | ICD-10-CM

## 2025-02-06 LAB — INR BLD: 1.9

## 2025-02-06 NOTE — PROGRESS NOTES
Mr. Ean Burris is here today for anticoagulation monitoring for the diagnosis of atrial fibrillation.  His INR goal is 2.0-3.0 and his current Coumadin dose is 10 mg daily except Sunday and Thursday 5 mg.     Today's findings include an INR of 1.9     Considering Mr. Burris's past history, todays findings, and per the coumadin policy/protocol, Mr. Burris was instructed to take Coumadin as follows,  take 10 mg tonight then resume current dose of coumadin.  He was also instructed to come back in 1 weeks for an INR check.    A full discussion of the nature of anticoagulants has been carried out.  A full discussion of the need for frequent and regular monitoring, precise dosage adjustment and compliance was stressed.  Side effects of potential bleeding were discussed and Mr. Burris was instructed to call [phone] if there are any signs of abnormal bleeding.  Mr. Burris was instructed to avoid any OTC items containing aspirin or ibuprofen and prior to starting any new OTC products to consult with his physician or pharmacist to ensure no drug interactions are present.  Mr. Burris was instructed to avoid any major changes in his general diet and to avoid alcohol consumption.  .      Mr. Burris verbalized his understanding of all instructions and will call the office with any questions, concerns, or signs of abnormal bleeding or blood clot.

## 2025-02-13 ENCOUNTER — ANTI-COAG VISIT (OUTPATIENT)
Age: 80
End: 2025-02-13

## 2025-02-13 DIAGNOSIS — I48.91 AF (ATRIAL FIBRILLATION) (HCC): Primary | ICD-10-CM

## 2025-02-13 LAB — INR BLD: 2.4

## 2025-02-13 NOTE — PROGRESS NOTES
Mr. Ean Burris is here today for anticoagulation monitoring for the diagnosis of atrial fibrillation.  His INR goal is 2.0-3.0 and his current Coumadin dose is 5 mg every Sun, Thu; 10 mg all other days .     Today's findings include an INR of 2.4     Considering Mr. Burris's past history, todays findings, and per the coumadin policy/protocol, Mr. Burris was instructed to take Coumadin as follows,  5 mg every Sun, Thu; 10 mg all other days .  He was also instructed to come back in 1 weeks for an INR check.    A full discussion of the nature of anticoagulants has been carried out.  A full discussion of the need for frequent and regular monitoring, precise dosage adjustment and compliance was stressed.  Side effects of potential bleeding were discussed and Mr. Burris was instructed to call [phone] if there are any signs of abnormal bleeding.  Mr. Burris was instructed to avoid any OTC items containing aspirin or ibuprofen and prior to starting any new OTC products to consult with his physician or pharmacist to ensure no drug interactions are present.  Mr. Burris was instructed to avoid any major changes in his general diet and to avoid alcohol consumption.  .      Mr. Burris verbalized his understanding of all instructions and will call the office with any questions, concerns, or signs of abnormal bleeding or blood clot.

## 2025-02-20 LAB — INR BLD: 2.1

## 2025-02-21 ENCOUNTER — ANTI-COAG VISIT (OUTPATIENT)
Age: 80
End: 2025-02-21

## 2025-02-21 DIAGNOSIS — I48.91 AF (ATRIAL FIBRILLATION) (HCC): Primary | ICD-10-CM

## 2025-02-21 NOTE — PROGRESS NOTES
Mr. Ean Burris is here today for anticoagulation monitoring for the diagnosis of atrial fibrillation.  His INR goal is 2.0-3.0 and his current Coumadin dose is 10 mg everyday except Sunday and Thursday 5 mg .     Today's findings include an INR of 2.1     Considering Mr. Burris's past history, todays findings, and per the coumadin policy/protocol, Mr. Burris was instructed to take Coumadin as follows,  resume same dose of coumadin.  He was also instructed to come back in 1 weeks for an INR check.    A full discussion of the nature of anticoagulants has been carried out.  A full discussion of the need for frequent and regular monitoring, precise dosage adjustment and compliance was stressed.  Side effects of potential bleeding were discussed and Mr. Burris was instructed to call [phone] if there are any signs of abnormal bleeding.  Mr. Burris was instructed to avoid any OTC items containing aspirin or ibuprofen and prior to starting any new OTC products to consult with his physician or pharmacist to ensure no drug interactions are present.  Mr. Burris was instructed to avoid any major changes in his general diet and to avoid alcohol consumption.  .      Mr. Burris verbalized his understanding of all instructions and will call the office with any questions, concerns, or signs of abnormal bleeding or blood clot.

## 2025-02-27 ENCOUNTER — ANTI-COAG VISIT (OUTPATIENT)
Age: 80
End: 2025-02-27

## 2025-02-27 DIAGNOSIS — I48.91 AF (ATRIAL FIBRILLATION) (HCC): Primary | ICD-10-CM

## 2025-02-27 LAB — INR BLD: 1.9

## 2025-02-27 NOTE — PROGRESS NOTES
Mr. Ean Burris is here today for anticoagulation monitoring for the diagnosis of atrial fibrillation.  His INR goal is 2.0-3.0 and his current Coumadin dose is 10 mg daily except Sunday and Thursday 5 mg.     Today's findings include an INR of 1.9     Considering Mr. Burris's past history, todays findings, and per the coumadin policy/protocol, Mr. Burris was instructed to take Coumadin as follows,  take 10 mg tonight  then resume current dose.  He was also instructed to come back in 1 weeks for an INR check.    A full discussion of the nature of anticoagulants has been carried out.  A full discussion of the need for frequent and regular monitoring, precise dosage adjustment and compliance was stressed.  Side effects of potential bleeding were discussed and Mr. Burris was instructed to call [phone] if there are any signs of abnormal bleeding.  Mr. Burris was instructed to avoid any OTC items containing aspirin or ibuprofen and prior to starting any new OTC products to consult with his physician or pharmacist to ensure no drug interactions are present.  Mr. Burris was instructed to avoid any major changes in his general diet and to avoid alcohol consumption.  .      Mr. Burris verbalized his understanding of all instructions and will call the office with any questions, concerns, or signs of abnormal bleeding or blood clot.

## 2025-03-06 LAB — INR BLD: 2.1

## 2025-03-07 ENCOUNTER — ANTI-COAG VISIT (OUTPATIENT)
Age: 80
End: 2025-03-07

## 2025-03-07 DIAGNOSIS — I48.91 AF (ATRIAL FIBRILLATION) (HCC): Primary | ICD-10-CM

## 2025-03-07 NOTE — PROGRESS NOTES
Mr. Ean Burris is here today for anticoagulation monitoring for the diagnosis of atrial fibrillation.  His INR goal is 2.0-3.0 and his current Coumadin dose is !0 mg daily except Sunday and Thursday 5 mg.     Today's findings include an INR of 2.1     Considering Mr. Burris's past history, todays findings, and per the coumadin policy/protocol, Mr. Burris was instructed to take Coumadin as follows,  resume same dose of coumadin.  He was also instructed to come back in 1 weeks for an INR check.    A full discussion of the nature of anticoagulants has been carried out.  A full discussion of the need for frequent and regular monitoring, precise dosage adjustment and compliance was stressed.  Side effects of potential bleeding were discussed and Mr. Burris was instructed to call [phone] if there are any signs of abnormal bleeding.  Mr. Burris was instructed to avoid any OTC items containing aspirin or ibuprofen and prior to starting any new OTC products to consult with his physician or pharmacist to ensure no drug interactions are present.  Mr. Burris was instructed to avoid any major changes in his general diet and to avoid alcohol consumption.  .      Mr. Burris verbalized his understanding of all instructions and will call the office with any questions, concerns, or signs of abnormal bleeding or blood clot.

## 2025-03-13 ENCOUNTER — ANTI-COAG VISIT (OUTPATIENT)
Age: 80
End: 2025-03-13

## 2025-03-13 DIAGNOSIS — I48.91 AF (ATRIAL FIBRILLATION) (HCC): Primary | ICD-10-CM

## 2025-03-13 LAB — INR BLD: 2.1

## 2025-03-13 NOTE — PROGRESS NOTES
Mr. Ean Burris is here today for anticoagulation monitoring for the diagnosis of atrial fibrillation.  His INR goal is 2.0-3.0 and his current Coumadin dose is 5 mg every Sun, Thu; 10 mg all other days .     Today's findings include an INR of 2.1     Considering Mr. Burris's past history, todays findings, and per the coumadin policy/protocol, Mr. Burris was instructed to take Coumadin as follows,  5 mg every Sun, Thu; 10 mg all other days .  He was also instructed to come back in 1 weeks for an INR check.    A full discussion of the nature of anticoagulants has been carried out.  A full discussion of the need for frequent and regular monitoring, precise dosage adjustment and compliance was stressed.  Side effects of potential bleeding were discussed and Mr. uBrris was instructed to call [phone] if there are any signs of abnormal bleeding.  Mr. Burris was instructed to avoid any OTC items containing aspirin or ibuprofen and prior to starting any new OTC products to consult with his physician or pharmacist to ensure no drug interactions are present.  Mr. Burris was instructed to avoid any major changes in his general diet and to avoid alcohol consumption.  .      Mr. Burris verbalized his understanding of all instructions and will call the office with any questions, concerns, or signs of abnormal bleeding or blood clot.

## 2025-03-20 ENCOUNTER — ANTI-COAG VISIT (OUTPATIENT)
Age: 80
End: 2025-03-20

## 2025-03-20 DIAGNOSIS — I48.91 AF (ATRIAL FIBRILLATION) (HCC): Primary | ICD-10-CM

## 2025-03-20 LAB — INR BLD: 2.1

## 2025-03-20 NOTE — PROGRESS NOTES
Mr. Ean Burris is here today for anticoagulation monitoring for the diagnosis of atrial fibrillation.  His INR goal is 2.0-3.0 and his current Coumadin dose is 5 mg every Thursday and Sunday and 10 mg AOD.     Today's findings include an INR of 2.1     Considering Mr. Burris's past history, todays findings, and per the coumadin policy/protocol, Mr. Burris was instructed to take Coumadin as follows,  resume same dose of coumadin.  He was also instructed to come back in 1 weeks for an INR check.    A full discussion of the nature of anticoagulants has been carried out.  A full discussion of the need for frequent and regular monitoring, precise dosage adjustment and compliance was stressed.  Side effects of potential bleeding were discussed and Mr. Burris was instructed to call [phone] if there are any signs of abnormal bleeding.  Mr. Burris was instructed to avoid any OTC items containing aspirin or ibuprofen and prior to starting any new OTC products to consult with his physician or pharmacist to ensure no drug interactions are present.  Mr. Burris was instructed to avoid any major changes in his general diet and to avoid alcohol consumption.  .      Mr. Burris verbalized his understanding of all instructions and will call the office with any questions, concerns, or signs of abnormal bleeding or blood clot.

## 2025-03-27 ENCOUNTER — ANTI-COAG VISIT (OUTPATIENT)
Age: 80
End: 2025-03-27

## 2025-03-27 DIAGNOSIS — I48.91 AF (ATRIAL FIBRILLATION) (HCC): Primary | ICD-10-CM

## 2025-03-27 LAB — INR BLD: 2.3

## 2025-03-27 NOTE — PROGRESS NOTES
Mr. Ean Burris is here today for anticoagulation monitoring for the diagnosis of atrial fibrillation.  His INR goal is 2.0-3.0 and his current Coumadin dose is 10 mg every day except Sunday and Thursday. .     Today's findings include an INR of 2.3      Considering Mr. Burris's past history, todays findings, and per the coumadin policy/protocol, Mr. Burris was instructed to take Coumadin as follows,  resume same dose of coumadin.  He was also instructed to come back in 1 weeks for an INR check.    A full discussion of the nature of anticoagulants has been carried out.  A full discussion of the need for frequent and regular monitoring, precise dosage adjustment and compliance was stressed.  Side effects of potential bleeding were discussed and Mr. Burris was instructed to call [phone] if there are any signs of abnormal bleeding.  Mr. Burris was instructed to avoid any OTC items containing aspirin or ibuprofen and prior to starting any new OTC products to consult with his physician or pharmacist to ensure no drug interactions are present.  Mr. Burris was instructed to avoid any major changes in his general diet and to avoid alcohol consumption.  .      Mr. Burris verbalized his understanding of all instructions and will call the office with any questions, concerns, or signs of abnormal bleeding or blood clot.

## 2025-04-03 ENCOUNTER — ANTI-COAG VISIT (OUTPATIENT)
Age: 80
End: 2025-04-03

## 2025-04-03 DIAGNOSIS — I48.91 AF (ATRIAL FIBRILLATION) (HCC): Primary | ICD-10-CM

## 2025-04-03 LAB — INR BLD: 2.2

## 2025-04-03 NOTE — PROGRESS NOTES
Mr. Ean Burris is here today for anticoagulation monitoring for the diagnosis of atrial fibrillation.  His INR goal is 2.0-3.0 and his current Coumadin dose is 5 mg every Sunday & Thursday.and 10 mg all other days    Today's findings include an INR of 2.2     Considering Mr. Burris's past history, todays findings, and per the coumadin policy/protocol, Mr. Burris was instructed to take Coumadin as follows,  continue taking 5 mg on Tuesday & Sunday and 10 mg all other days. He was also instructed to come back in 1 weeks for an INR check.    A full discussion of the nature of anticoagulants has been carried out.  A full discussion of the need for frequent and regular monitoring, precise dosage adjustment and compliance was stressed.  Side effects of potential bleeding were discussed and Mr. Burris was instructed to call [phone] if there are any signs of abnormal bleeding.  Mr. Burris was instructed to avoid any OTC items containing aspirin or ibuprofen and prior to starting any new OTC products to consult with his physician or pharmacist to ensure no drug interactions are present.  Mr. Burris was instructed to avoid any major changes in his general diet and to avoid alcohol consumption.  .      Mr. Burris verbalized his understanding of all instructions and will call the office with any questions, concerns, or signs of abnormal bleeding or blood clot.

## 2025-04-10 ENCOUNTER — ANTI-COAG VISIT (OUTPATIENT)
Age: 80
End: 2025-04-10

## 2025-04-10 DIAGNOSIS — I48.20 CHRONIC ATRIAL FIBRILLATION, UNSPECIFIED (HCC): ICD-10-CM

## 2025-04-10 DIAGNOSIS — I48.91 AF (ATRIAL FIBRILLATION) (HCC): Primary | ICD-10-CM

## 2025-04-10 LAB — INR BLD: 2.7

## 2025-04-10 NOTE — PROGRESS NOTES
Mr. Ean Burris is here today for anticoagulation monitoring for the diagnosis of atrial fibrillation.  His INR goal is 2.0-3.0 and his current Coumadin dose is 5 mg every Thursday and Sunday and 10 mg AOD.     Today's findings include an INR of 2.7     Considering Mr. Burris's past history, todays findings, and per the coumadin policy/protocol, Mr. Burris was instructed to take Coumadin as follows,  resume current dose of coumadin.  He was also instructed to come back in 1 weeks for an INR check.    A full discussion of the nature of anticoagulants has been carried out.  A full discussion of the need for frequent and regular monitoring, precise dosage adjustment and compliance was stressed.  Side effects of potential bleeding were discussed and Mr. Burris was instructed to call [phone] if there are any signs of abnormal bleeding.  Mr. Burris was instructed to avoid any OTC items containing aspirin or ibuprofen and prior to starting any new OTC products to consult with his physician or pharmacist to ensure no drug interactions are present.  Mr. Burris was instructed to avoid any major changes in his general diet and to avoid alcohol consumption.  .      Mr. Burris verbalized his understanding of all instructions and will call the office with any questions, concerns, or signs of abnormal bleeding or blood clot.

## 2025-04-11 RX ORDER — DIGOXIN 125 MCG
TABLET ORAL
Qty: 90 TABLET | Refills: 0 | Status: SHIPPED | OUTPATIENT
Start: 2025-04-11

## 2025-04-17 ENCOUNTER — ANTI-COAG VISIT (OUTPATIENT)
Age: 80
End: 2025-04-17

## 2025-04-17 DIAGNOSIS — I48.91 AF (ATRIAL FIBRILLATION) (HCC): Primary | ICD-10-CM

## 2025-04-17 LAB — INR BLD: 2.5

## 2025-04-17 NOTE — PROGRESS NOTES
Mr. Ean Burris is here today for anticoagulation monitoring for the diagnosis of atrial fibrillation.  His INR goal is 2.0-3.0 and his current Coumadin dose is 5 mg every Sun, Thu; 10 mg all other days .     Today's findings include an INR of 2.5     Considering Mr. Burris's past history, todays findings, and per the coumadin policy/protocol, Mr. Burris was instructed to take Coumadin as follows,  5 mg every Sun, Thu; 10 mg all other days .  He was also instructed to come back in 1 weeks for an INR check.    A full discussion of the nature of anticoagulants has been carried out.  A full discussion of the need for frequent and regular monitoring, precise dosage adjustment and compliance was stressed.  Side effects of potential bleeding were discussed and Mr. Burris was instructed to call [phone] if there are any signs of abnormal bleeding.  Mr. Burris was instructed to avoid any OTC items containing aspirin or ibuprofen and prior to starting any new OTC products to consult with his physician or pharmacist to ensure no drug interactions are present.  Mr. Burris was instructed to avoid any major changes in his general diet and to avoid alcohol consumption.  .      Mr. Burris verbalized his understanding of all instructions and will call the office with any questions, concerns, or signs of abnormal bleeding or blood clot.

## 2025-04-24 ENCOUNTER — ANTI-COAG VISIT (OUTPATIENT)
Age: 80
End: 2025-04-24

## 2025-04-24 DIAGNOSIS — I48.91 AF (ATRIAL FIBRILLATION) (HCC): Primary | ICD-10-CM

## 2025-04-24 LAB — INR BLD: 2.4

## 2025-04-24 NOTE — PROGRESS NOTES
Mr. Ean Burris is here today for anticoagulation monitoring for the diagnosis of atrial fibrillation.  His INR goal is 2.0-3.0 and his current Coumadin dose is 5 mg every Sun, Thu; 10 mg all other days .     Today's findings include an INR of 2.4     Considering Mr. Burris's past history, todays findings, and per the coumadin policy/protocol, Mr. Burris was instructed to take Coumadin as follows,  5 mg every Sun, Thu; 10 mg all other days .  He was also instructed to come back in 1 weeks for an INR check.    A full discussion of the nature of anticoagulants has been carried out.  A full discussion of the need for frequent and regular monitoring, precise dosage adjustment and compliance was stressed.  Side effects of potential bleeding were discussed and Mr. Burris was instructed to call [phone] if there are any signs of abnormal bleeding.  Mr. Burris was instructed to avoid any OTC items containing aspirin or ibuprofen and prior to starting any new OTC products to consult with his physician or pharmacist to ensure no drug interactions are present.  Mr. Burris was instructed to avoid any major changes in his general diet and to avoid alcohol consumption.  .      Mr. Burris verbalized his understanding of all instructions and will call the office with any questions, concerns, or signs of abnormal bleeding or blood clot.

## 2025-05-01 ENCOUNTER — ANTI-COAG VISIT (OUTPATIENT)
Age: 80
End: 2025-05-01

## 2025-05-01 DIAGNOSIS — I48.91 AF (ATRIAL FIBRILLATION) (HCC): Primary | ICD-10-CM

## 2025-05-01 LAB — INR BLD: 2

## 2025-05-01 NOTE — PROGRESS NOTES
Mr. Ean Burris is here today for anticoagulation monitoring for the diagnosis of atrial fibrillation.  His INR goal is 2.0-3.0 and his current Coumadin dose is 5 mg every Sun, Thu; 10 mg all other days .     Today's findings include an INR of 2     Considering Mr. Burris's past history, todays findings, and per the coumadin policy/protocol, Mr. Burris was instructed to take Coumadin as follows,  5 mg every Sun, Thu; 10 mg all other days .  He was also instructed to come back in 1 weeks for an INR check.    A full discussion of the nature of anticoagulants has been carried out.  A full discussion of the need for frequent and regular monitoring, precise dosage adjustment and compliance was stressed.  Side effects of potential bleeding were discussed and Mr. Burris was instructed to call [phone] if there are any signs of abnormal bleeding.  Mr. Burris was instructed to avoid any OTC items containing aspirin or ibuprofen and prior to starting any new OTC products to consult with his physician or pharmacist to ensure no drug interactions are present.  Mr. Burris was instructed to avoid any major changes in his general diet and to avoid alcohol consumption.  .      Mr. Burris verbalized his understanding of all instructions and will call the office with any questions, concerns, or signs of abnormal bleeding or blood clot.

## 2025-05-05 DIAGNOSIS — I48.20 CHRONIC ATRIAL FIBRILLATION, UNSPECIFIED (HCC): ICD-10-CM

## 2025-05-05 RX ORDER — DIGOXIN 125 MCG
TABLET ORAL
Qty: 90 TABLET | Refills: 0 | Status: SHIPPED | OUTPATIENT
Start: 2025-05-05

## 2025-05-08 ENCOUNTER — ANTI-COAG VISIT (OUTPATIENT)
Age: 80
End: 2025-05-08

## 2025-05-08 DIAGNOSIS — I48.91 AF (ATRIAL FIBRILLATION) (HCC): Primary | ICD-10-CM

## 2025-05-08 LAB — INR BLD: 2

## 2025-05-08 NOTE — PROGRESS NOTES
Mr. Ean Burris is here today for anticoagulation monitoring for the diagnosis of atrial fibrillation.  His INR goal is 2.0-3.0 and his current Coumadin dose is 5 mg every Sun, Thu; 10 mg all other days .     Today's findings include an INR of 2     Considering Mr. Burris's past history, todays findings, and per the coumadin policy/protocol, Mr. Burris was instructed to take Coumadin as follows,  5 mg every Sun, Thu; 10 mg all other days .  He was also instructed to come back in 1 weeks for an INR check.    A full discussion of the nature of anticoagulants has been carried out.  A full discussion of the need for frequent and regular monitoring, precise dosage adjustment and compliance was stressed.  Side effects of potential bleeding were discussed and Mr. Burris was instructed to call [phone] if there are any signs of abnormal bleeding.  Mr. Burris was instructed to avoid any OTC items containing aspirin or ibuprofen and prior to starting any new OTC products to consult with his physician or pharmacist to ensure no drug interactions are present.  Mr. Burris was instructed to avoid any major changes in his general diet and to avoid alcohol consumption.  .      Mr. Burris verbalized his understanding of all instructions and will call the office with any questions, concerns, or signs of abnormal bleeding or blood clot.

## 2025-05-12 ENCOUNTER — OFFICE VISIT (OUTPATIENT)
Age: 80
End: 2025-05-12
Payer: MEDICARE

## 2025-05-12 VITALS
HEART RATE: 74 BPM | HEIGHT: 71 IN | SYSTOLIC BLOOD PRESSURE: 166 MMHG | BODY MASS INDEX: 21.42 KG/M2 | DIASTOLIC BLOOD PRESSURE: 87 MMHG | WEIGHT: 153 LBS | OXYGEN SATURATION: 99 %

## 2025-05-12 DIAGNOSIS — I35.0 NONRHEUMATIC AORTIC (VALVE) STENOSIS: ICD-10-CM

## 2025-05-12 DIAGNOSIS — I34.0 NONRHEUMATIC MITRAL (VALVE) INSUFFICIENCY: ICD-10-CM

## 2025-05-12 DIAGNOSIS — I48.20 CHRONIC ATRIAL FIBRILLATION, UNSPECIFIED (HCC): Primary | ICD-10-CM

## 2025-05-12 DIAGNOSIS — Z79.01 LONG TERM (CURRENT) USE OF ANTICOAGULANTS: ICD-10-CM

## 2025-05-12 DIAGNOSIS — I11.9 HYPERTENSIVE HEART DISEASE WITHOUT HEART FAILURE: ICD-10-CM

## 2025-05-12 PROCEDURE — 1123F ACP DISCUSS/DSCN MKR DOCD: CPT | Performed by: NURSE PRACTITIONER

## 2025-05-12 PROCEDURE — 1159F MED LIST DOCD IN RCRD: CPT | Performed by: NURSE PRACTITIONER

## 2025-05-12 PROCEDURE — G8427 DOCREV CUR MEDS BY ELIG CLIN: HCPCS | Performed by: NURSE PRACTITIONER

## 2025-05-12 PROCEDURE — G8420 CALC BMI NORM PARAMETERS: HCPCS | Performed by: NURSE PRACTITIONER

## 2025-05-12 PROCEDURE — 1126F AMNT PAIN NOTED NONE PRSNT: CPT | Performed by: NURSE PRACTITIONER

## 2025-05-12 PROCEDURE — 1036F TOBACCO NON-USER: CPT | Performed by: NURSE PRACTITIONER

## 2025-05-12 PROCEDURE — 99214 OFFICE O/P EST MOD 30 MIN: CPT | Performed by: NURSE PRACTITIONER

## 2025-05-12 PROCEDURE — 1160F RVW MEDS BY RX/DR IN RCRD: CPT | Performed by: NURSE PRACTITIONER

## 2025-05-12 ASSESSMENT — PATIENT HEALTH QUESTIONNAIRE - PHQ9
SUM OF ALL RESPONSES TO PHQ QUESTIONS 1-9: 0
2. FEELING DOWN, DEPRESSED OR HOPELESS: NOT AT ALL
SUM OF ALL RESPONSES TO PHQ QUESTIONS 1-9: 0
SUM OF ALL RESPONSES TO PHQ QUESTIONS 1-9: 0
1. LITTLE INTEREST OR PLEASURE IN DOING THINGS: NOT AT ALL
SUM OF ALL RESPONSES TO PHQ QUESTIONS 1-9: 0

## 2025-05-12 NOTE — PROGRESS NOTES
1. Have you been to the ER, urgent care clinic since your last visit?  Hospitalized since your last visit?     no    2. Have you seen or consulted any other health care providers outside of the [facility] since your last visit?  Include any pap smears or colon screening.      Yes pcp   
Nonrheumatic aortic (valve) stenosis            Medications Discontinued During This Encounter   Medication Reason    dutasteride (AVODART) 0.5 MG capsule Therapy completed       No orders of the defined types were placed in this encounter.    Patient on lopressor and digoxin for rate control.  Continue coumadin for anticoagulation- INR today 2.1.  Recent digoxin level wnl.  Has mild to moderate mitral regurgitation with enlarged LA chamber size and mildly dilated aorta. Will repeat echo to assess LV function/ VHD.  Reports well controlled bp at home.  Continue current meds  7/2022  Stable cardiac status continue current medical management.  1/2023  Cardiac status stable.  Rate controlled continue current treatment.  INR monitored.  Continue medical management  7/2023  Cardiac status stable.  Controlled.  Tolerating anticoagulation continue therapy.  On admission with bowel obstruction which has resolved.  Order digoxin level  1/2024  Stable cardiac status continue current medical management.  On anticoagulation monitor  7/2024  Cardiac status is stable. B/P mildly elevated in office today - reports typically controlled.  Will check echo to follow aortic stenosis, and MVR.  Continue current medications f/u post testing.  11/22/2024  Cardiac status is stable.  B/P is controlled.    Echo reviewed and discussed with patient - EF 55-60%,   Recent labs reviewed - LDL 75, to continue statin. Will continue current cardiac medications.   5/12/2025  Persistent atrial fibrillation continue rate control with Toprol-XL and digoxin continue anticoagulation with warfarin INR managed with our office  Hypertension blood pressure mildly elevated in office today reports is typically 120s over 70s 80s at home will continue current medication regimen routine labs with PCP due to be drawn next month   Mild aortic stenosis and AI with mean gradient 17 mm Hg, mild MR, mild Mitral stenosis, mild pulmonary HTN.  By echo October 2024 will

## 2025-05-15 ENCOUNTER — ANTI-COAG VISIT (OUTPATIENT)
Age: 80
End: 2025-05-15

## 2025-05-15 DIAGNOSIS — I48.91 AF (ATRIAL FIBRILLATION) (HCC): Primary | ICD-10-CM

## 2025-05-15 LAB — INR BLD: 2.3

## 2025-05-15 NOTE — PROGRESS NOTES
Mr. Ean Burris is here today for anticoagulation monitoring for the diagnosis of atrial fibrillation.  His INR goal is 2.0-3.0 and his current Coumadin dose is 5 mg every Thursday and Sunday 10 mg AOD.     Today's findings include an INR of 2.3     Considering Mr. Burris's past history, todays findings, and per the coumadin policy/protocol, Mr. Burris was instructed to take Coumadin as follows,  resume current dose of coumadin.  He was also instructed to come back in 1 weeks for an INR check.    A full discussion of the nature of anticoagulants has been carried out.  A full discussion of the need for frequent and regular monitoring, precise dosage adjustment and compliance was stressed.  Side effects of potential bleeding were discussed and Mr. Burris was instructed to call [phone] if there are any signs of abnormal bleeding.  Mr. Burris was instructed to avoid any OTC items containing aspirin or ibuprofen and prior to starting any new OTC products to consult with his physician or pharmacist to ensure no drug interactions are present.  Mr. Burris was instructed to avoid any major changes in his general diet and to avoid alcohol consumption.  .      Mr. Burris verbalized his understanding of all instructions and will call the office with any questions, concerns, or signs of abnormal bleeding or blood clot.

## 2025-05-22 ENCOUNTER — ANTI-COAG VISIT (OUTPATIENT)
Age: 80
End: 2025-05-22

## 2025-05-22 DIAGNOSIS — I48.91 AF (ATRIAL FIBRILLATION) (HCC): Primary | ICD-10-CM

## 2025-05-22 LAB — INR BLD: 2.6

## 2025-05-22 NOTE — PROGRESS NOTES
Mr. Ean Burris is here today for anticoagulation monitoring for the diagnosis of atrial fibrillation.  His INR goal is 2.0-3.0 and his current Coumadin dose is 5 mg every Sun, Thu; 10 mg all other days .     Today's findings include an INR of 2.6     Considering Mr. Burris's past history, todays findings, and per the coumadin policy/protocol, Mr. Burris was instructed to take Coumadin as follows,  5 mg every Sun, Thu; 10 mg all other days .  He was also instructed to come back in 1 weeks for an INR check.    A full discussion of the nature of anticoagulants has been carried out.  A full discussion of the need for frequent and regular monitoring, precise dosage adjustment and compliance was stressed.  Side effects of potential bleeding were discussed and Mr. Burris was instructed to call [phone] if there are any signs of abnormal bleeding.  Mr. Burris was instructed to avoid any OTC items containing aspirin or ibuprofen and prior to starting any new OTC products to consult with his physician or pharmacist to ensure no drug interactions are present.  Mr. Burris was instructed to avoid any major changes in his general diet and to avoid alcohol consumption.  .      Mr. Burris verbalized his understanding of all instructions and will call the office with any questions, concerns, or signs of abnormal bleeding or blood clot.

## 2025-05-29 ENCOUNTER — ANTI-COAG VISIT (OUTPATIENT)
Age: 80
End: 2025-05-29

## 2025-05-29 DIAGNOSIS — I48.91 AF (ATRIAL FIBRILLATION) (HCC): Primary | ICD-10-CM

## 2025-05-29 LAB — INR BLD: 2.4

## 2025-05-29 NOTE — PROGRESS NOTES
Mr. Ean Burris is here today for anticoagulation monitoring for the diagnosis of atrial fibrillation.  His INR goal is 2.0-3.0 and his current Coumadin dose is 5 mg every Sun, Thu; 10 mg all other days .     Today's findings include an INR of 2.4     Considering Mr. Burris's past history, todays findings, and per the coumadin policy/protocol, Mr. Burris was instructed to take Coumadin as follows,  5 mg every Sun, Thu; 10 mg all other days .  He was also instructed to come back in 1 weeks for an INR check.    A full discussion of the nature of anticoagulants has been carried out.  A full discussion of the need for frequent and regular monitoring, precise dosage adjustment and compliance was stressed.  Side effects of potential bleeding were discussed and Mr. Burris was instructed to call [phone] if there are any signs of abnormal bleeding.  Mr. Burris was instructed to avoid any OTC items containing aspirin or ibuprofen and prior to starting any new OTC products to consult with his physician or pharmacist to ensure no drug interactions are present.  Mr. Burris was instructed to avoid any major changes in his general diet and to avoid alcohol consumption.  .      Mr. Burris verbalized his understanding of all instructions and will call the office with any questions, concerns, or signs of abnormal bleeding or blood clot.

## 2025-06-05 ENCOUNTER — ANTI-COAG VISIT (OUTPATIENT)
Age: 80
End: 2025-06-05

## 2025-06-05 DIAGNOSIS — I48.91 AF (ATRIAL FIBRILLATION) (HCC): Primary | ICD-10-CM

## 2025-06-05 LAB — INR BLD: 2.5

## 2025-06-05 NOTE — PROGRESS NOTES
Mr. Ean Burris is here today for anticoagulation monitoring for the diagnosis of atrial fibrillation.  His INR goal is 2.0-3.0 and his current Coumadin dose is 5 mg every Sun, Thu; 10 mg all other days .     Today's findings include an INR of 2.5     Considering Mr. Burris's past history, todays findings, and per the coumadin policy/protocol, Mr. Burris was instructed to take Coumadin as follows,  5 mg every Sun, Thu; 10 mg all other days .  He was also instructed to come back in 1 weeks for an INR check.    A full discussion of the nature of anticoagulants has been carried out.  A full discussion of the need for frequent and regular monitoring, precise dosage adjustment and compliance was stressed.  Side effects of potential bleeding were discussed and Mr. Burris was instructed to call [phone] if there are any signs of abnormal bleeding.  Mr. Burris was instructed to avoid any OTC items containing aspirin or ibuprofen and prior to starting any new OTC products to consult with his physician or pharmacist to ensure no drug interactions are present.  Mr. Burris was instructed to avoid any major changes in his general diet and to avoid alcohol consumption.  .      Mr. Burris verbalized his understanding of all instructions and will call the office with any questions, concerns, or signs of abnormal bleeding or blood clot.

## 2025-06-12 ENCOUNTER — ANTI-COAG VISIT (OUTPATIENT)
Age: 80
End: 2025-06-12

## 2025-06-12 DIAGNOSIS — I48.91 AF (ATRIAL FIBRILLATION) (HCC): Primary | ICD-10-CM

## 2025-06-12 LAB — INR BLD: 3

## 2025-06-12 NOTE — PROGRESS NOTES
Mr. Ean Burris is here today for anticoagulation monitoring for the diagnosis of atrial fibrillation.  His INR goal is 2.0-3.0 and his current Coumadin dose is 5 mg on Sunday & Thursday and 10 mg all other days.     Today's findings include an INR of 3.0     Considering Mr. Burris's past history, todays findings, and per the coumadin policy/protocol, Mr. Burris was instructed to take Coumadin as follows,  continue taking the 5 mg on Sunday & Thursday and 10 mg all other days.  He was also instructed to come back in 1 weeks for an INR check.    A full discussion of the nature of anticoagulants has been carried out.  A full discussion of the need for frequent and regular monitoring, precise dosage adjustment and compliance was stressed.  Side effects of potential bleeding were discussed and Mr. Burris was instructed to call [phone] if there are any signs of abnormal bleeding.  Mr. Burris was instructed to avoid any OTC items containing aspirin or ibuprofen and prior to starting any new OTC products to consult with his physician or pharmacist to ensure no drug interactions are present.  Mr. Burris was instructed to avoid any major changes in his general diet and to avoid alcohol consumption.  .      Mr. Burris verbalized his understanding of all instructions and will call the office with any questions, concerns, or signs of abnormal bleeding or blood clot.

## 2025-06-19 ENCOUNTER — ANTI-COAG VISIT (OUTPATIENT)
Age: 80
End: 2025-06-19

## 2025-06-19 DIAGNOSIS — I48.91 AF (ATRIAL FIBRILLATION) (HCC): Primary | ICD-10-CM

## 2025-06-19 LAB — INR BLD: 3.3

## 2025-06-19 NOTE — PROGRESS NOTES
Mr. Ean Burris is here today for anticoagulation monitoring for the diagnosis of atrial fibrillation.  His INR goal is 2.0-3.0 and his current Coumadin dose is 5 mg every Sun, Thu; 10 mg all other days .     Today's findings include an INR of 3.3     Considering Mr. Burris's past history, todays findings, and per the coumadin policy/protocol, Mr. Burris was instructed to take Coumadin as follows,  5 mg every Sun, Thu; 10 mg all other days .  He was also instructed to come back in 1 weeks for an INR check.    A full discussion of the nature of anticoagulants has been carried out.  A full discussion of the need for frequent and regular monitoring, precise dosage adjustment and compliance was stressed.  Side effects of potential bleeding were discussed and Mr. Burris was instructed to call [phone] if there are any signs of abnormal bleeding.  Mr. Burris was instructed to avoid any OTC items containing aspirin or ibuprofen and prior to starting any new OTC products to consult with his physician or pharmacist to ensure no drug interactions are present.  Mr. Burris was instructed to avoid any major changes in his general diet and to avoid alcohol consumption.  .      Mr. Burris verbalized his understanding of all instructions and will call the office with any questions, concerns, or signs of abnormal bleeding or blood clot.

## 2025-06-23 RX ORDER — WARFARIN SODIUM 5 MG/1
TABLET ORAL
Qty: 120 TABLET | Refills: 3 | Status: SHIPPED | OUTPATIENT
Start: 2025-06-23

## 2025-06-26 ENCOUNTER — ANTI-COAG VISIT (OUTPATIENT)
Age: 80
End: 2025-06-26

## 2025-06-26 DIAGNOSIS — I48.91 AF (ATRIAL FIBRILLATION) (HCC): Primary | ICD-10-CM

## 2025-06-26 LAB — INR BLD: 2.4

## 2025-06-26 NOTE — PROGRESS NOTES
Mr. Ean Burris is here today for anticoagulation monitoring for the diagnosis of atrial fibrillation.  His INR goal is 2.0-3.0 and his current Coumadin dose is 5 mg on Sun/Thur and 10 mg all other days.     Today's findings include an INR of 2.40     Considering Mr. Burris's past history, todays findings, and per the coumadin policy/protocol, Mr. Burris was instructed to take Coumadin as follows,  continue taking 5 mg on Sun/Thur and 10 mg all other days.  He was also instructed to come back in 1 weeks for an INR check.    A full discussion of the nature of anticoagulants has been carried out.  A full discussion of the need for frequent and regular monitoring, precise dosage adjustment and compliance was stressed.  Side effects of potential bleeding were discussed and Mr. Burris was instructed to call [phone] if there are any signs of abnormal bleeding.  Mr. Burris was instructed to avoid any OTC items containing aspirin or ibuprofen and prior to starting any new OTC products to consult with his physician or pharmacist to ensure no drug interactions are present.  Mr. Burris was instructed to avoid any major changes in his general diet and to avoid alcohol consumption.  .      Mr. Burris verbalized his understanding of all instructions and will call the office with any questions, concerns, or signs of abnormal bleeding or blood clot.

## 2025-07-03 ENCOUNTER — ANTI-COAG VISIT (OUTPATIENT)
Age: 80
End: 2025-07-03

## 2025-07-03 DIAGNOSIS — I48.91 AF (ATRIAL FIBRILLATION) (HCC): Primary | ICD-10-CM

## 2025-07-03 LAB — INR BLD: 2.2

## 2025-07-03 NOTE — PROGRESS NOTES
Mr. aEn Burris is here today for anticoagulation monitoring for the diagnosis of atrial fibrillation.  His INR goal is 2.0-3.0 and his current Coumadin dose is 5 mg every Sun, Thu; 10 mg all other days .     Today's findings include an INR of 2.2     Considering Mr. Burris's past history, todays findings, and per the coumadin policy/protocol, Mr. Burris was instructed to take Coumadin as follows,  5 mg every Sun, Thu; 10 mg all other days .  He was also instructed to come back in 1 weeks for an INR check.    A full discussion of the nature of anticoagulants has been carried out.  A full discussion of the need for frequent and regular monitoring, precise dosage adjustment and compliance was stressed.  Side effects of potential bleeding were discussed and Mr. Burris was instructed to call [phone] if there are any signs of abnormal bleeding.  Mr. Burris was instructed to avoid any OTC items containing aspirin or ibuprofen and prior to starting any new OTC products to consult with his physician or pharmacist to ensure no drug interactions are present.  Mr. Burris was instructed to avoid any major changes in his general diet and to avoid alcohol consumption.  .      Mr. Burris verbalized his understanding of all instructions and will call the office with any questions, concerns, or signs of abnormal bleeding or blood clot.

## 2025-07-10 ENCOUNTER — ANTI-COAG VISIT (OUTPATIENT)
Age: 80
End: 2025-07-10

## 2025-07-10 DIAGNOSIS — I48.91 AF (ATRIAL FIBRILLATION) (HCC): Primary | ICD-10-CM

## 2025-07-10 LAB — INR BLD: 2.3

## 2025-07-10 NOTE — PROGRESS NOTES
Mr. Ean Burris is here today for anticoagulation monitoring for the diagnosis of atrial fibrillation.  His INR goal is 2.0-3.0 and his current Coumadin dose is 5 mg every Sun, Thu; 10 mg all other days .     Today's findings include an INR of 2.3     Considering Mr. Burris's past history, todays findings, and per the coumadin policy/protocol, Mr. Burris was instructed to take Coumadin as follows,  5 mg every Sun, Thu; 10 mg all other days .  He was also instructed to come back in 1 weeks for an INR check.    A full discussion of the nature of anticoagulants has been carried out.  A full discussion of the need for frequent and regular monitoring, precise dosage adjustment and compliance was stressed.  Side effects of potential bleeding were discussed and Mr. Burris was instructed to call [phone] if there are any signs of abnormal bleeding.  Mr. Burris was instructed to avoid any OTC items containing aspirin or ibuprofen and prior to starting any new OTC products to consult with his physician or pharmacist to ensure no drug interactions are present.  Mr. Burris was instructed to avoid any major changes in his general diet and to avoid alcohol consumption.  .      Mr. Burris verbalized his understanding of all instructions and will call the office with any questions, concerns, or signs of abnormal bleeding or blood clot.

## 2025-07-17 ENCOUNTER — ANTI-COAG VISIT (OUTPATIENT)
Age: 80
End: 2025-07-17

## 2025-07-17 DIAGNOSIS — I48.91 AF (ATRIAL FIBRILLATION) (HCC): Primary | ICD-10-CM

## 2025-07-17 LAB — INR BLD: 2.1

## 2025-07-17 NOTE — PROGRESS NOTES
Mr. Ean Burris is here today for anticoagulation monitoring for the diagnosis of atrial fibrillation.  His INR goal is 2.0-3.0 and his current Coumadin dose is 5 mg every Sun, Thu; 10 mg all other days .     Today's findings include an INR of 2.1     Considering Mr. Burris's past history, todays findings, and per the coumadin policy/protocol, Mr. Burris was instructed to take Coumadin as follows,  5 mg every Sun, Thu; 10 mg all other days .  He was also instructed to come back in 1 weeks for an INR check.    A full discussion of the nature of anticoagulants has been carried out.  A full discussion of the need for frequent and regular monitoring, precise dosage adjustment and compliance was stressed.  Side effects of potential bleeding were discussed and Mr. Burris was instructed to call [phone] if there are any signs of abnormal bleeding.  Mr. Burris was instructed to avoid any OTC items containing aspirin or ibuprofen and prior to starting any new OTC products to consult with his physician or pharmacist to ensure no drug interactions are present.  Mr. Burris was instructed to avoid any major changes in his general diet and to avoid alcohol consumption.  .      Mr. Burris verbalized his understanding of all instructions and will call the office with any questions, concerns, or signs of abnormal bleeding or blood clot.

## 2025-07-24 ENCOUNTER — ANTI-COAG VISIT (OUTPATIENT)
Age: 80
End: 2025-07-24

## 2025-07-24 DIAGNOSIS — I48.91 AF (ATRIAL FIBRILLATION) (HCC): Primary | ICD-10-CM

## 2025-07-24 LAB — INR BLD: 2.7

## 2025-07-24 NOTE — PROGRESS NOTES
Mr. Ean Burris is here today for anticoagulation monitoring for the diagnosis of atrial fibrillation.  His INR goal is 2.0-3.0 and his current Coumadin dose is 5 mg Sun/Thur and 10 mg all other days.     Today's findings include an INR of 2.7     Considering Mr. Burris's past history, todays findings, and per the coumadin policy/protocol, Mr. Burris was instructed to take Coumadin as follows,  continue taking 5 mg every Sun/Thur and 10 mg all other days.  He was also instructed to come back in 1 weeks for an INR check.    A full discussion of the nature of anticoagulants has been carried out.  A full discussion of the need for frequent and regular monitoring, precise dosage adjustment and compliance was stressed.  Side effects of potential bleeding were discussed and Mr. Burris was instructed to call [phone] if there are any signs of abnormal bleeding.  Mr. Burris was instructed to avoid any OTC items containing aspirin or ibuprofen and prior to starting any new OTC products to consult with his physician or pharmacist to ensure no drug interactions are present.  Mr. Burris was instructed to avoid any major changes in his general diet and to avoid alcohol consumption.  .      Mr. Burris verbalized his understanding of all instructions and will call the office with any questions, concerns, or signs of abnormal bleeding or blood clot.

## 2025-07-31 ENCOUNTER — ANTI-COAG VISIT (OUTPATIENT)
Age: 80
End: 2025-07-31

## 2025-07-31 DIAGNOSIS — I48.91 AF (ATRIAL FIBRILLATION) (HCC): Primary | ICD-10-CM

## 2025-07-31 LAB — INR BLD: 2.4

## 2025-08-01 NOTE — PROGRESS NOTES
Mr. Ean Burris is here today for anticoagulation monitoring for the diagnosis of atrial fibrillation.  His INR goal is 2.0-3.0 and his current Coumadin dose is 5 mg every Sun, Thu; 10 mg all other days .     Today's findings include an INR of 2.4     Considering Mr. Burris's past history, todays findings, and per the coumadin policy/protocol, Mr. Burris was instructed to take Coumadin as follows,  5 mg every Sun, Thu; 10 mg all other days .  He was also instructed to come back in 1 weeks for an INR check.    A full discussion of the nature of anticoagulants has been carried out.  A full discussion of the need for frequent and regular monitoring, precise dosage adjustment and compliance was stressed.  Side effects of potential bleeding were discussed and Mr. Burris was instructed to call [phone] if there are any signs of abnormal bleeding.  Mr. Burris was instructed to avoid any OTC items containing aspirin or ibuprofen and prior to starting any new OTC products to consult with his physician or pharmacist to ensure no drug interactions are present.  Mr. Burris was instructed to avoid any major changes in his general diet and to avoid alcohol consumption.  .      Mr. Burris verbalized his understanding of all instructions and will call the office with any questions, concerns, or signs of abnormal bleeding or blood clot.

## 2025-08-07 ENCOUNTER — ANTI-COAG VISIT (OUTPATIENT)
Age: 80
End: 2025-08-07

## 2025-08-07 DIAGNOSIS — I48.91 AF (ATRIAL FIBRILLATION) (HCC): Primary | ICD-10-CM

## 2025-08-07 LAB — INR BLD: 3

## 2025-08-14 ENCOUNTER — ANTI-COAG VISIT (OUTPATIENT)
Age: 80
End: 2025-08-14

## 2025-08-14 DIAGNOSIS — I48.91 AF (ATRIAL FIBRILLATION) (HCC): Primary | ICD-10-CM

## 2025-08-14 LAB — INR BLD: 2.5

## 2025-08-21 ENCOUNTER — ANTI-COAG VISIT (OUTPATIENT)
Age: 80
End: 2025-08-21

## 2025-08-21 DIAGNOSIS — I48.91 AF (ATRIAL FIBRILLATION) (HCC): Primary | ICD-10-CM

## 2025-08-21 LAB — INR BLD: 2.3

## 2025-08-28 ENCOUNTER — ANTI-COAG VISIT (OUTPATIENT)
Age: 80
End: 2025-08-28

## 2025-08-28 DIAGNOSIS — I48.91 AF (ATRIAL FIBRILLATION) (HCC): Primary | ICD-10-CM

## 2025-08-28 LAB — INR BLD: 2

## 2025-09-02 RX ORDER — METOPROLOL SUCCINATE 25 MG/1
25 TABLET, EXTENDED RELEASE ORAL 2 TIMES DAILY
Qty: 180 TABLET | Refills: 3 | Status: SHIPPED | OUTPATIENT
Start: 2025-09-02

## 2025-09-04 ENCOUNTER — ANTI-COAG VISIT (OUTPATIENT)
Age: 80
End: 2025-09-04

## 2025-09-04 DIAGNOSIS — I48.91 AF (ATRIAL FIBRILLATION) (HCC): Primary | ICD-10-CM

## 2025-09-04 LAB — INR BLD: 2
